# Patient Record
Sex: MALE | Race: WHITE | Employment: FULL TIME | URBAN - METROPOLITAN AREA
[De-identification: names, ages, dates, MRNs, and addresses within clinical notes are randomized per-mention and may not be internally consistent; named-entity substitution may affect disease eponyms.]

---

## 2017-01-16 ENCOUNTER — OFFICE VISIT (OUTPATIENT)
Dept: FAMILY MEDICINE | Facility: CLINIC | Age: 52
End: 2017-01-16
Payer: COMMERCIAL

## 2017-01-16 VITALS
TEMPERATURE: 98.9 F | BODY MASS INDEX: 25.9 KG/M2 | DIASTOLIC BLOOD PRESSURE: 65 MMHG | WEIGHT: 185 LBS | HEIGHT: 71 IN | HEART RATE: 81 BPM | SYSTOLIC BLOOD PRESSURE: 134 MMHG

## 2017-01-16 DIAGNOSIS — E89.0 POSTOPERATIVE HYPOTHYROIDISM: Primary | ICD-10-CM

## 2017-01-16 DIAGNOSIS — Z85.850 HISTORY OF PAPILLARY ADENOCARCINOMA OF THYROID: ICD-10-CM

## 2017-01-16 PROCEDURE — 99213 OFFICE O/P EST LOW 20 MIN: CPT | Performed by: NURSE PRACTITIONER

## 2017-01-16 NOTE — PROGRESS NOTES
"  SUBJECTIVE:                                                    Marcelo Hawkins is a 51 year old male who presents to clinic today for the following health issues:      Hypothyroidism Follow-up      Since last visit, patient describes the following symptoms: Weight stable, no hair loss, no skin changes, no constipation, no loose stools       Amount of exercise or physical activity: 6-7 days/week for an average of 45-60 minutes    Problems taking medications regularly: No    Medication side effects: none    Diet: regular (no restrictions)    History of papillary thyroid cancer- post thyroidectomy 1989.   Goal TSH is 0.7-1.4.    -------------------------------------    Problem list and histories reviewed & adjusted, as indicated.  Additional history: as documented    Problem list, Medication list, Allergies, and Medical/Social/Surgical histories reviewed in [facility] and updated as appropriate.    ROS:  Constitutional, HEENT, cardiovascular, pulmonary, GI, , musculoskeletal, neuro, skin, endocrine and psych systems are negative, except as otherwise noted.    OBJECTIVE:                                                    /65 mmHg  Pulse 81  Temp(Src) 98.9  F (37.2  C) (Tympanic)  Ht 5' 11\" (1.803 m)  Wt 185 lb (83.915 kg)  BMI 25.81 kg/m2  Body mass index is 25.81 kg/(m^2).  GENERAL: healthy, alert and no distress  NECK: no adenopathy, no asymmetry, masses, or scars and thyroid normal to palpation  RESP: lungs clear to auscultation - no rales, rhonchi or wheezes  CV: regular rate and rhythm, normal S1 S2, no S3 or S4, no murmur, click or rub, no peripheral edema and peripheral pulses strong  MS: no gross musculoskeletal defects noted, no edema    Diagnostic Test Results:  none      ASSESSMENT/PLAN:                                                      1. Postoperative hypothyroidism  GOAL TSH 0.7-1.4  Currently taking Synthroid 150 mcg- no symptoms   - TSH with free T4 reflex; Future- patient prefers to recheck TSH " In next 1-2 months since previous reading 4 months ago was high    2. History of papillary adenocarcinoma of thyroid    - TSH with free T4 reflex; Future    Follow up 8/2017 for yearly refill of medications, lipid panel    MATTHEW Landry [facility]

## 2017-01-16 NOTE — NURSING NOTE
"Initial /65 mmHg  Pulse 81  Temp(Src) 98.9  F (37.2  C) (Tympanic)  Ht 5' 11\" (1.803 m)  Wt 185 lb (83.915 kg)  BMI 25.81 kg/m2 Estimated body mass index is 25.81 kg/(m^2) as calculated from the following:    Height as of this encounter: 5' 11\" (1.803 m).    Weight as of this encounter: 185 lb (83.915 kg). .    Jeanie Nunez    "

## 2017-01-16 NOTE — MR AVS SNAPSHOT
After Visit Summary   1/16/2017    Marcelo Hawkins    MRN: [id number]           Patient Information     Date Of Birth          1965        Visit Information        Provider Department      1/16/2017 8:40 AM Maya Lema APRN [facility]        Today's Diagnoses     Postoperative hypothyroidism    -  1     History of papillary adenocarcinoma of thyroid           Care Instructions    1. Labs in February/ March time to check TSH level            Thank you for choosing [facility].  You may be receiving a survey in the mail from [employer] regarding your visit today.  Please take a few minutes to complete and return the survey to let us know how we are doing.      If you have questions or concerns, please contact us via [employer] or you can contact your care team at [phone].    Our Clinic hours are:  Monday 6:40 am  to 7:00 pm  Tuesday -Friday 6:40 am to 5:00 pm    The [place] outpatient lab hours are:  Monday - Friday 6:10 am to 4:45 pm  Saturdays 7:00 am to 11:00 am  Appointments are required, call [phone]    If you have clinical questions after hours or would like to schedule an appointment,  call the clinic at [phone].        Follow-ups after your visit        Future tests that were ordered for you today     Open Future Orders        Priority Expected Expires Ordered    TSH with free T4 reflex Routine 2/15/2017 1/16/2018 1/16/2017            Who to contact     If you have questions or need follow up information about today's clinic visit or your schedule please contact [facility] directly at [phone].  Normal or non-critical lab and imaging results will be communicated to you by MyChart, letter or phone within 4 business days after the clinic has received the results. If you do not hear from us within 7 days, please contact the clinic through [employer] or phone. If you have a critical or abnormal lab result, we will notify you by phone as soon  "as possible.  Submit refill requests through [employer] or call your pharmacy and they will forward the refill request to us. Please allow 3 business days for your refill to be completed.          Additional Information About Your Visit        [employer] Information     [employer] gives you secure access to your electronic health record. If you see a primary care provider, you can also send messages to your care team and make appointments. If you have questions, please call your primary care clinic.  If you do not have a primary care provider, please call [phone] and they will assist you.        Care EveryWhere ID     This is your Care EveryWhere ID. This could be used by other organizations to access your [place] medical records  [id number]        Your Vitals Were     Pulse Temperature Height BMI (Body Mass Index)          81 98.9  F (37.2  C) (Tympanic) 5' 11\" (1.803 m) 25.81 kg/m2         Blood Pressure from Last 3 Encounters:   01/16/17 134/65   12/27/16 130/76   09/15/16 118/77    Weight from Last 3 Encounters:   01/16/17 185 lb (83.915 kg)   08/30/16 181 lb 6 oz (82.271 kg)   03/17/16 172 lb (78.019 kg)               Primary Care Provider Office Phone # Fax #    Maya MATTHEW Glass [facility] [phone]       [facility] [address] [address]        Thank you!     Thank you for choosing [facility]  for your care. Our goal is always to provide you with excellent care. Hearing back from our patients is one way we can continue to improve our services. Please take a few minutes to complete the written survey that you may receive in the mail after your visit with us. Thank you!             Your Updated Medication List - Protect others around you: Learn how to safely use, store and throw away your medicines at www.disposemymeds.org.          This list is accurate as of: 1/16/17  8:55 AM.  Always use your most recent med list.                   Brand Name Dispense " Instructions for use    calcium 600 + D 600-400 MG-UNIT per tablet   Generic drug:  calcium-vitamin D     100 tablet    Take 1 tablet by mouth 2 times daily.       levothyroxine 150 MCG tablet    SYNTHROID/LEVOTHROID    45 tablet    Synthroid, brand name only, DO NOT SUBSTITUTE.  Take 1 tablet every other day.  Takes 137 mcg on alternate day.       loratadine 10 MG tablet    CLARITIN    90 tablet    Take 1 tablet (10 mg) by mouth daily       MULTIVITAMIN PO          order for DME     2 Device    Superfeet inserts       simvastatin 20 MG tablet    ZOCOR    90 tablet    Take 1 tablet (20 mg) by mouth At Bedtime

## 2017-01-16 NOTE — PATIENT INSTRUCTIONS
1. Labs in February/ March time to check TSH level            Thank you for choosing [facility].  You may be receiving a survey in the mail from [employer] regarding your visit today.  Please take a few minutes to complete and return the survey to let us know how we are doing.      If you have questions or concerns, please contact us via [employer] or you can contact your care team at [phone].    Our Clinic hours are:  Monday 6:40 am  to 7:00 pm  Tuesday -Friday 6:40 am to 5:00 pm    The [place] outpatient lab hours are:  Monday - Friday 6:10 am to 4:45 pm  Saturdays 7:00 am to 11:00 am  Appointments are required, call [phone]    If you have clinical questions after hours or would like to schedule an appointment,  call the clinic at [phone].

## 2017-03-03 DIAGNOSIS — E89.0 POSTOPERATIVE HYPOTHYROIDISM: ICD-10-CM

## 2017-03-03 DIAGNOSIS — Z85.850 HISTORY OF PAPILLARY ADENOCARCINOMA OF THYROID: ICD-10-CM

## 2017-03-03 LAB — TSH SERPL DL<=0.005 MIU/L-ACNC: 2.64 MU/L (ref 0.4–4)

## 2017-03-03 PROCEDURE — 36415 COLL VENOUS BLD VENIPUNCTURE: CPT | Performed by: NURSE PRACTITIONER

## 2017-03-03 PROCEDURE — 84443 ASSAY THYROID STIM HORMONE: CPT | Performed by: NURSE PRACTITIONER

## 2017-03-13 ENCOUNTER — MYC MEDICAL ADVICE (OUTPATIENT)
Dept: FAMILY MEDICINE | Facility: CLINIC | Age: 52
End: 2017-03-13

## 2017-03-13 DIAGNOSIS — E03.9 HYPOTHYROIDISM, UNSPECIFIED TYPE: ICD-10-CM

## 2017-03-13 RX ORDER — LEVOTHYROXINE SODIUM 150 UG/1
TABLET ORAL
Qty: 45 TABLET | Refills: 0 | Status: SHIPPED | OUTPATIENT
Start: 2017-03-13 | End: 2017-03-13

## 2017-03-13 RX ORDER — LEVOTHYROXINE SODIUM 150 UG/1
TABLET ORAL
Qty: 45 TABLET | Refills: 3 | Status: SHIPPED | OUTPATIENT
Start: 2017-03-13 | End: 2017-03-14

## 2017-03-13 NOTE — TELEPHONE ENCOUNTER
Maya Lema,   Please see my chart below, do you want to schedule another TSH? Levothyroxine refilled. Please advise.   Gregory

## 2017-03-14 RX ORDER — LEVOTHYROXINE SODIUM 150 UG/1
TABLET ORAL
Qty: 90 TABLET | Refills: 3 | Status: SHIPPED | OUTPATIENT
Start: 2017-03-14 | End: 2017-03-20

## 2017-03-14 NOTE — TELEPHONE ENCOUNTER
Advised him via [employer].   I did ask a question though, as I don't see the 137 on his chart any longer, and the 150 was still sent as every other day.     Will wait for his response. He may need 137 mcg tablets also.   Myesha Tinoco RNC

## 2017-03-14 NOTE — TELEPHONE ENCOUNTER
Yes-ok to do 150 mcg daily- please update chart to reflect this and get rid of old synthroid 137 mcg prescription.

## 2017-03-14 NOTE — TELEPHONE ENCOUNTER
Carmen- please see his mychart note, he is on synthroid, brand name only, 150 mcg DAILY now.   The last Rx (sent in per Dr Nichols) still said every other day.   Ok to send in the synthroid 150 daily for a year?   Lab Results   Component Value Date    TSH 2.64 03/03/2017    TSH 2.34 12/23/2016       Myesha Tinoco RNC

## 2017-03-20 DIAGNOSIS — E03.9 HYPOTHYROIDISM, UNSPECIFIED TYPE: ICD-10-CM

## 2017-03-20 RX ORDER — LEVOTHYROXINE SODIUM 150 UG/1
TABLET ORAL
Qty: 90 TABLET | Refills: 3 | Status: SHIPPED | OUTPATIENT
Start: 2017-03-20 | End: 2017-05-09 | Stop reason: DRUGHIGH

## 2017-03-20 NOTE — TELEPHONE ENCOUNTER
Pharmacy calling to clarify sig on prescription sent on 3/14/17.  Note copied below from 3/13/17 [employer] message.    Maya Lema, MATTHEW CNP        10:58 AM   Note      Yes-ok to do 150 mcg daily- please update chart to reflect this and get rid of old synthroid 137 mcg prescription.            New prescription sent to pharmacy.    Deana Mccormack, RN

## 2017-05-08 ENCOUNTER — OFFICE VISIT (OUTPATIENT)
Dept: FAMILY MEDICINE | Facility: CLINIC | Age: 52
End: 2017-05-08
Payer: COMMERCIAL

## 2017-05-08 VITALS
DIASTOLIC BLOOD PRESSURE: 75 MMHG | TEMPERATURE: 97 F | RESPIRATION RATE: 16 BRPM | HEART RATE: 45 BPM | BODY MASS INDEX: 24.69 KG/M2 | WEIGHT: 182.25 LBS | HEIGHT: 72 IN | SYSTOLIC BLOOD PRESSURE: 125 MMHG

## 2017-05-08 DIAGNOSIS — E03.9 ACQUIRED HYPOTHYROIDISM: ICD-10-CM

## 2017-05-08 DIAGNOSIS — C73 PAPILLARY CARCINOMA OF THYROID (H): ICD-10-CM

## 2017-05-08 DIAGNOSIS — K21.9 GASTROESOPHAGEAL REFLUX DISEASE WITHOUT ESOPHAGITIS: ICD-10-CM

## 2017-05-08 DIAGNOSIS — Z00.8 ENCOUNTER FOR BIOMETRIC SCREENING: ICD-10-CM

## 2017-05-08 DIAGNOSIS — Z00.00 ROUTINE GENERAL MEDICAL EXAMINATION AT A HEALTH CARE FACILITY: ICD-10-CM

## 2017-05-08 DIAGNOSIS — Z13.6 CARDIOVASCULAR SCREENING; LDL GOAL LESS THAN 130: Primary | ICD-10-CM

## 2017-05-08 DIAGNOSIS — Z13.1 ENCOUNTER FOR SCREENING EXAMINATION FOR IMPAIRED GLUCOSE REGULATION AND DIABETES MELLITUS: ICD-10-CM

## 2017-05-08 LAB — TSH SERPL DL<=0.005 MIU/L-ACNC: 3.23 MU/L (ref 0.4–4)

## 2017-05-08 PROCEDURE — 36415 COLL VENOUS BLD VENIPUNCTURE: CPT | Performed by: NURSE PRACTITIONER

## 2017-05-08 PROCEDURE — 99213 OFFICE O/P EST LOW 20 MIN: CPT | Mod: 25 | Performed by: NURSE PRACTITIONER

## 2017-05-08 PROCEDURE — 84443 ASSAY THYROID STIM HORMONE: CPT | Performed by: NURSE PRACTITIONER

## 2017-05-08 PROCEDURE — 99396 PREV VISIT EST AGE 40-64: CPT | Performed by: NURSE PRACTITIONER

## 2017-05-08 NOTE — MR AVS SNAPSHOT
After Visit Summary   5/8/2017    Marcelo Hawkins    MRN: [id number]           Patient Information     Date Of Birth          1965        Visit Information        Provider Department      5/8/2017 6:40 AM Maya Lema APRN [facility]        Today's Diagnoses     CARDIOVASCULAR SCREENING; LDL GOAL LESS THAN 130    -  1    Encounter for screening examination for impaired glucose regulation and diabetes mellitus        Papillary carcinoma of thyroid (H)        Acquired hypothyroidism        Gastroesophageal reflux disease without esophagitis          Care Instructions    1. Labs today  2. Schedule upper GI to look at your stomach      Preventive Health Recommendations  Male Ages 50 - 64    Yearly exam:             See your health care provider every year in order to  o   Review health changes.   o   Discuss preventive care.    o   Review your medicines if your doctor has prescribed any.     Have a cholesterol test every 5 years, or more frequently if you are at risk for high cholesterol/heart disease.     Have a diabetes test (fasting glucose) every three years. If you are at risk for diabetes, you should have this test more often.     Have a colonoscopy at age 50, or have a yearly FIT test (stool test). These exams will check for colon cancer.      Talk with your health care provider about whether or not a prostate cancer screening test (PSA) is right for you.    You should be tested each year for STDs (sexually transmitted diseases), if you re at risk.     Shots: Get a flu shot each year. Get a tetanus shot every 10 years.     Nutrition:    Eat at least 5 servings of fruits and vegetables daily.     Eat whole-grain bread, whole-wheat pasta and brown rice instead of white grains and rice.     Talk to your provider about Calcium and Vitamin D.     Lifestyle    Exercise for at least 150 minutes a week (30 minutes a day, 5 days a week). This will help you control your weight and  prevent disease.     Limit alcohol to one drink per day.     No smoking.     Wear sunscreen to prevent skin cancer.     See your dentist every six months for an exam and cleaning.     See your eye doctor every 1 to 2 years.    Lifestyle Changes for Controlling GERD  When you have GERD, stomach acid feels as if it s backing up toward your mouth. Whether or not you take medication to control your GERD, your symptoms can often be improved with lifestyle changes. Talk to your doctor about the following suggestions, which may help you get relief from your symptoms.  Raise Your Head    Reflux is more likely to strike when you re lying down flat, because stomach fluid can flow backward more easily. Raising the head of your bed 4 to 6 inches can help. To do this:    Slide blocks or books under the legs at the head of your bed. Or, place a wedge under the mattress. Many [employer] can make a suitable wedge for you. The wedge should run from your waist to the top of your head.    Don t just prop your head on several pillows. This increases pressure on your stomach. It can make GERD worse.  Watch Your Eating Habits  Certain foods may increase the acid in your stomach or relax the lower esophageal sphincter, making GERD more likely. It s best to avoid the following:    Coffee, tea, and carbonated drinks (with and without caffeine)    Fatty, fried, or spicy food    Mint, chocolate, onions, and tomatoes    Any other foods that seem to irritate your stomach or cause you pain  Relieve the Pressure    Eat smaller meals, even if you have to eat more often.    Don t lie down right after you eat. Wait a few hours for your stomach to empty.    Avoid tight belts and tight-fitting clothes.    Lose excess weight.  Tobacco and Alcohol  Avoid smoking tobacco and drinking alcohol. They can make GERD symptoms worse.    0871-0630 [employer]. [address] [place] [address], [place] [address]. All rights reserved. This information is not  intended as a substitute for professional medical care. Always follow your healthcare professional's instructions.        GERD (Adult)    The esophagus is a tube that carries food from the mouth to the stomach. A valve at the lower end of the esophagus prevents stomach acid from flowing upward. When this valve doesn't work properly, stomach contents may repeatedly flow back up (reflux) into the esophagus. This is called gastroesophageal reflux disease (GERD). GERD can irritate the esophagus. It can cause problems with swallowing or breathing. In severe cases, GERD can cause recurrent pneumonia or other serious problems.  Symptoms of reflux include burning, pressure or sharp pain in the upper abdomen or mid to lower chest. The pain can spread to the neck, back, or shoulder. There may be belching, an acid taste in the back of the throat, chronic cough, or sore throat or hoarseness. GERD symptoms often occur during the day after a big meal. They can also occur at night when lying down.   Home care  Lifestyle changes can help reduce symptoms. If needed, medicines may be prescribed. Symptoms often improve with treatment, but if treatment is stopped, the symptoms often return after a few months. So most persons with GERD will need to continue treatment.  Lifestyle changes    Limit or avoid fatty, fried, and spicy foods, as well as coffee, chocolate, mint, and foods with high acid content such as tomatoes and citrus fruit and juices (orange, grapefruit, lemon).    Don t eat large meals, especially at night. Frequent, smaller meals are best. Do not lie down right after eating. And don t eat anything 3 hours before going to bed.    Avoid drinking alcohol and smoking. As much as possible, stay away from second hand smoke.    If you are overweight, losing weight will reduce symptoms.     Avoid wearing tight clothing around your stomach area.    If your symptoms occur during sleep, use a foam wedge to elevate your upper body  "(not just your head.) Or, place 4\" blocks under the head of your bed.  Medicines  If needed, medicines can help relieve the symptoms of GERD and prevent damage to the esophagus. Discuss a medicine plan with your healthcare provider. This may include one or more of the following medicines:    Antacids to help neutralize the normal acids in your stomach.    Acid blockers (H2 blockers) to decrease acid production.    Acid inhibitors (PPIs) to decrease acid production in a different way than the blockers. They may work better, but can take a little longer to take effect.  Take an antacid 30-60 minutes after eating and at bedtime, but not at the same time as an acid blocker.  Try not to take medicines such as ibuprofen and aspirin. If you are taking aspirin for your heart or other medical reasons, talk to your healthcare provider about stopping it.  Follow-up care  Follow up with your healthcare provider or as advised by our staff.  When to seek medical advice  Call your healthcare provider if any of the following occur:    Stomach pain gets worse or moves to the lower right abdomen (appendix area)    Chest pain appears or gets worse, or spreads to the back, neck, shoulder, or arm    Frequent vomiting (can t keep down liquids)    Blood in the stool or vomit (red or black in color)    Feeling weak or dizzy    Fever of 100.4 F (38 C) or higher, or as directed by your healthcare provider    2764-5165 [employer]. [address] [place], [place] [address]. All rights reserved. This information is not intended as a substitute for professional medical care. Always follow your healthcare professional's instructions.              Follow-ups after your visit        Additional Services     GASTROENTEROLOGY ADULT REF PROCEDURE ONLY       Last Lab Result: Creatinine (mg/dL)       Date                     Value                 05/27/2015               0.89             ----------  Body mass index is 24.72 " kg/(m^2).     Needed:  No  Language:  English    Patient will be contacted to schedule procedure.     Please be aware that coverage of these services is subject to the terms and limitations of your health insurance plan.  Call member services at your health plan with any benefit or coverage questions.  Any procedures must be performed at a [place] facility OR coordinated by your clinic's referral office.    Please bring the following with you to your appointment:    (1) Any X-Rays, CTs or MRIs which have been performed.  Contact the facility where they were done to arrange for  prior to your scheduled appointment.    (2) List of current medications   (3) This referral request   (4) Any documents/labs given to you for this referral                  Who to contact     If you have questions or need follow up information about today's clinic visit or your schedule please contact [facility] directly at [phone].  Normal or non-critical lab and imaging results will be communicated to you by MyChart, letter or phone within 4 business days after the clinic has received the results. If you do not hear from us within 7 days, please contact the clinic through [employer] or phone. If you have a critical or abnormal lab result, we will notify you by phone as soon as possible.  Submit refill requests through [employer] or call your pharmacy and they will forward the refill request to us. Please allow 3 business days for your refill to be completed.          Additional Information About Your Visit        [employer] Information     [employer] gives you secure access to your electronic health record. If you see a primary care provider, you can also send messages to your care team and make appointments. If you have questions, please call your primary care clinic.  If you do not have a primary care provider, please call [phone] and they will assist you.        Care EveryWhere ID     This is your Care  EveryWhere ID. This could be used by other organizations to access your [place] medical records  [id number]        Your Vitals Were     Pulse Temperature Respirations Height BMI (Body Mass Index)       45 97  F (36.1  C) (Tympanic) 16 6' (1.829 m) 24.72 kg/m2        Blood Pressure from Last 3 Encounters:   05/08/17 125/75   01/16/17 134/65   12/27/16 130/76    Weight from Last 3 Encounters:   05/08/17 182 lb 4 oz (82.7 kg)   01/16/17 185 lb (83.9 kg)   08/30/16 181 lb 6 oz (82.3 kg)              We Performed the Following     GASTROENTEROLOGY ADULT REF PROCEDURE ONLY     Glucose     Lipid Profile with reflex to direct LDL     TSH with free T4 reflex        Primary Care Provider Office Phone # Fax #    Maya Aiken Pita MATTHEW ERINN [phone]       [address] [address]        Thank you!     Thank you for choosing [facility]  for your care. Our goal is always to provide you with excellent care. Hearing back from our patients is one way we can continue to improve our services. Please take a few minutes to complete the written survey that you may receive in the mail after your visit with us. Thank you!             Your Updated Medication List - Protect others around you: Learn how to safely use, store and throw away your medicines at www.disposemymeds.org.          This list is accurate as of: 5/8/17  7:19 AM.  Always use your most recent med list.                   Brand Name Dispense Instructions for use    calcium 600 + D 600-400 MG-UNIT per tablet   Generic drug:  calcium-vitamin D     100 tablet    Take 1 tablet by mouth 2 times daily.       levothyroxine 150 MCG tablet    SYNTHROID/LEVOTHROID    90 tablet    Synthroid, brand name only, DO NOT SUBSTITUTE.  Take 1 tablet daily       loratadine 10 MG tablet    CLARITIN    90 tablet    Take 1 tablet (10 mg) by mouth daily       MULTIVITAMIN PO          order for DME     2 Device    Superfeet inserts        simvastatin 20 MG tablet    ZOCOR    90 tablet    Take 1 tablet (20 mg) by mouth At Bedtime

## 2017-05-08 NOTE — PROGRESS NOTES
SUBJECTIVE:     CC: Marcelo Hawkins is an 52 year old male who presents for preventative health visit.     The 10-year ASCVD risk score ([place] ARIEL Jr, et al., 2013) is: 3.4%    Values used to calculate the score:      Age: 52 years      Sex: Male      Is Non- : No      Diabetic: No      Tobacco smoker: No      Systolic Blood Pressure: 125 mmHg      Is BP treated: No      HDL Cholesterol: 55 mg/dL      Total Cholesterol: 186 mg/dL  Patient is eligible for use of low-dose aspirin for primary prevention of heart attack and stroke.  Provider has discussed aspirin with patient and our decision was:     Not Indicated:  Daily low-dose aspirin recommended for primary prevention, patient not eligible.      Healthy Habits:    Do you get at least three servings of calcium containing foods daily (dairy, green leafy vegetables, etc.)? yes    Amount of exercise or daily activities, outside of work: 7 day(s) per week for 25 minutes at gym.    Problems taking medications regularly No    Medication side effects: No    Have you had an eye exam in the past two years? yes    Do you see a dentist twice per year? yes    Do you have sleep apnea, excessive snoring or daytime drowsiness?no        PROBLEMS TO ADD ON...    GERD: has reflux symptoms - tries to watch diet, not eating late at night, no caffeine or chocolate in diet. Avoid alcohol, not smoking.   Avoid eating spicy or acidic foods.   Occasionally takes Zantac.   Feels like he has a hoarse voice at times    History of thyroid papillary cancer- had thyroidectomy  On Levothyroxine. Would like his levels checked today.    Patient has biometric paperwork to be filled out.      Today's PHQ-2 Score:   PHQ-2 ( 1999 [employer]) 8/29/2016 5/21/2015   Q1: Little interest or pleasure in doing things - 2   Q2: Feeling down, depressed or hopeless - 2   PHQ-2 Score - 4   Little interest or pleasure in doing things Not at all -   Feeling down, depressed or hopeless Not at all -    PHQ-2 Score 0 -       Abuse: Current or Past(Physical, Sexual or Emotional)- No  Do you feel safe in your environment - Yes    Social History   Substance Use Topics     Smoking status: Never Smoker     Smokeless tobacco: Never Used     Alcohol use Yes      Comment: 2-3 drinks a week     not really    Last PSA:   PSA   Date Value Ref Range Status   12/17/2013 1.50 0 - 4 ug/L Final       Recent Labs   Lab Test  09/09/16   0608  05/27/15   0623  08/01/14   0702   CHOL  186  148  192   HDL  55  58  58   LDL  106*  70  115   TRIG  125  99  97   CHOLHDLRATIO   --   2.6  3.3   NHDL  131*   --    --        Reviewed orders with patient. Reviewed health maintenance and updated orders accordingly - Yes    Reviewed and updated as needed this visit by clinical staff         Reviewed and updated as needed this visit by Provider            ROS:  C: NEGATIVE for fever, chills, change in weight  I: NEGATIVE for worrisome rashes, moles or lesions  E: NEGATIVE for vision changes or irritation  ENT: NEGATIVE for ear, mouth and throat problems  R: NEGATIVE for significant cough or SOB  CV: NEGATIVE for chest pain, palpitations or peripheral edema  GI: POSITIVE for Hx GERD   male: negative for dysuria, hematuria, decreased urinary stream, erectile dysfunction, urethral discharge  M: NEGATIVE for significant arthralgias or myalgia  N: NEGATIVE for weakness, dizziness or paresthesias  P: NEGATIVE for changes in mood or affect    Problem list, Medication list, Allergies, and Medical/Social/Surgical histories reviewed in EPIC and updated as appropriate.  OBJECTIVE:     There were no vitals taken for this visit.  EXAM:  GENERAL: healthy, alert and no distress  EYES: Eyes grossly normal to inspection, PERRL and conjunctivae and sclerae normal  HENT: ear canals and TM's normal, nose and mouth without ulcers or lesions  NECK: no adenopathy, no asymmetry, masses, or scars and thyroid normal to palpation  RESP: lungs clear to auscultation - no  "rales, rhonchi or wheezes  CV: regular rate and rhythm, normal S1 S2, no S3 or S4, no murmur, click or rub, no peripheral edema and peripheral pulses strong  ABDOMEN: soft, nontender, no hepatosplenomegaly, no masses and bowel sounds normal   (male): normal male genitalia without lesions or urethral discharge, no hernia  RECTAL: normal sphincter tone, no rectal masses, prostate normal size, smooth, nontender without nodules or masses  MS: no gross musculoskeletal defects noted, no edema  SKIN: no suspicious lesions or rashes  NEURO: Normal strength and tone, mentation intact and speech normal  PSYCH: mentation appears normal, affect normal/bright    ASSESSMENT/PLAN:     1. Routine general medical examination at a health care facility      2. Papillary carcinoma of thyroid (H)  Post thyroidectomy     3. Acquired hypothyroidism  - continue current dose of Levothyroxine   - TSH with free T4 reflex    4. Gastroesophageal reflux disease without esophagitis  Discussed starting Omeprazole to see if controls symptoms - however patient declined at this time and would like Endoscopy   - GASTROENTEROLOGY ADULT REF PROCEDURE ONLY  - handout given on GERD precautions.     5. CARDIOVASCULAR SCREENING; LDL GOAL LESS THAN 130    - Lipid Profile (Chol, Trig, HDL, LDL calc); Future  - Glucose; Future    6. Encounter for screening examination for impaired glucose regulation and diabetes mellitus  - Glucose; Future    7. Biometric screen  - will fill out paper work once has lab work done.     COUNSELING:  Reviewed preventive health counseling, as reflected in patient instructions       Regular exercise       Healthy diet/nutrition       Prostate cancer screening         reports that he has never smoked. He has never used smokeless tobacco.    Estimated body mass index is 25.8 kg/(m^2) as calculated from the following:    Height as of 1/16/17: 5' 11\" (1.803 m).    Weight as of 1/16/17: 185 lb (83.9 kg).       Counseling " Resources:  ATP IV Guidelines  Pooled Cohorts Equation Calculator  FRAX Risk Assessment  ICSI Preventive Guidelines  Dietary Guidelines for Americans, 2010  USDA's MyPlate  ASA Prophylaxis  Lung CA Screening    MATTHEW Landry CNP  [facility]

## 2017-05-08 NOTE — PATIENT INSTRUCTIONS
1. Labs today  2. Schedule upper GI to look at your stomach      Preventive Health Recommendations  Male Ages 50 - 64    Yearly exam:             See your health care provider every year in order to  o   Review health changes.   o   Discuss preventive care.    o   Review your medicines if your doctor has prescribed any.     Have a cholesterol test every 5 years, or more frequently if you are at risk for high cholesterol/heart disease.     Have a diabetes test (fasting glucose) every three years. If you are at risk for diabetes, you should have this test more often.     Have a colonoscopy at age 50, or have a yearly FIT test (stool test). These exams will check for colon cancer.      Talk with your health care provider about whether or not a prostate cancer screening test (PSA) is right for you.    You should be tested each year for STDs (sexually transmitted diseases), if you re at risk.     Shots: Get a flu shot each year. Get a tetanus shot every 10 years.     Nutrition:    Eat at least 5 servings of fruits and vegetables daily.     Eat whole-grain bread, whole-wheat pasta and brown rice instead of white grains and rice.     Talk to your provider about Calcium and Vitamin D.     Lifestyle    Exercise for at least 150 minutes a week (30 minutes a day, 5 days a week). This will help you control your weight and prevent disease.     Limit alcohol to one drink per day.     No smoking.     Wear sunscreen to prevent skin cancer.     See your dentist every six months for an exam and cleaning.     See your eye doctor every 1 to 2 years.    Lifestyle Changes for Controlling GERD  When you have GERD, stomach acid feels as if it s backing up toward your mouth. Whether or not you take medication to control your GERD, your symptoms can often be improved with lifestyle changes. Talk to your doctor about the following suggestions, which may help you get relief from your symptoms.  Raise Your Head    Reflux is more likely to  strike when you re lying down flat, because stomach fluid can flow backward more easily. Raising the head of your bed 4 to 6 inches can help. To do this:    Slide blocks or books under the legs at the head of your bed. Or, place a wedge under the mattress. Many foam stores can make a suitable wedge for you. The wedge should run from your waist to the top of your head.    Don t just prop your head on several pillows. This increases pressure on your stomach. It can make GERD worse.  Watch Your Eating Habits  Certain foods may increase the acid in your stomach or relax the lower esophageal sphincter, making GERD more likely. It s best to avoid the following:    Coffee, tea, and carbonated drinks (with and without caffeine)    Fatty, fried, or spicy food    Mint, chocolate, onions, and tomatoes    Any other foods that seem to irritate your stomach or cause you pain  Relieve the Pressure    Eat smaller meals, even if you have to eat more often.    Don t lie down right after you eat. Wait a few hours for your stomach to empty.    Avoid tight belts and tight-fitting clothes.    Lose excess weight.  Tobacco and Alcohol  Avoid smoking tobacco and drinking alcohol. They can make GERD symptoms worse.    6540-6280 [employer]. [address] [place] [address], [place] [address]. All rights reserved. This information is not intended as a substitute for professional medical care. Always follow your healthcare professional's instructions.        GERD (Adult)    The esophagus is a tube that carries food from the mouth to the stomach. A valve at the lower end of the esophagus prevents stomach acid from flowing upward. When this valve doesn't work properly, stomach contents may repeatedly flow back up (reflux) into the esophagus. This is called gastroesophageal reflux disease (GERD). GERD can irritate the esophagus. It can cause problems with swallowing or breathing. In severe cases, GERD can cause recurrent pneumonia or other  "serious problems.  Symptoms of reflux include burning, pressure or sharp pain in the upper abdomen or mid to lower chest. The pain can spread to the neck, back, or shoulder. There may be belching, an acid taste in the back of the throat, chronic cough, or sore throat or hoarseness. GERD symptoms often occur during the day after a big meal. They can also occur at night when lying down.   Home care  Lifestyle changes can help reduce symptoms. If needed, medicines may be prescribed. Symptoms often improve with treatment, but if treatment is stopped, the symptoms often return after a few months. So most persons with GERD will need to continue treatment.  Lifestyle changes    Limit or avoid fatty, fried, and spicy foods, as well as coffee, chocolate, mint, and foods with high acid content such as tomatoes and citrus fruit and juices (orange, grapefruit, lemon).    Don t eat large meals, especially at night. Frequent, smaller meals are best. Do not lie down right after eating. And don t eat anything 3 hours before going to bed.    Avoid drinking alcohol and smoking. As much as possible, stay away from second hand smoke.    If you are overweight, losing weight will reduce symptoms.     Avoid wearing tight clothing around your stomach area.    If your symptoms occur during sleep, use a foam wedge to elevate your upper body (not just your head.) Or, place 4\" blocks under the head of your bed.  Medicines  If needed, medicines can help relieve the symptoms of GERD and prevent damage to the esophagus. Discuss a medicine plan with your healthcare provider. This may include one or more of the following medicines:    Antacids to help neutralize the normal acids in your stomach.    Acid blockers (H2 blockers) to decrease acid production.    Acid inhibitors (PPIs) to decrease acid production in a different way than the blockers. They may work better, but can take a little longer to take effect.  Take an antacid 30-60 minutes after " eating and at bedtime, but not at the same time as an acid blocker.  Try not to take medicines such as ibuprofen and aspirin. If you are taking aspirin for your heart or other medical reasons, talk to your healthcare provider about stopping it.  Follow-up care  Follow up with your healthcare provider or as advised by our staff.  When to seek medical advice  Call your healthcare provider if any of the following occur:    Stomach pain gets worse or moves to the lower right abdomen (appendix area)    Chest pain appears or gets worse, or spreads to the back, neck, shoulder, or arm    Frequent vomiting (can t keep down liquids)    Blood in the stool or vomit (red or black in color)    Feeling weak or dizzy    Fever of 100.4 F (38 C) or higher, or as directed by your healthcare provider    9547-3300 [employer]. [address] [place] [address], [place] [address]. All rights reserved. This information is not intended as a substitute for professional medical care. Always follow your healthcare professional's instructions.

## 2017-05-09 DIAGNOSIS — E89.0 POSTOPERATIVE HYPOTHYROIDISM: Primary | ICD-10-CM

## 2017-05-09 RX ORDER — LEVOTHYROXINE SODIUM 175 UG/1
175 TABLET ORAL DAILY
Qty: 90 TABLET | Refills: 1 | Status: SHIPPED | OUTPATIENT
Start: 2017-05-09 | End: 2017-09-10

## 2017-05-12 DIAGNOSIS — Z13.6 CARDIOVASCULAR SCREENING; LDL GOAL LESS THAN 130: ICD-10-CM

## 2017-05-12 LAB
CHOLEST SERPL-MCNC: 199 MG/DL
GLUCOSE SERPL-MCNC: 87 MG/DL (ref 70–99)
HDLC SERPL-MCNC: 58 MG/DL
LDLC SERPL CALC-MCNC: 113 MG/DL
NONHDLC SERPL-MCNC: 141 MG/DL
TRIGL SERPL-MCNC: 140 MG/DL

## 2017-05-12 PROCEDURE — 82947 ASSAY GLUCOSE BLOOD QUANT: CPT | Performed by: NURSE PRACTITIONER

## 2017-05-12 PROCEDURE — 36415 COLL VENOUS BLD VENIPUNCTURE: CPT | Performed by: NURSE PRACTITIONER

## 2017-05-12 PROCEDURE — 80061 LIPID PANEL: CPT | Performed by: NURSE PRACTITIONER

## 2017-05-15 ENCOUNTER — ANESTHESIA EVENT (OUTPATIENT)
Dept: GASTROENTEROLOGY | Facility: CLINIC | Age: 52
End: 2017-05-15
Payer: COMMERCIAL

## 2017-05-16 NOTE — ANESTHESIA PREPROCEDURE EVALUATION
Anesthesia Evaluation     . Pt has had prior anesthetic.     No history of anesthetic complications          ROS/MED HX    ENT/Pulmonary:  - neg pulmonary ROS     Neurologic:       Cardiovascular:  - neg cardiovascular ROS   (+) Dyslipidemia, ----. : . . . :. .       METS/Exercise Tolerance:  >4 METS   Hematologic:  - neg hematologic  ROS       Musculoskeletal:         GI/Hepatic:  - neg GI/hepatic ROS       Renal/Genitourinary:  - ROS Renal section negative       Endo:     (+) thyroid problem hypothyroidism, .      Psychiatric:     (+) psychiatric history anxiety      Infectious Disease:         Malignancy:   (+) Malignancy History of Other          Other:                     Physical Exam  Normal systems: pulmonary and dental    Airway   Mallampati: I  TM distance: >3 FB  Neck ROM: full    Dental     Cardiovascular   Rhythm and rate: regular and normal      Pulmonary    breath sounds clear to auscultation                    Anesthesia Plan      History & Physical Review  History and physical reviewed and following examination; no interval change.    ASA Status:  2 .    NPO Status:  > 6 hours    Plan for MAC          Postoperative Care      Consents  Anesthetic plan, risks, benefits and alternatives discussed with:  Patient..                          .

## 2017-05-18 ENCOUNTER — HOSPITAL ENCOUNTER (OUTPATIENT)
Facility: CLINIC | Age: 52
Discharge: HOME | End: 2017-05-18
Attending: SURGERY | Admitting: SURGERY
Payer: COMMERCIAL

## 2017-05-18 ENCOUNTER — SURGERY (OUTPATIENT)
Age: 52
End: 2017-05-18

## 2017-05-18 ENCOUNTER — ANESTHESIA (OUTPATIENT)
Dept: GASTROENTEROLOGY | Facility: CLINIC | Age: 52
End: 2017-05-18
Payer: COMMERCIAL

## 2017-05-18 VITALS
OXYGEN SATURATION: 95 % | DIASTOLIC BLOOD PRESSURE: 95 MMHG | BODY MASS INDEX: 24.65 KG/M2 | RESPIRATION RATE: 16 BRPM | SYSTOLIC BLOOD PRESSURE: 120 MMHG | TEMPERATURE: 98 F | WEIGHT: 182 LBS | HEIGHT: 72 IN

## 2017-05-18 LAB — UPPER GI ENDOSCOPY: NORMAL

## 2017-05-18 PROCEDURE — 88312 SPECIAL STAINS GROUP 1: CPT | Mod: 26 | Performed by: SURGERY

## 2017-05-18 PROCEDURE — 43239 EGD BIOPSY SINGLE/MULTIPLE: CPT | Performed by: SURGERY

## 2017-05-18 PROCEDURE — 88312 SPECIAL STAINS GROUP 1: CPT | Performed by: SURGERY

## 2017-05-18 PROCEDURE — 25000132 ZZH RX MED GY IP 250 OP 250 PS 637: Performed by: SURGERY

## 2017-05-18 PROCEDURE — 25000125 ZZHC RX 250: Performed by: NURSE ANESTHETIST, CERTIFIED REGISTERED

## 2017-05-18 PROCEDURE — 25000128 H RX IP 250 OP 636: Performed by: SURGERY

## 2017-05-18 PROCEDURE — 25000125 ZZHC RX 250: Performed by: SURGERY

## 2017-05-18 PROCEDURE — 88305 TISSUE EXAM BY PATHOLOGIST: CPT | Mod: 26 | Performed by: SURGERY

## 2017-05-18 PROCEDURE — 88305 TISSUE EXAM BY PATHOLOGIST: CPT | Performed by: SURGERY

## 2017-05-18 PROCEDURE — 37000008 ZZH ANESTHESIA TECHNICAL FEE, 1ST 30 MIN: Performed by: SURGERY

## 2017-05-18 RX ORDER — ONDANSETRON 2 MG/ML
4 INJECTION INTRAMUSCULAR; INTRAVENOUS
Status: DISCONTINUED | OUTPATIENT
Start: 2017-05-18 | End: 2017-05-18 | Stop reason: HOSPADM

## 2017-05-18 RX ORDER — SODIUM CHLORIDE, SODIUM LACTATE, POTASSIUM CHLORIDE, CALCIUM CHLORIDE 600; 310; 30; 20 MG/100ML; MG/100ML; MG/100ML; MG/100ML
INJECTION, SOLUTION INTRAVENOUS CONTINUOUS
Status: DISCONTINUED | OUTPATIENT
Start: 2017-05-18 | End: 2017-05-18 | Stop reason: HOSPADM

## 2017-05-18 RX ORDER — LIDOCAINE 40 MG/G
CREAM TOPICAL
Status: DISCONTINUED | OUTPATIENT
Start: 2017-05-18 | End: 2017-05-18 | Stop reason: HOSPADM

## 2017-05-18 RX ORDER — PROPOFOL 10 MG/ML
INJECTION, EMULSION INTRAVENOUS PRN
Status: DISCONTINUED | OUTPATIENT
Start: 2017-05-18 | End: 2017-05-18

## 2017-05-18 RX ORDER — SIMETHICONE 40MG/0.6ML
SUSPENSION, DROPS(FINAL DOSAGE FORM)(ML) ORAL PRN
Status: DISCONTINUED | OUTPATIENT
Start: 2017-05-18 | End: 2017-05-18 | Stop reason: HOSPADM

## 2017-05-18 RX ADMIN — SIMETHICONE 40 MG: 20 SUSPENSION/ DROPS ORAL at 11:07

## 2017-05-18 RX ADMIN — LIDOCAINE HYDROCHLORIDE 1 ML: 10 INJECTION, SOLUTION INFILTRATION; PERINEURAL at 10:28

## 2017-05-18 RX ADMIN — BENZOCAINE 2 SPRAY: 220 SPRAY, METERED PERIODONTAL at 11:07

## 2017-05-18 RX ADMIN — SODIUM CHLORIDE, POTASSIUM CHLORIDE, SODIUM LACTATE AND CALCIUM CHLORIDE: 600; 310; 30; 20 INJECTION, SOLUTION INTRAVENOUS at 10:28

## 2017-05-18 RX ADMIN — PROPOFOL 200 MG: 10 INJECTION, EMULSION INTRAVENOUS at 11:09

## 2017-05-18 RX ADMIN — PROPOFOL 50 MG: 10 INJECTION, EMULSION INTRAVENOUS at 11:11

## 2017-05-18 RX ADMIN — PROPOFOL 50 MG: 10 INJECTION, EMULSION INTRAVENOUS at 11:13

## 2017-05-18 NOTE — OP NOTE
EGD - irregular Z line with mild esophagitis (biopsies taken). Small hiatal hernia.  Stomach and duodenum otherwise normal.

## 2017-05-18 NOTE — ANESTHESIA POSTPROCEDURE EVALUATION
Patient: Marcelo Hawkins    Procedure(s):  Gastroscopy   - Wound Class: II-Clean Contaminated    Diagnosis:GERD  Diagnosis Additional Information: No value filed.    Anesthesia Type:  No value filed.    Note:  Anesthesia Post Evaluation    Patient location during evaluation: Bedside  Patient participation: Able to fully participate in evaluation  Level of consciousness: awake and alert  Pain management: adequate  Airway patency: patent  Cardiovascular status: acceptable  Respiratory status: acceptable  Hydration status: acceptable  PONV: none     Anesthetic complications: None          Last vitals:  Vitals:    05/18/17 0959   BP: (!) 142/101   Resp: 18   Temp: 36.7  C (98  F)   SpO2: 98%         Electronically Signed By: MATTHEW Grayson CRNA  May 18, 2017  11:22 AM

## 2017-05-18 NOTE — H&P
52 year old year old male here for upper endoscopy for GERD        Patient Active Problem List   Diagnosis     Papillary carcinoma of thyroid (H)     Hyperlipidemia LDL goal <130     Osteopenia     Internal hemorrhoids     H/O seasonal allergies     Left foot pain     Hypothyroidism     Anxiety       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, residual hypothyroidism     Other and unspecified hyperlipidemia      Postsurgical hypothyroidism     s/p thryoidectomy        Past Surgical History:   Procedure Laterality Date     COLONOSCOPY N/A 3/17/2016    Procedure: COLONOSCOPY;  Surgeon: Jovanny Fuller MD;  Location: [facility]     SURGICAL HISTORY OF -       hernia times three-right inguinal     SURGICAL HISTORY OF -   1989    thyroidectomy       Family History   Problem Relation Age of Onset     Lipids Mother      Hypertension Father      CANCER Father      sweat gland cancer     Other - See Comments Father 77     IPF - ideomatic pulmonary fibrosis     Lipids Brother      Thyroid Disease Brother      Arthritis Maternal Grandfather      HEART DISEASE Paternal Grandmother      Melanoma No family hx of        No current outpatient prescriptions on file.       No Known Allergies    Pt reports that he has never smoked. He has never used smokeless tobacco. He reports that he drinks alcohol. He reports that he does not use illicit drugs.    Exam:    Awake, Alert OX3  Lungs - CTA bilaterally  CV - RRR, no murmurs, distal pulses intact  Abd - soft, non-distended, non-tender, +BS  Extr - No cyanosis or edema    A/P 52 year old year old male in need of upper endoscopy for GERD. Risks, benefits, alternatives, and complications were discussed including the possibility of perforation and the patient agreed to proceed.    Jovanny Fuller MD

## 2017-05-21 ENCOUNTER — MYC MEDICAL ADVICE (OUTPATIENT)
Dept: FAMILY MEDICINE | Facility: CLINIC | Age: 52
End: 2017-05-21

## 2017-05-21 DIAGNOSIS — K21.9 GASTROESOPHAGEAL REFLUX DISEASE WITHOUT ESOPHAGITIS: Primary | ICD-10-CM

## 2017-05-23 NOTE — TELEPHONE ENCOUNTER
Patient was informed of the information below.  Patient wanted to know if you received the information from Dr. Fuller?  Will he prescribe the new med or will Dr. Fuller prescribe the medication?    Thank you  Rashmi PATEL RN

## 2017-05-24 LAB — COPATH REPORT: NORMAL

## 2017-05-31 ENCOUNTER — OFFICE VISIT (OUTPATIENT)
Dept: FAMILY MEDICINE | Facility: CLINIC | Age: 52
End: 2017-05-31
Payer: COMMERCIAL

## 2017-05-31 VITALS
HEART RATE: 72 BPM | WEIGHT: 182.4 LBS | BODY MASS INDEX: 24.74 KG/M2 | SYSTOLIC BLOOD PRESSURE: 115 MMHG | TEMPERATURE: 96.5 F | DIASTOLIC BLOOD PRESSURE: 83 MMHG

## 2017-05-31 DIAGNOSIS — M85.80 OSTEOPENIA: ICD-10-CM

## 2017-05-31 DIAGNOSIS — E03.9 ACQUIRED HYPOTHYROIDISM: ICD-10-CM

## 2017-05-31 DIAGNOSIS — K21.9 GASTROESOPHAGEAL REFLUX DISEASE WITHOUT ESOPHAGITIS: Primary | ICD-10-CM

## 2017-05-31 PROCEDURE — 99214 OFFICE O/P EST MOD 30 MIN: CPT | Performed by: NURSE PRACTITIONER

## 2017-05-31 NOTE — PATIENT INSTRUCTIONS
Thank you for choosing [facility].  You may be receiving a survey in the mail from Pradip Wise regarding your visit today.  Please take a few minutes to complete and return the survey to let us know how we are doing.      If you have questions or concerns, please contact us via [email or web address] or you can contact your care team at [phone].    Our Clinic hours are:  Monday 6:40 am  to 7:00 pm  Tuesday -Friday 6:40 am to 5:00 pm    The [place] outpatient lab hours are:  Monday - Friday 6:10 am to 4:45 pm  Saturdays 7:00 am to 11:00 am  Appointments are required, call [phone]    If you have clinical questions after hours or would like to schedule an appointment,  call the clinic at [phone].

## 2017-05-31 NOTE — MR AVS SNAPSHOT
After Visit Summary   5/31/2017    Marcelo Hawkins    MRN: [id number]           Patient Information     Date Of Birth          1965        Visit Information        Provider Department      5/31/2017 6:40 AM Maya Leam APRN CNP [facility]        Care Instructions          Thank you for choosing [facility].  You may be receiving a survey in the mail from Pradip Wise regarding your visit today.  Please take a few minutes to complete and return the survey to let us know how we are doing.      If you have questions or concerns, please contact us via [employer] or you can contact your care team at [phone].    Our Clinic hours are:  Monday 6:40 am  to 7:00 pm  Tuesday -Friday 6:40 am to 5:00 pm    The [place] outpatient lab hours are:  Monday - Friday 6:10 am to 4:45 pm  Saturdays 7:00 am to 11:00 am  Appointments are required, call [phone]    If you have clinical questions after hours or would like to schedule an appointment,  call the clinic at [phone].          Follow-ups after your visit        Who to contact     If you have questions or need follow up information about today's clinic visit or your schedule please contact [facility] directly at [phone].  Normal or non-critical lab and imaging results will be communicated to you by [facility], letter or phone within 4 business days after the clinic has received the results. If you do not hear from us within 7 days, please contact the clinic through [employer] or phone. If you have a critical or abnormal lab result, we will notify you by phone as soon as possible.  Submit refill requests through [employer] or call your pharmacy and they will forward the refill request to us. Please allow 3 business days for your refill to be completed.          Additional Information About Your Visit        [facility] Information     [facility] gives you secure access to your electronic health record. If you see a primary care  provider, you can also send messages to your care team and make appointments. If you have questions, please call your primary care clinic.  If you do not have a primary care provider, please call [phone] and they will assist you.        Care EveryWhere ID     This is your Care EveryWhere ID. This could be used by other organizations to access your [facility] medical records  [id number]        Your Vitals Were     Pulse Temperature BMI (Body Mass Index)             72 96.5  F (35.8  C) (Tympanic) 24.74 kg/m2          Blood Pressure from Last 3 Encounters:   05/31/17 115/83   05/18/17 (!) 120/95   05/08/17 125/75    Weight from Last 3 Encounters:   05/31/17 182 lb 6.4 oz (82.7 kg)   05/18/17 182 lb (82.6 kg)   05/08/17 182 lb 4 oz (82.7 kg)              Today, you had the following     No orders found for display       Primary Care Provider Office Phone # Fax #    Maya MATTHEW [facility] [phone]       [facility] [address] [facility] [address]        Thank you!     Thank you for choosing [facility]  for your care. Our goal is always to provide you with excellent care. Hearing back from our patients is one way we can continue to improve our services. Please take a few minutes to complete the written survey that you may receive in the mail after your visit with us. Thank you!             Your Updated Medication List - Protect others around you: Learn how to safely use, store and throw away your medicines at www.disposemymeds.org.          This list is accurate as of: 5/31/17  7:17 AM.  Always use your most recent med list.                   Brand Name Dispense Instructions for use    calcium 600 + D 600-400 MG-UNIT per tablet   Generic drug:  calcium-vitamin D     100 tablet    Take 1 tablet by mouth 2 times daily.       levothyroxine 175 MCG tablet    SYNTHROID/LEVOTHROID    90 tablet    Take 1 tablet (175 mcg) by mouth daily       loratadine 10 MG tablet    CLARITIN     90 tablet    Take 1 tablet (10 mg) by mouth daily       MULTIVITAMIN PO          omeprazole 20 MG CR capsule    priLOSEC    30 capsule    Take 1 capsule (20 mg) by mouth daily       order for DME     2 Device    [employer] inserts       simvastatin 20 MG tablet    ZOCOR    90 tablet    Take 1 tablet (20 mg) by mouth At Bedtime

## 2017-05-31 NOTE — PROGRESS NOTES
"  SUBJECTIVE:                                                    Marcelo Hawkins is a 52 year old male who presents to clinic today for the following health issues:    Discuss results of EGD: patient had several questions regarding test and treatment. Started Omeprazole, is trying to avoid foods that increase acid and is sleeping with wedge pillow to \"prop\" himself up at night. Since making these changes he has felt better and slept better at night. Wants to know/discuss timing and interactions with taking Synthroid and Omeprazole.     Medication Followup of omeprazole    Taking Medication as prescribed: yes    Side Effects:  None    Medication Helping Symptoms:  Just took first dose yesterday     GERD: Patient had EGD done 5/18/17:     The nasopharynx and oropharynx were normal.        LA Grade A (one or more mucosal breaks less than 5 mm, not extending        between tops of 2 mucosal folds) esophagitis with no bleeding was found        in the lower third of the esophagus. Biopsies were taken with a cold        forceps for histology.        The Z-line was found at the gastroesophageal junction.        A small hiatal hernia was present.        The exam of the stomach was otherwise normal.        There is no endoscopic evidence of erythema, erythema or inflammatory        changes suggestive of gastritis or ulceration in the stomach.        The examined duodenum was normal.                                                                                     Impression:          - Normal nasopharynx and oropharynx.                        - LA Grade A reflux esophagitis. Biopsied.                        - Z-line, at the gastroesophageal junction.                        - Small hiatal hernia.                        - Normal examined duodenum.   Recommendation:      - Discharge patient to home.                        - Await pathology results.     -------------------------------------    Problem list and histories reviewed & " adjusted, as indicated.  Additional history: as documented    Patient Active Problem List   Diagnosis     Papillary carcinoma of thyroid (H)     Hyperlipidemia LDL goal <130     Osteopenia     Internal hemorrhoids     H/O seasonal allergies     Left foot pain     Hypothyroidism     Anxiety     Past Surgical History:   Procedure Laterality Date     COLONOSCOPY N/A 3/17/2016    Procedure: COLONOSCOPY;  Surgeon: Jovanny Fuller MD;  Location: [facility]     ESOPHAGOSCOPY, GASTROSCOPY, DUODENOSCOPY (EGD), COMBINED N/A 5/18/2017    Procedure: COMBINED ESOPHAGOSCOPY, GASTROSCOPY, DUODENOSCOPY (EGD), BIOPSY SINGLE OR MULTIPLE;  Gastroscopy  ;  Surgeon: Jovanny Fuller MD;  Location: [facility]     SURGICAL HISTORY OF -       hernia times three-right inguinal     SURGICAL HISTORY OF -   1989    thyroidectomy       Social History   Substance Use Topics     Smoking status: Never Smoker     Smokeless tobacco: Never Used     Alcohol use Yes      Comment: 2-3 drinks a week     Family History   Problem Relation Age of Onset     Lipids Mother      Hypertension Father      CANCER Father      sweat gland cancer     Other - See Comments Father 77     IPF - ideomatic pulmonary fibrosis     Lipids Brother      Thyroid Disease Brother      Arthritis Maternal Grandfather      HEART DISEASE Paternal Grandmother      Melanoma No family hx of            Reviewed and updated as needed this visit by clinical staff  Tobacco  Allergies  Med Hx  Surg Hx  Fam Hx  Soc Hx      Reviewed and updated as needed this visit by Provider         ROS:  Constitutional, HEENT, cardiovascular, pulmonary, GI, , musculoskeletal, neuro, skin, endocrine and psych systems are negative, except as otherwise noted.    OBJECTIVE:                                                    /83 (BP Location: Left arm, Patient Position: Chair, Cuff Size: Adult Regular)  Pulse 72  Temp 96.5  F (35.8  C) (Tympanic)  Wt 182 lb 6.4 oz (82.7 kg)  BMI 24.74 kg/m2  Body mass index  is 24.74 kg/(m^2).  GENERAL: healthy, alert and no distress  RESP: unlabored  MS: no gross musculoskeletal defects noted, no edema    Diagnostic Test Results:  none      ASSESSMENT/PLAN:                                                        1. Gastroesophageal reflux disease without esophagitis  Recently had EGD- discussed results with patient   Discussed dietary changes-   Continue to take Omeprazole- discussed timing of medication /interactions with other medications.     2. Osteopenia  Last dexa done in 2013- will repeat later this year- since taking Omeprazole has possibility of causing osteoporosis - discussed with patient     3. Acquired hypothyroidism  Stable- take Levothyroxine 1-2 hrs before taking Omeprazole.           MATTHEW Land[facility]

## 2017-06-06 ENCOUNTER — TELEPHONE (OUTPATIENT)
Dept: FAMILY MEDICINE | Facility: CLINIC | Age: 52
End: 2017-06-06

## 2017-06-06 DIAGNOSIS — E78.5 HYPERLIPIDEMIA LDL GOAL <130: ICD-10-CM

## 2017-06-06 DIAGNOSIS — Z00.00 ROUTINE GENERAL MEDICAL EXAMINATION AT A HEALTH CARE FACILITY: ICD-10-CM

## 2017-06-06 NOTE — TELEPHONE ENCOUNTER
Simvastatin     Last Written Prescription Date: 08/30/2016  Last Fill Quantity: 90, # refills: 3  Last Office Visit with [facility], P or [facility] prescribing provider: 05/31/2017       Lab Results   Component Value Date    CHOL 199 05/12/2017     Lab Results   Component Value Date    HDL 58 05/12/2017     Lab Results   Component Value Date     05/12/2017     Lab Results   Component Value Date    TRIG 140 05/12/2017     Lab Results   Component Value Date    CHOLHDLRATIO 2.6 05/27/2015       Kade HargroveR)

## 2017-06-07 NOTE — TELEPHONE ENCOUNTER
[employer] - lab results for insurance form completed and routed to Maya Lema for review and signature.

## 2017-06-08 RX ORDER — SIMVASTATIN 20 MG
TABLET ORAL
Qty: 90 TABLET | Refills: 3 | Status: SHIPPED | OUTPATIENT
Start: 2017-06-08 | End: 2018-06-28

## 2017-06-08 NOTE — TELEPHONE ENCOUNTER
Prescription approved per [facility] Refill Protocol..  Myesha Tinoco RNC

## 2017-07-17 DIAGNOSIS — K21.9 GASTROESOPHAGEAL REFLUX DISEASE WITHOUT ESOPHAGITIS: ICD-10-CM

## 2017-07-17 NOTE — TELEPHONE ENCOUNTER
Omeprazole    Last Written Prescription Date: 05/24/17  Last Fill Quantity: 30,  # refills: 3   Last Office Visit with G, P or [facility] prescribing provider: 05/31/17

## 2017-07-27 ENCOUNTER — TELEPHONE (OUTPATIENT)
Dept: PODIATRY | Facility: CLINIC | Age: 52
End: 2017-07-27

## 2017-07-27 NOTE — TELEPHONE ENCOUNTER
It has been 2 years since I seen him. I should reevaluate his foot or any changes in the prescription of the orthotic.

## 2017-08-03 ENCOUNTER — OFFICE VISIT (OUTPATIENT)
Dept: PODIATRY | Facility: CLINIC | Age: 52
End: 2017-08-03
Payer: COMMERCIAL

## 2017-08-03 VITALS — HEIGHT: 72 IN | WEIGHT: 192 LBS | BODY MASS INDEX: 26.01 KG/M2

## 2017-08-03 DIAGNOSIS — M77.8 CAPSULITIS OF LEFT FOOT: Primary | ICD-10-CM

## 2017-08-03 PROCEDURE — 99213 OFFICE O/P EST LOW 20 MIN: CPT | Performed by: PODIATRIST

## 2017-08-03 NOTE — MR AVS SNAPSHOT
After Visit Summary   8/3/2017    Marcelo Hawkins    MRN: [id number]           Patient Information     Date Of Birth          1965        Visit Information        Provider Department      8/3/2017 3:40 PM Marcelo Sanford DPM [facility]        Today's Diagnoses     Capsulitis of left foot    -  1      Care Instructions    Initial musculoskeletal treatment recommendation:    1.  Stretch the calf muscles as instructed once an hour.  2.  Ice the injured area in the evening; 20 min on/off.  3.  Take antiinflammatory medication as directed.  4.  Massage the soft tissues around the injured area in the morning to loosen the tissue  5.  Wear supportive foot wear    If no improvement in symptoms within four to six weeks, return to clinic for reevaluation.            Follow-ups after your visit        Additional Services     ORTHOTICS REFERRAL       Please be aware that coverage of these services is subject to the terms and limitations of your health insurance plan.  Call member services at your health plan with any benefit or coverage questions.      Please bring the following to your appointment:    >>   Any x-rays, CTs or MRIs which have been performed.  Contact the facility where they were done to arrange for  prior to your scheduled appointment.  Any new CT, MRI or other procedures ordered by your specialist must be performed at a [facility] facility or coordinated by your clinic's referral office.    >>   List of current medications   >>   This referral request   >>   Any documents/labs given to you for this referral    ==This Referral PRINTS in the [facility] (ORTHOTICS & PROSTHETICS) Central scheduling office ==     The [facility] Central Scheduling staff will contact patient to arrange appointments. Central Scheduling Phone #:  [place], [place]  [phone]     Orthotics: Foot Orthotics with metatarsal pads                  Follow-up notes  from your care team     Return if symptoms worsen or fail to improve.      Who to contact     If you have questions or need follow up information about today's clinic visit or your schedule please contact [facility] directly at [phone].  Normal or non-critical lab and imaging results will be communicated to you by MyChart, letter or phone within 4 business days after the clinic has received the results. If you do not hear from us within 7 days, please contact the clinic through web care [employer] or phone. If you have a critical or abnormal lab result, we will notify you by phone as soon as possible.  Submit refill requests through [facility] or call your pharmacy and they will forward the refill request to us. Please allow 3 business days for your refill to be completed.          Additional Information About Your Visit        web care [employer] Information     [facility] gives you secure access to your electronic health record. If you see a primary care provider, you can also send messages to your care team and make appointments. If you have questions, please call your primary care clinic.  If you do not have a primary care provider, please call [phone] and they will assist you.        Care EveryWhere ID     This is your Care EveryWhere ID. This could be used by other organizations to access your [facility] medical records  [id number]        Your Vitals Were     Height BMI (Body Mass Index)                1.829 m (6') 26.04 kg/m2           Blood Pressure from Last 3 Encounters:   05/31/17 115/83   05/18/17 (!) 120/95   05/08/17 125/75    Weight from Last 3 Encounters:   08/03/17 87.1 kg (192 lb)   05/31/17 82.7 kg (182 lb 6.4 oz)   05/18/17 82.6 kg (182 lb)              We Performed the Following     ORTHOTICS REFERRAL        Primary Care Provider Office Phone # Fax #    MATTHEW Landry [facility] [phone]       [facility] [address] [address]        Equal  Access to Services     [facility]: Hadii aad ku hadvickstephon Nhungmarcell, wabeda luqadaha, qaybta kaalmazackary bowles. So [facility] [phone].    ATENCIÓN: Si mohamudla davide, tiene a bhatti disposición servicios gratuitos de asistencia lingüística. Llame al [phone].    We comply with applicable federal civil rights laws and [place] laws. We do not discriminate on the basis of race, color, national origin, age, disability sex, sexual orientation or gender identity.            Thank you!     Thank you for choosing [facility]  for your care. Our goal is always to provide you with excellent care. Hearing back from our patients is one way we can continue to improve our services. Please take a few minutes to complete the written survey that you may receive in the mail after your visit with us. Thank you!             Your Updated Medication List - Protect others around you: Learn how to safely use, store and throw away your medicines at www.disposemymeds.org.          This list is accurate as of: 8/3/17  3:56 PM.  Always use your most recent med list.                   Brand Name Dispense Instructions for use Diagnosis    calcium 600 + D 600-400 MG-UNIT per tablet   Generic drug:  calcium-vitamin D     100 tablet    Take 1 tablet by mouth 2 times daily.        levothyroxine 175 MCG tablet    SYNTHROID/LEVOTHROID    90 tablet    Take 1 tablet (175 mcg) by mouth daily    Postoperative hypothyroidism       loratadine 10 MG tablet    CLARITIN    90 tablet    Take 1 tablet (10 mg) by mouth daily    H/O seasonal allergies       MULTIVITAMIN PO           omeprazole 20 MG CR capsule    priLOSEC    90 capsule    Take 1 capsule by mouth  daily    Gastroesophageal reflux disease without esophagitis       order for DME     2 Device    Superfeet inserts    Left foot pain, Neuroma, Faust's, left       simvastatin 20 MG tablet    ZOCOR    90 tablet    Take 1 tablet by  mouth at  bedtime    Routine general medical examination at a health care facility, Hyperlipidemia LDL goal <130

## 2017-08-03 NOTE — LETTER
8/3/2017         RE: Mareclo Hawkins  [address] [address] [place] [id number]        Dear Colleague,    Thank you for referring your patient, Marcelo Hawkins, to the [facility]. Please see a copy of my visit note below.    PATIENT HISTORY:  Marcelo Hawkins is a 52 year old male who presents to clinic for a painful left foot .  The patient describes the pain as sharp stabbing.  The patient relates pain is located on the ball of the left foot.  The patient relates the pain has been present for the past several weeks.  The patient relates pain with ambulation.  The patient has tried different shoes with little relief.       REVIEW OF SYSTEMS:  Constitutional, HEENT, cardiovascular, pulmonary, GI, , musculoskeletal, neuro, skin, endocrine and psych systems are negative, except as otherwise noted.     PAST MEDICAL HISTORY:   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, residual hypothyroidism     Other and unspecified hyperlipidemia      Postsurgical hypothyroidism     s/p thryoidectomy         PAST SURGICAL HISTORY:   Past Surgical History:   Procedure Laterality Date     COLONOSCOPY N/A 3/17/2016    Procedure: COLONOSCOPY;  Surgeon: Jovanny Fuller MD;  Location: [facility]     ESOPHAGOSCOPY, GASTROSCOPY, DUODENOSCOPY (EGD), COMBINED N/A 5/18/2017    Procedure: COMBINED ESOPHAGOSCOPY, GASTROSCOPY, DUODENOSCOPY (EGD), BIOPSY SINGLE OR MULTIPLE;  Gastroscopy  ;  Surgeon: Jovanny Fuller MD;  Location: [facility]     SURGICAL HISTORY OF -       hernia times three-right inguinal     SURGICAL HISTORY OF -   1989    thyroidectomy        MEDICATIONS:   Current Outpatient Prescriptions:      omeprazole (PRILOSEC) 20 MG CR capsule, Take 1 capsule by mouth  daily, Disp: 90 capsule, Rfl: 0     simvastatin (ZOCOR) 20 MG tablet, Take 1 tablet by mouth at  bedtime, Disp: 90 tablet, Rfl: 3     levothyroxine (SYNTHROID/LEVOTHROID) 175  MCG tablet, Take 1 tablet (175 mcg) by mouth daily, Disp: 90 tablet, Rfl: 1     loratadine (CLARITIN) 10 MG tablet, Take 1 tablet (10 mg) by mouth daily, Disp: 90 tablet, Rfl: 3     ORDER FOR DME, Superfeet inserts, Disp: 2 Device, Rfl: 0     Multiple Vitamins-Minerals (MULTIVITAMIN OR), , Disp: , Rfl:      calcium-vitamin D (CALCIUM 600 + D) 600-400 MG-UNIT per tablet, Take 1 tablet by mouth 2 times daily., Disp: 100 tablet, Rfl: 3     ALLERGIES:    Allergies   Allergen Reactions     Nka [No Known Allergies]         SOCIAL HISTORY:   Social History     Social History     Marital status:      Spouse name: Merline     Number of children: 2     Years of education: N/A     Occupational History      [employer]     Social History Main Topics     Smoking status: Never Smoker     Smokeless tobacco: Never Used     Alcohol use Yes      Comment: 2-3 drinks a week     Drug use: No     Sexual activity: Yes     Partners: Female     Birth control/ protection: Surgical     Other Topics Concern     Caffeine Concern Yes     1 coffee daily     Exercise Yes     walks & Running-3 x days a week     Seat Belt Yes     Self-Exams Yes     Parent/Sibling W/ Cabg, Mi Or Angioplasty Before 65f 55m? No     Social History Narrative        FAMILY HISTORY:   Family History   Problem Relation Age of Onset     Lipids Mother      Hypertension Father      CANCER Father      sweat gland cancer     Other - See Comments Father 77     IPF - ideomatic pulmonary fibrosis     Lipids Brother      Thyroid Disease Brother      Arthritis Maternal Grandfather      HEART DISEASE Paternal Grandmother      Melanoma No family hx of         EXAM:Vitals: Ht 1.829 m (6')  Wt 87.1 kg (192 lb)  BMI 26.04 kg/m2  BMI= Body mass index is 26.04 kg/(m^2).    Weight management plan: Patient was referred to their PCP to discuss a diet and exercise plan.    General appearance: Patient is alert and fully cooperative with history & exam.  No sign of distress is noted  during the visit.     Psychiatric: Affect is pleasant & appropriate.  Patient appears motivated to improve health.     Respiratory: Breathing is regular & unlabored while sitting.     HEENT: Hearing is intact to spoken word.  Speech is clear.  No gross evidence of visual impairment that would impact ambulation.     Dermatologic: Skin is intact to both lower extremities without significant lesions, rash or abrasion.  No paronychia or evidence of soft tissue infection is noted.     Vascular: DP & PT pulses are intact & regular bilaterally.  No significant edema or varicosities noted.  CFT and skin temperature is normal to both lower extremities.     Neurologic: Lower extremity sensation is intact to light touch.  No evidence of weakness or contracture in the lower extremities.  No evidence of neuropathy.     Musculoskeletal: Patient is ambulatory without assistive device or brace.  No gross ankle deformity noted.  No foot or ankle joint effusion is noted.  Noted pain on the plantar aspect of the second metatarsophalangeal joint on the left foot. Noted positive Lachman's test and second metatarsophalangeal joint on the left foot. No surrounding erythema noted.       ASSESSMENT / PLAN:     ICD-10-CM    1. Capsulitis of left foot M77.52 ORTHOTICS REFERRAL       I have explained to Marcelo  about the conditions.  We discussed the nature of the condition as well as the treatment plan and expected length of recovery.  At this time, the patient was instructed on icing, stretching, tissue massage and support.  The patient was referred to [facility] for custom orthotics that will aid in offloading the tension forces to the soft tissues and prevent further inflammation.   The patient will return in four weeks for reevaluation if the symptoms do not resolve.        Disclaimer: This note consists of symbols derived from keyboarding, dictation and/or voice recognition software. As a result, there may be errors  in the script that have gone undetected. Please consider this when interpreting information found in this chart.       SYEDA Sanford D.P.M., F.A.C.F.A.S.        Again, thank you for allowing me to participate in the care of your patient.        Sincerely,        Marcelo Sanford DPM

## 2017-08-03 NOTE — PROGRESS NOTES
PATIENT HISTORY:  Marcelo Hawkins is a 52 year old male who presents to clinic for a painful left foot .  The patient describes the pain as sharp stabbing.  The patient relates pain is located on the ball of the left foot.  The patient relates the pain has been present for the past several weeks.  The patient relates pain with ambulation.  The patient has tried different shoes with little relief.       REVIEW OF SYSTEMS:  Constitutional, HEENT, cardiovascular, pulmonary, GI, , musculoskeletal, neuro, skin, endocrine and psych systems are negative, except as otherwise noted.     PAST MEDICAL HISTORY:   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, residual hypothyroidism     Other and unspecified hyperlipidemia      Postsurgical hypothyroidism     s/p thryoidectomy         PAST SURGICAL HISTORY:   Past Surgical History:   Procedure Laterality Date     COLONOSCOPY N/A 3/17/2016    Procedure: COLONOSCOPY;  Surgeon: Jovanny Fuller MD;  Location: [facility]     ESOPHAGOSCOPY, GASTROSCOPY, DUODENOSCOPY (EGD), COMBINED N/A 5/18/2017    Procedure: COMBINED ESOPHAGOSCOPY, GASTROSCOPY, DUODENOSCOPY (EGD), BIOPSY SINGLE OR MULTIPLE;  Gastroscopy  ;  Surgeon: Jovanny Fuller MD;  Location: [facility]     SURGICAL HISTORY OF -       hernia times three-right inguinal     SURGICAL HISTORY OF -   1989    thyroidectomy        MEDICATIONS:   Current Outpatient Prescriptions:      omeprazole (PRILOSEC) 20 MG CR capsule, Take 1 capsule by mouth  daily, Disp: 90 capsule, Rfl: 0     simvastatin (ZOCOR) 20 MG tablet, Take 1 tablet by mouth at  bedtime, Disp: 90 tablet, Rfl: 3     levothyroxine (SYNTHROID/LEVOTHROID) 175 MCG tablet, Take 1 tablet (175 mcg) by mouth daily, Disp: 90 tablet, Rfl: 1     loratadine (CLARITIN) 10 MG tablet, Take 1 tablet (10 mg) by mouth daily, Disp: 90 tablet, Rfl: 3     ORDER FOR DME, Superfeet inserts, Disp: 2 Device, Rfl: 0     Multiple  Vitamins-Minerals (MULTIVITAMIN OR), , Disp: , Rfl:      calcium-vitamin D (CALCIUM 600 + D) 600-400 MG-UNIT per tablet, Take 1 tablet by mouth 2 times daily., Disp: 100 tablet, Rfl: 3     ALLERGIES:    Allergies   Allergen Reactions     Nka [No Known Allergies]         SOCIAL HISTORY:   Social History     Social History     Marital status:      Spouse name: Merline     Number of children: 2     Years of education: N/A     Occupational History      [employer]     Social History Main Topics     Smoking status: Never Smoker     Smokeless tobacco: Never Used     Alcohol use Yes      Comment: 2-3 drinks a week     Drug use: No     Sexual activity: Yes     Partners: Female     Birth control/ protection: Surgical     Other Topics Concern     Caffeine Concern Yes     1 coffee daily     Exercise Yes     walks & Running-3 x days a week     Seat Belt Yes     Self-Exams Yes     Parent/Sibling W/ Cabg, Mi Or Angioplasty Before 65f 55m? No     Social History Narrative        FAMILY HISTORY:   Family History   Problem Relation Age of Onset     Lipids Mother      Hypertension Father      CANCER Father      sweat gland cancer     Other - See Comments Father 77     IPF - ideomatic pulmonary fibrosis     Lipids Brother      Thyroid Disease Brother      Arthritis Maternal Grandfather      HEART DISEASE Paternal Grandmother      Melanoma No family hx of         EXAM:Vitals: Ht 1.829 m (6')  Wt 87.1 kg (192 lb)  BMI 26.04 kg/m2  BMI= Body mass index is 26.04 kg/(m^2).    Weight management plan: Patient was referred to their PCP to discuss a diet and exercise plan.    General appearance: Patient is alert and fully cooperative with history & exam.  No sign of distress is noted during the visit.     Psychiatric: Affect is pleasant & appropriate.  Patient appears motivated to improve health.     Respiratory: Breathing is regular & unlabored while sitting.     HEENT: Hearing is intact to spoken word.  Speech is clear.  No gross  evidence of visual impairment that would impact ambulation.     Dermatologic: Skin is intact to both lower extremities without significant lesions, rash or abrasion.  No paronychia or evidence of soft tissue infection is noted.     Vascular: DP & PT pulses are intact & regular bilaterally.  No significant edema or varicosities noted.  CFT and skin temperature is normal to both lower extremities.     Neurologic: Lower extremity sensation is intact to light touch.  No evidence of weakness or contracture in the lower extremities.  No evidence of neuropathy.     Musculoskeletal: Patient is ambulatory without assistive device or brace.  No gross ankle deformity noted.  No foot or ankle joint effusion is noted.  Noted pain on the plantar aspect of the second metatarsophalangeal joint on the left foot. Noted positive Lachman's test and second metatarsophalangeal joint on the left foot. No surrounding erythema noted.       ASSESSMENT / PLAN:     ICD-10-CM    1. Capsulitis of left foot M77.52 ORTHOTICS REFERRAL       I have explained to Marcelo  about the conditions.  We discussed the nature of the condition as well as the treatment plan and expected length of recovery.  At this time, the patient was instructed on icing, stretching, tissue massage and support.  The patient was referred to [facility] for custom orthotics that will aid in offloading the tension forces to the soft tissues and prevent further inflammation.   The patient will return in four weeks for reevaluation if the symptoms do not resolve.        Disclaimer: This note consists of symbols derived from keyboarding, dictation and/or voice recognition software. As a result, there may be errors in the script that have gone undetected. Please consider this when interpreting information found in this chart.       SYEDA Sanford D.P.M., WESTLEY.F.A.S.

## 2017-09-08 DIAGNOSIS — E89.0 POSTOPERATIVE HYPOTHYROIDISM: ICD-10-CM

## 2017-09-08 LAB — TSH SERPL DL<=0.005 MIU/L-ACNC: 1.88 MU/L (ref 0.4–4)

## 2017-09-08 PROCEDURE — 84443 ASSAY THYROID STIM HORMONE: CPT | Performed by: NURSE PRACTITIONER

## 2017-09-08 PROCEDURE — 36415 COLL VENOUS BLD VENIPUNCTURE: CPT | Performed by: NURSE PRACTITIONER

## 2017-09-10 DIAGNOSIS — E89.0 POSTOPERATIVE HYPOTHYROIDISM: ICD-10-CM

## 2017-09-11 ENCOUNTER — MYC MEDICAL ADVICE (OUTPATIENT)
Dept: FAMILY MEDICINE | Facility: CLINIC | Age: 52
End: 2017-09-11

## 2017-09-11 DIAGNOSIS — E89.0 POSTOPERATIVE HYPOTHYROIDISM: Primary | ICD-10-CM

## 2017-09-11 NOTE — TELEPHONE ENCOUNTER
Synthroid    Last Written Prescription Date: 05/09/17  Last Quantity: 90, # refills: 1  Last Office Visit with [facility], P or [facility] prescribing provider: 05/31/17        TSH   Date Value Ref Range Status   09/08/2017 1.88 0.40 - 4.00 mU/L Final

## 2017-09-15 RX ORDER — LEVOTHYROXINE SODIUM 175 MCG
TABLET ORAL
Qty: 90 TABLET | Refills: 1 | Status: SHIPPED | OUTPATIENT
Start: 2017-09-15 | End: 2017-12-26

## 2017-09-15 NOTE — TELEPHONE ENCOUNTER
Prescription approved per [facility] Refill Protocol.  Per last Mychart message patient is rechecking his tsh in 6 months.    Deana Mccormack RN

## 2017-09-24 DIAGNOSIS — K21.9 GASTROESOPHAGEAL REFLUX DISEASE WITHOUT ESOPHAGITIS: ICD-10-CM

## 2017-09-24 NOTE — TELEPHONE ENCOUNTER
Omeprazole      Last Written Prescription Date: 08/01/17  Last Fill Quantity: 90,  # refills: 0   Last Office Visit with [facility], P or [facility] prescribing provider: 05/31/17

## 2017-09-26 NOTE — TELEPHONE ENCOUNTER
Prescription approved per [facility] Refill Protocol.    Jocelyn ONEILL Rn

## 2017-10-03 DIAGNOSIS — M85.80 OSTEOPENIA, UNSPECIFIED LOCATION: Primary | ICD-10-CM

## 2017-10-26 ENCOUNTER — HOSPITAL ENCOUNTER (OUTPATIENT)
Dept: BONE DENSITY | Facility: CLINIC | Age: 52
Discharge: HOME | End: 2017-10-26
Attending: NURSE PRACTITIONER | Admitting: NURSE PRACTITIONER
Payer: COMMERCIAL

## 2017-10-26 DIAGNOSIS — M85.80 OSTEOPENIA, UNSPECIFIED LOCATION: ICD-10-CM

## 2017-10-26 PROCEDURE — 77080 DXA BONE DENSITY AXIAL: CPT

## 2017-10-30 ENCOUNTER — MYC MEDICAL ADVICE (OUTPATIENT)
Dept: FAMILY MEDICINE | Facility: CLINIC | Age: 52
End: 2017-10-30

## 2017-11-02 ENCOUNTER — OFFICE VISIT (OUTPATIENT)
Dept: FAMILY MEDICINE | Facility: CLINIC | Age: 52
End: 2017-11-02
Payer: COMMERCIAL

## 2017-11-02 VITALS
BODY MASS INDEX: 23.84 KG/M2 | TEMPERATURE: 97.2 F | OXYGEN SATURATION: 98 % | WEIGHT: 176 LBS | SYSTOLIC BLOOD PRESSURE: 135 MMHG | DIASTOLIC BLOOD PRESSURE: 83 MMHG | HEIGHT: 72 IN | HEART RATE: 51 BPM

## 2017-11-02 DIAGNOSIS — K21.9 GASTROESOPHAGEAL REFLUX DISEASE WITHOUT ESOPHAGITIS: ICD-10-CM

## 2017-11-02 DIAGNOSIS — M85.80 OSTEOPENIA, UNSPECIFIED LOCATION: Primary | ICD-10-CM

## 2017-11-02 DIAGNOSIS — E89.0 POSTSURGICAL HYPOTHYROIDISM: ICD-10-CM

## 2017-11-02 DIAGNOSIS — I83.93 VARICOSE VEINS OF BOTH LOWER EXTREMITIES: ICD-10-CM

## 2017-11-02 DIAGNOSIS — N52.8 OTHER MALE ERECTILE DYSFUNCTION: ICD-10-CM

## 2017-11-02 DIAGNOSIS — C73 PAPILLARY CARCINOMA OF THYROID (H): ICD-10-CM

## 2017-11-02 PROCEDURE — 99215 OFFICE O/P EST HI 40 MIN: CPT | Performed by: NURSE PRACTITIONER

## 2017-11-02 RX ORDER — SILDENAFIL 50 MG/1
50 TABLET, FILM COATED ORAL DAILY PRN
Qty: 5 TABLET | Refills: 3 | Status: SHIPPED | OUTPATIENT
Start: 2017-11-02 | End: 2017-11-02

## 2017-11-02 RX ORDER — SILDENAFIL 50 MG/1
50 TABLET, FILM COATED ORAL DAILY PRN
Qty: 5 TABLET | Refills: 3 | Status: SHIPPED | OUTPATIENT
Start: 2017-11-02 | End: 2017-12-11

## 2017-11-02 NOTE — MR AVS SNAPSHOT
After Visit Summary   11/2/2017    Marcelo Hawkins    MRN: [id number]           Patient Information     Date Of Birth          1965        Visit Information        Provider Department      11/2/2017 1:40 PM Maya Lema APRN [facility]        Today's Diagnoses     Osteopenia, unspecified location    -  1    Other male erectile dysfunction          Care Instructions    1. Taper off of use of Omeprazole- 1 tablet every other day for 2 weeks and then stop taking  2. Start to take Zantac 150 mg twice a day as needed       Vitamin D:  Testosterone:  Bone TurnOver Markers:  DEXA:  IMPRESSION:  1. The T-score of the lumbar spine on today's study in region of L1-L4 is -1.9 which correlates with moderate osteopenia. This T-score has slightly worsened compared to the prior study where it was -1.8. If one looks at the L4 vertebral body alone the T score is -2.3 which correlates with severe osteopenia. This T score has slightly worsened compared to the prior study where it was -2.2.    2. The T-score of the right femoral neck on today's study is -2.4 which correlates with severe osteopenia. This T-score has worsened compared to prior study where it was -2.2.    3. The T-score of the left femoral neck on today's study is -2.3 which correlates with severe osteopenia. This T-score has slightly worsened compared to the prior study where it was -2.2.    NOTE: With regards to the hips on the current study, the T-score of either the femoral neck or the total hip is reported, whichever is worse.    All pertinent notes, labs, and images personally reviewed by me.      A/P  Mr.John Hawkins is a 48 year old here for the evaluation of:     #1 Osteopenia.      Risk factors for low bone density include personal history of fracture or family history, , advanced age, female, dementia, and poor health.  Also smoking, low BMI, Estrogen deficiency, low Ca intake, and alcoholism.  A prior history of  vertebral fracture greatly increases the risk of subsequent fractures. A history of other medical diseases impacting on bone may also affect bone health.       Initial evaluation for secondary causes of osteoporosis, such as renal or liver disease, hyperparathyroidism, Hypogonadism, Cushing's syndrome, celiac disease and other forms of malabsorption.  To examine secondary causes I will obtain Testosterone and Vitamin D.  Because Marcelo is a male, we need to look at his Z scores not T scores.  Based on this he has mild osteopenia.  Osteopenia is a classification not a disease.  I have asked him to stop his Fosmax until further evaluation.      #2 Papillary Thyroid Cancer.  He is to bring records with him to his next visit.  Will obtain TSH, freeT4, TG, and TG Ab.  TSH goal based on those results.          Labs ordered today:       Orders Placed This Encounter   Procedures     Testosterone free and total     Follicle stimulating hormone     Lutropin     TSH     T4 FREE     Thyroglobulin and antibody     Vitamin D Deficiency      Radiology/Consults ordered today: None     Follow-up:  To Be Determined based on blood work.                   Follow-ups after your visit        Your next 10 appointments already scheduled     Nov 10, 2017  6:10 AM CST   LAB with [facility] ([facility])    [address] [address]   [phone]           Please do not eat 10-12 hours before your appointment if you are coming in fasting for labs on lipids, cholesterol, or glucose (sugar). This does not apply to pregnant women. Water, hot tea and black coffee (with nothing added) are okay. Do not drink other fluids, diet soda or chew gum.            Nov 17, 2017  7:20 AM CST   Return Visit with Stefani Kaye PA-C   [facility]    [address] [address]   [phone]              Who to contact     If you have  questions or need follow up information about today's clinic visit or your schedule please contact [facility] directly at [phone].  Normal or non-critical lab and imaging results will be communicated to you by TreatFeedhart, letter or phone within 4 business days after the clinic has received the results. If you do not hear from us within 7 days, please contact the clinic through TreatFeedhart or phone. If you have a critical or abnormal lab result, we will notify you by phone as soon as possible.  Submit refill requests through [employer] or call your pharmacy and they will forward the refill request to us. Please allow 3 business days for your refill to be completed.          Additional Information About Your Visit        TreatFeedharForseva Information     [employer] gives you secure access to your electronic health record. If you see a primary care provider, you can also send messages to your care team and make appointments. If you have questions, please call your primary care clinic.  If you do not have a primary care provider, please call [phone] and they will assist you.        Care EveryWhere ID     This is your Care EveryWhere ID. This could be used by other organizations to access your [place] medical records  [id number]        Your Vitals Were     Pulse Temperature Height Pulse Oximetry BMI (Body Mass Index)       51 97.2  F (36.2  C) (Tympanic) 6' (1.829 m) 98% 23.87 kg/m2        Blood Pressure from Last 3 Encounters:   11/02/17 135/83   05/31/17 115/83   05/18/17 (!) 120/95    Weight from Last 3 Encounters:   11/02/17 176 lb (79.8 kg)   08/03/17 192 lb (87.1 kg)   05/31/17 182 lb 6.4 oz (82.7 kg)              Today, you had the following     No orders found for display         Today's Medication Changes          These changes are accurate as of: 11/2/17  2:12 PM.  If you have any questions, ask your nurse or doctor.               Start taking these medicines.        Dose/Directions    sildenafil 50 MG  tablet   Commonly known as:  VIAGRA   Used for:  Other male erectile dysfunction   Started by:  Maya Lema APRN CNP        Dose:  50 mg   Take 1 tablet (50 mg) by mouth daily as needed 30 min to 4 hrs before sex. Do not use with nitroglycerin, terazosin or doxazosin.   Quantity:  5 tablet   Refills:  3            Where to get your medicines      These medications were sent to [place] [facility] [place] - [address]  [address], [place] [address]    Hours:  [facility] Phone:  [phone]     sildenafil 50 MG tablet                Primary Care Provider Office Phone # Fax #    MATTHEW Landry [phone] [address] [address]        Equal Access to Services     MARCIAL MONTES DE OCA : Hadii reynold mendoza hadasho Soomaali, waaxda luqadaha, qaybta kaalmada adeegyada, zackary luevano . So [facility] [phone].    ATENCIÓN: Si habla español, tiene a bhatti disposición servicios gratuitos de asistencia lingüística. Samariaame al [phone].    We comply with applicable federal civil rights laws and [place] laws. We do not discriminate on the basis of race, color, national origin, age, disability, sex, sexual orientation, or gender identity.            Thank you!     Thank you for choosing [facility]  for your care. Our goal is always to provide you with excellent care. Hearing back from our patients is one way we can continue to improve our services. Please take a few minutes to complete the written survey that you may receive in the mail after your visit with us. Thank you!             Your Updated Medication List - Protect others around you: Learn how to safely use, store and throw away your medicines at www.disposemymeds.org.          This list is accurate as of: 11/2/17  2:12 PM.  Always use your most recent med list.                   Brand Name Dispense Instructions for use Diagnosis    calcium  600 + D 600-400 MG-UNIT per tablet   Generic drug:  calcium-vitamin D     100 tablet    Take 1 tablet by mouth 2 times daily.        loratadine 10 MG tablet    CLARITIN    90 tablet    Take 1 tablet (10 mg) by mouth daily    H/O seasonal allergies       MULTIVITAMIN PO           omeprazole 20 MG CR capsule    priLOSEC    90 capsule    TAKE 1 CAPSULE BY MOUTH  DAILY    Gastroesophageal reflux disease without esophagitis       order for DME     2 Device    Superfeet inserts    Left foot pain, Neuroma, Faust's, left       sildenafil 50 MG tablet    VIAGRA    5 tablet    Take 1 tablet (50 mg) by mouth daily as needed 30 min to 4 hrs before sex. Do not use with nitroglycerin, terazosin or doxazosin.    Other male erectile dysfunction       simvastatin 20 MG tablet    ZOCOR    90 tablet    Take 1 tablet by mouth at  bedtime    Routine general medical examination at a health care facility, Hyperlipidemia LDL goal <130       SYNTHROID 175 MCG tablet   Generic drug:  levothyroxine     90 tablet    Take 1 tablet by mouth  daily    Postoperative hypothyroidism

## 2017-11-02 NOTE — PATIENT INSTRUCTIONS
1. Taper off of use of Omeprazole- 1 tablet every other day for 2 weeks and then stop taking  2. Start to take Zantac 150 mg twice a day as needed       Vitamin D:  Testosterone:  Bone TurnOver Markers:  DEXA:  IMPRESSION:  1. The T-score of the lumbar spine on today's study in region of L1-L4 is -1.9 which correlates with moderate osteopenia. This T-score has slightly worsened compared to the prior study where it was -1.8. If one looks at the L4 vertebral body alone the T score is -2.3 which correlates with severe osteopenia. This T score has slightly worsened compared to the prior study where it was -2.2.    2. The T-score of the right femoral neck on today's study is -2.4 which correlates with severe osteopenia. This T-score has worsened compared to prior study where it was -2.2.    3. The T-score of the left femoral neck on today's study is -2.3 which correlates with severe osteopenia. This T-score has slightly worsened compared to the prior study where it was -2.2.    NOTE: With regards to the hips on the current study, the T-score of either the femoral neck or the total hip is reported, whichever is worse.    All pertinent notes, labs, and images personally reviewed by me.      A/P  Mr.John Hawkins is a 48 year old here for the evaluation of:     #1 Osteopenia.      Risk factors for low bone density include personal history of fracture or family history, , advanced age, female, dementia, and poor health.  Also smoking, low BMI, Estrogen deficiency, low Ca intake, and alcoholism.  A prior history of vertebral fracture greatly increases the risk of subsequent fractures. A history of other medical diseases impacting on bone may also affect bone health.       Initial evaluation for secondary causes of osteoporosis, such as renal or liver disease, hyperparathyroidism, Hypogonadism, Cushing's syndrome, celiac disease and other forms of malabsorption.  To examine secondary causes I will obtain Testosterone  and Vitamin D.  Because Marcelo is a male, we need to look at his Z scores not T scores.  Based on this he has mild osteopenia.  Osteopenia is a classification not a disease.  I have asked him to stop his Fosmax until further evaluation.      #2 Papillary Thyroid Cancer.  He is to bring records with him to his next visit.  Will obtain TSH, freeT4, TG, and TG Ab.  TSH goal based on those results.          Labs ordered today:       Orders Placed This Encounter   Procedures     Testosterone free and total     Follicle stimulating hormone     Lutropin     TSH     T4 FREE     Thyroglobulin and antibody     Vitamin D Deficiency      Radiology/Consults ordered today: None     Follow-up:  To Be Determined based on blood work.

## 2017-11-02 NOTE — NURSING NOTE
Initial /83 (BP Location: Left arm, Patient Position: Chair, Cuff Size: Adult Large)  Pulse 51  Temp 97.2  F (36.2  C) (Tympanic)  Ht 6' (1.829 m)  Wt 176 lb (79.8 kg)  SpO2 98%  BMI 23.87 kg/m2 Estimated body mass index is 23.87 kg/(m^2) as calculated from the following:    Height as of this encounter: 6' (1.829 m).    Weight as of this encounter: 176 lb (79.8 kg). .    Jeanie Nunez

## 2017-11-02 NOTE — PROGRESS NOTES
"  SUBJECTIVE:   Marcelo Hawkins is a 52 year old male who presents to clinic today for the following health issues:    Patient with history of papillar carcinoma of thyroid post thyroid removal  In 1989 [facility]-     History of surgical hypothyroidism. Target TSH 0.4-1.4. No new symptoms.     1989 - [facility] (total thyroidectomy).  Was started on Syn 225mcg.  Noted nodule while shaving.    + papillary thyroid cancer.  + radioactive iodine ablation (100mCi)  1998/2000 ([place]) --> IM was seen by Endo --> was switched to Levothyroxine.   [id number] (Levoxyl 150mcg)    Bone Density scan: 2017- compared to 2013- overall stable scans.     FINDINGS:   Lumbar spine: The T-score is -2.1 between L1 and L4. On the prior  study, the T-score was -1.9.     Hips:  The right hip T-score is -1.4. The left hip T-score is -1.6.    Bone mineral density in the worst hip is 0.865 gm/cm2. On the prior  study, the right and left hip T-scores were -1.3 and -1.5,  respectively.    IMPRESSION:  1. Moderate-to-severe osteopenia in the lumbar spine.  2. Moderate osteopenia in the hips.    Patient had last seen Endocrinology 1/2014- \" Because Marcelo is a male, we need to look at his Z scores not T scores.  Based on this he has mild osteopenia.  Osteopenia is a classification not a disease- .\"  Endocrinology did not recommend bisphosphonate.       GERD: patient has been taking Omeprazole which is working well- however known to cause osteoporosis.    EGD- 5/2017:   LA Grade A reflux esophagitis. Biopsied.                        - Z-line, at the gastroesophageal junction.                        - Small hiatal hernia.                        - Normal examined duodenum.   Recommendation:      - Discharge patient to home.                        - Await pathology results.                        - Return to referring physician PRN    Vericose veins: has \" a few\" on lower legs- do not cause pain with walking- no discomfort just notices that they are there.     ED: " "ongoing for 1 year- erections are \"weak\" - no history of heart disease- wants treatment.   -------------------------------------    Problem list and histories reviewed & adjusted, as indicated.  Additional history: as documented    Patient Active Problem List   Diagnosis     Papillary carcinoma of thyroid (H)     Hyperlipidemia LDL goal <130     Osteopenia     Internal hemorrhoids     H/O seasonal allergies     Left foot pain     Anxiety     Gastroesophageal reflux disease without esophagitis     Postsurgical hypothyroidism     Past Surgical History:   Procedure Laterality Date     COLONOSCOPY N/A 3/17/2016    Procedure: COLONOSCOPY;  Surgeon: Jovanny Fuller MD;  Location: [facility]     ESOPHAGOSCOPY, GASTROSCOPY, DUODENOSCOPY (EGD), COMBINED N/A 5/18/2017    Procedure: COMBINED ESOPHAGOSCOPY, GASTROSCOPY, DUODENOSCOPY (EGD), BIOPSY SINGLE OR MULTIPLE;  Gastroscopy  ;  Surgeon: Jovanny Fuller MD;  Location: [facility]     SURGICAL HISTORY OF -       hernia times three-right inguinal     SURGICAL HISTORY OF -   1989    thyroidectomy       Social History   Substance Use Topics     Smoking status: Never Smoker     Smokeless tobacco: Never Used     Alcohol use Yes      Comment: 2-3 drinks a week     Family History   Problem Relation Age of Onset     Lipids Mother      Hypertension Father      CANCER Father      sweat gland cancer     Other - See Comments Father 77     IPF - ideomatic pulmonary fibrosis     Lipids Brother      Thyroid Disease Brother      Arthritis Maternal Grandfather      HEART DISEASE Paternal Grandmother      Melanoma No family hx of              Reviewed and updated as needed this visit by clinical staff       Reviewed and updated as needed this visit by Provider         ROS:  Constitutional, HEENT, cardiovascular, pulmonary, GI, , musculoskeletal, neuro, skin, endocrine and psych systems are negative, except as otherwise noted.      OBJECTIVE:   /83 (BP Location: Left arm, Patient Position: Chair, " Cuff Size: Adult Large)  Pulse 51  Temp 97.2  F (36.2  C) (Tympanic)  Ht 6' (1.829 m)  Wt 176 lb (79.8 kg)  SpO2 98%  BMI 23.87 kg/m2  Body mass index is 23.87 kg/(m^2).  GENERAL: healthy, alert and no distress  RESP: lungs clear to auscultation - no rales, rhonchi or wheezes  CV: regular rate and rhythm, normal S1 S2, no S3 or S4, no murmur, click or rub, no peripheral edema and peripheral pulses strong- few scattered varicose veins of lower legs.   ABDOMEN: soft, nontender, no hepatosplenomegaly, no masses and bowel sounds normal  MS: no gross musculoskeletal defects noted, no edema    Diagnostic Test Results:  none     ASSESSMENT/PLAN:     1. Papillary carcinoma of thyroid (H)       2. Postsurgical hypothyroidism  TSH has been stable on Synthroid - unable to tolerate generic    3. Osteopenia, unspecified location  - reviewed bone density results with patient   Stable classification - recommend continue PTC Ca/Vit. D supplement     4. Gastroesophageal reflux disease without esophagitis  Resolved- recommend taper use of Omeprazole till off since Omeprazole can cause osteoporosis   - use Zantac prn     5. Other male erectile dysfunction  No history of heart disease- reviewed causes of ED   - sildenafil (VIAGRA) 50 MG tablet; Take 1 tablet (50 mg) by mouth daily as needed 30 min to 4 hrs before sex. Do not use with nitroglycerin, terazosin or doxazosin.  Dispense: 5 tablet; Refill: 3- discussed side effects.     6. Varicose veins  - patient is not symptomatic -therefore do not need vascular referral at this time  - dicussed wearing compression socks and avoid standing on feet all day    > 40  min spent in direct face to face time with this patient, greater than 50% in counseling and coordination of care discussing bone density, varicose veins, ED, GERD.     MATTHEW Landry [facility]

## 2017-11-10 DIAGNOSIS — E89.0 POSTOPERATIVE HYPOTHYROIDISM: ICD-10-CM

## 2017-11-10 LAB
T4 FREE SERPL-MCNC: 1.13 NG/DL (ref 0.76–1.46)
TSH SERPL DL<=0.005 MIU/L-ACNC: 0.36 MU/L (ref 0.4–4)

## 2017-11-10 PROCEDURE — 84443 ASSAY THYROID STIM HORMONE: CPT | Performed by: NURSE PRACTITIONER

## 2017-11-10 PROCEDURE — 36415 COLL VENOUS BLD VENIPUNCTURE: CPT | Performed by: NURSE PRACTITIONER

## 2017-11-10 PROCEDURE — 84439 ASSAY OF FREE THYROXINE: CPT | Performed by: NURSE PRACTITIONER

## 2017-11-12 PROBLEM — E89.0 POSTSURGICAL HYPOTHYROIDISM: Status: ACTIVE | Noted: 2017-11-12

## 2017-11-17 ENCOUNTER — OFFICE VISIT (OUTPATIENT)
Dept: DERMATOLOGY | Facility: CLINIC | Age: 52
End: 2017-11-17
Payer: COMMERCIAL

## 2017-11-17 VITALS — OXYGEN SATURATION: 98 % | SYSTOLIC BLOOD PRESSURE: 130 MMHG | DIASTOLIC BLOOD PRESSURE: 90 MMHG | HEART RATE: 79 BPM

## 2017-11-17 DIAGNOSIS — L82.1 SEBORRHEIC KERATOSIS: ICD-10-CM

## 2017-11-17 DIAGNOSIS — D18.01 CHERRY ANGIOMA: ICD-10-CM

## 2017-11-17 DIAGNOSIS — D22.9 MULTIPLE BENIGN NEVI: ICD-10-CM

## 2017-11-17 DIAGNOSIS — L57.0 AK (ACTINIC KERATOSIS): Primary | ICD-10-CM

## 2017-11-17 DIAGNOSIS — L81.4 LENTIGO: ICD-10-CM

## 2017-11-17 DIAGNOSIS — L81.2 EPHELIDES: ICD-10-CM

## 2017-11-17 PROCEDURE — 99213 OFFICE O/P EST LOW 20 MIN: CPT | Mod: 25 | Performed by: PHYSICIAN ASSISTANT

## 2017-11-17 PROCEDURE — 17003 DESTRUCT PREMALG LES 2-14: CPT | Performed by: PHYSICIAN ASSISTANT

## 2017-11-17 PROCEDURE — 17000 DESTRUCT PREMALG LESION: CPT | Performed by: PHYSICIAN ASSISTANT

## 2017-11-17 NOTE — LETTER
11/17/2017         RE: Marcelo Hawkins  [address] [address] [place] [id number]        Dear Colleague,    Thank you for referring your patient, Marcelo Hawkins, to the [facility]. Please see a copy of my visit note below.    Marcelo Hawkins is a 52 year old year old male patient here today for skin check.  Patient reports that he has noticed a few scaly areas on face. He denies pain or bleeding.  Patient has no other skin complaints today.  Remainder of the HPI, Meds, PMH, Allergies, FH, and SH was reviewed in chart.    Pertinent Hx:  No personal history of skin cancer.   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, residual hypothyroidism     Other and unspecified hyperlipidemia      Postsurgical hypothyroidism     s/p thryoidectomy        Past Surgical History:   Procedure Laterality Date     COLONOSCOPY N/A 3/17/2016    Procedure: COLONOSCOPY;  Surgeon: Jovanny Fuller MD;  Location: [facility]     ESOPHAGOSCOPY, GASTROSCOPY, DUODENOSCOPY (EGD), COMBINED N/A 5/18/2017    Procedure: COMBINED ESOPHAGOSCOPY, GASTROSCOPY, DUODENOSCOPY (EGD), BIOPSY SINGLE OR MULTIPLE;  Gastroscopy  ;  Surgeon: Jovanny Fuller MD;  Location: [facility]     SURGICAL HISTORY OF -       hernia times three-right inguinal     SURGICAL HISTORY OF -   1989    thyroidectomy        Family History   Problem Relation Age of Onset     Lipids Mother      Hypertension Father      CANCER Father      sweat gland cancer     Other - See Comments Father 77     IPF - ideomatic pulmonary fibrosis     Lipids Brother      Thyroid Disease Brother      Arthritis Maternal Grandfather      HEART DISEASE Paternal Grandmother      Melanoma No family hx of        Social History     Social History     Marital status:      Spouse name: Merline     Number of children: 2     Years of education: N/A     Occupational History      [employer]     Social History Main Topics     Smoking  status: Never Smoker     Smokeless tobacco: Never Used     Alcohol use Yes      Comment: 2-3 drinks a week     Drug use: No     Sexual activity: Yes     Partners: Female     Birth control/ protection: Surgical     Other Topics Concern     Caffeine Concern Yes     1 coffee daily     Exercise Yes     walks & Running-3 x days a week     Seat Belt Yes     Self-Exams Yes     Parent/Sibling W/ Cabg, Mi Or Angioplasty Before 65f 55m? No     Social History Narrative       Outpatient Encounter Prescriptions as of 11/17/2017   Medication Sig Dispense Refill     sildenafil (VIAGRA) 50 MG tablet Take 1 tablet (50 mg) by mouth daily as needed 30 min to 4 hrs before sex. Do not use with nitroglycerin, terazosin or doxazosin. 5 tablet 3     omeprazole (PRILOSEC) 20 MG CR capsule TAKE 1 CAPSULE BY MOUTH  DAILY 90 capsule 2     SYNTHROID 175 MCG tablet Take 1 tablet by mouth  daily 90 tablet 1     simvastatin (ZOCOR) 20 MG tablet Take 1 tablet by mouth at  bedtime 90 tablet 3     loratadine (CLARITIN) 10 MG tablet Take 1 tablet (10 mg) by mouth daily 90 tablet 3     ORDER FOR DME Superfeet inserts 2 Device 0     Multiple Vitamins-Minerals (MULTIVITAMIN OR)        calcium-vitamin D (CALCIUM 600 + D) 600-400 MG-UNIT per tablet Take 1 tablet by mouth 2 times daily. 100 tablet 3     No facility-administered encounter medications on file as of 11/17/2017.              Review Of Systems  Skin: As above  Eyes: negative  Ears/Nose/Throat: negative  Respiratory: No shortness of breath, dyspnea on exertion, cough, or hemoptysis  Cardiovascular: negative  Gastrointestinal: negative  Genitourinary: negative  Musculoskeletal: negative  Neurologic: negative  Psychiatric: negative  Hematologic/Lymphatic/Immunologic: negative  Endocrine: negative      O:   NAD, WDWN, Alert & Oriented, Mood & Affect wnl, Vitals stable   Here today alone   /90  Pulse 79  SpO2 98%   General appearance normal   Vitals stable   Alert, oriented and in no acute  distress      Pink gritty papule on right lateral forehead and left cheek   Brown macules, brown stuck on papules on torso and extremities   Few red papules on torso   Brown papules and macules with regular pigment network and borders on torso and extremities        The remainder of the detailed exam was unremarkable; the following areas were examined:  scalp/hair, conjunctiva/lids, face, neck, lips/teeth, oral mucosa/gingiva, chest, back, abdomen, buttocks, digits/nails, RUE, LUE, RLE, LLE.      Eyes: Conjunctivae/lids:Normal     ENT: Lips    MSK:Normal    Cardiovascular: peripheral edema none    Pulm: Breathing Normal    Neuro/Psych: Orientation:Normal; Mood/Affect:Normal  A/P:  1. Actinic Keratoses on right lateral forehead and left cheek x 2  LN2:  Treated with LN2 for 5s for 1-2 cycles. Warned risks of blistering, pain, pigment change, scarring, and incomplete resolution.  Advised patient to return if lesions do not completely resolve.  Wound care sheet given.  2. Seborrheic keratosis, lentigo, ephelides, Cherry angioma, benign nevi   BENIGN LESIONS DISCUSSED WITH PATIENT:  I discussed the specifics of tumor, prognosis, and genetics of benign lesions.  I explained that treatment of these lesions would be purely cosmetic and not medically neccessary.  I discussed with patient different removal options including excision, cautery and /or laser.      Nature and genetics of benign skin lesions dicussed with patient.  Signs and Symptoms of skin cancer discussed with patient.  ABCDEs of melanoma reviewed with patient.  Patient encouraged to perform monthly skin exams.  UV precautions reviewed with patient.  Skin care regimen reviewed with patient: Eliminate harsh soaps, i.e. Dial, zest, irsih spring; Mild soaps such as Cetaphil or Dove sensitive skin, avoid hot or cold showers, aggressive use of emollients including vanicream, cetaphil or cerave discussed with patient.    Risks of non-melanoma skin cancer discussed  with patient   Return to clinic one year or sooner if needed.       Again, thank you for allowing me to participate in the care of your patient.        Sincerely,        Stefani Clifton PA-C

## 2017-11-17 NOTE — NURSING NOTE
Initial /90  Pulse 79  SpO2 98% Estimated body mass index is 23.87 kg/(m^2) as calculated from the following:    Height as of 11/2/17: 1.829 m (6').    Weight as of 11/2/17: 79.8 kg (176 lb). .    Hillary Street LPN

## 2017-11-17 NOTE — MR AVS SNAPSHOT
After Visit Summary   11/17/2017    Marcelo Hawkins    MRN: [id number]           Patient Information     Date Of Birth          1965        Visit Information        Provider Department      11/17/2017 7:20 AM Stefani Kaye PA-C [facility]        Care Instructions    WOUND CARE INSTRUCTIONS   FOR CRYOSURGERY   This area treated with liquid nitrogen will form a blister. You do not need to bandage the area until after the blister forms and breaks (which may be a few days). When the blister breaks, begin daily dressing changes as follows:   1) Clean and dry the area with tap water using clean Q-tip or sterile gauze pad.   2) Apply Polysporin ointment or Bacitracin ointment over entire wound. Do NOT use Neosporin ointment.   3) Cover the wound with a band-aid or sterile non-stick gauze pad and micropore paper tape.   REPEAT THESE INSTRUCTIONS AT LEAST ONCE A DAY UNTIL THE WOUND HAS COMPLETELY HEALED.   It is an old wives tale that a wound heals better when it is exposed to air and allowed to dry out. The wound will heal faster with a better cosmetic result if it is kept moist with ointment and covered with a bandage.   Do not let the wound dry out.   IMPORTANT INFORMATION ON REVERSE SIDE   Supplies Needed:   *Cotton tipped applicators (Q-tips)   *Polysporin ointment or Bacitracin ointment (NOT NEOSPORIN)   *Band-aids, or non stick gauze pads and micropore paper tape   PATIENT INFORMATION   During the healing process you will notice a number of changes. All wounds develop a small halo of redness surrounding the wound. This means healing is occurring. Severe itching with extensive redness usually indicates sensitivity to the ointment or bandage tape used to dress the wound. You should call our office if this develops.   Swelling and/or discoloration around your surgical site is common, particularly when performed around the eye.   All wounds normally drain. The larger the wound the  more drainage there will be. After 7-10 days, you will notice the wound beginning to shrink and new skin will begin to grow. The wound is healed when you can see skin has formed over the entire area. A healed wound has a healthy, shiny look to the surface and is red to dark pink in color to normalize. Wounds may take approximately 4-6 weeks to heal. Larger wounds may take 6-8 weeks. After the wound is healed you may discontinue dressing changes.   You may experience a sensation of tightness as your wound heals. This is normal and will gradually subside.   Your healed wound may be sensitive to temperature changes. This sensitivity improves with time, but if you re having a lot of discomfort, try to avoid temperature extremes.   Patients frequently experience itching after their wound appears to have healed because of the continue healing under the skin. Plain Vaseline will help relieve the itching.                 Follow-ups after your visit        Who to contact     If you have questions or need follow up information about today's clinic visit or your schedule please contact [facility] directly at [phone].  Normal or non-critical lab and imaging results will be communicated to you by [employer], letter or phone within 4 business days after the clinic has received the results. If you do not hear from us within 7 days, please contact the clinic through [employer] or phone. If you have a critical or abnormal lab result, we will notify you by phone as soon as possible.  Submit refill requests through [email or web address] or call your pharmacy and they will forward the refill request to us. Please allow 3 business days for your refill to be completed.          Additional Information About Your Visit        [email or web address] Information     [email or web address] gives you secure access to your electronic health record. If you see a primary care provider, you can also send messages to your care team and make appointments. If you have questions,  please call your primary care clinic.  If you do not have a primary care provider, please call [phone] and they will assist you.        Care EveryWhere ID     This is your Care EveryWhere ID. This could be used by other organizations to access your [place] medical records  [id number]        Your Vitals Were     Pulse Pulse Oximetry                79 98%           Blood Pressure from Last 3 Encounters:   11/17/17 130/90   11/02/17 135/83   05/31/17 115/83    Weight from Last 3 Encounters:   11/02/17 79.8 kg (176 lb)   08/03/17 87.1 kg (192 lb)   05/31/17 82.7 kg (182 lb 6.4 oz)              Today, you had the following     No orders found for display       Primary Care Provider Office Phone # Fax #    MATTHEW Landry [facility] [phone] [address] [facility] [address]        Equal Access to Services     MARCIAL MONTES DE OCA : Hadii aad ku hadasho Soomaali, waaxda luqadaha, qaybta kaalmada adeegyada, waxkary anguianoin shaka luevano . So [facility] [phone].    ATENCIÓN: Si habla español, tiene a bhatti disposición servicios gratuitos de asistencia lingüística. Llame al [phone].    We comply with applicable federal civil rights laws and [place] laws. We do not discriminate on the basis of race, color, national origin, age, disability, sex, sexual orientation, or gender identity.            Thank you!     Thank you for choosing [facility]  for your care. Our goal is always to provide you with excellent care. Hearing back from our patients is one way we can continue to improve our services. Please take a few minutes to complete the written survey that you may receive in the mail after your visit with us. Thank you!             Your Updated Medication List - Protect others around you: Learn how to safely use, store and throw away your medicines at www.disposemymeds.org.          This list is accurate as of: 11/17/17  7:48 AM.  Always use your most recent med list.                    Brand Name Dispense Instructions for use Diagnosis    calcium 600 + D 600-400 MG-UNIT per tablet   Generic drug:  calcium-vitamin D     100 tablet    Take 1 tablet by mouth 2 times daily.        loratadine 10 MG tablet    CLARITIN    90 tablet    Take 1 tablet (10 mg) by mouth daily    H/O seasonal allergies       MULTIVITAMIN PO           omeprazole 20 MG CR capsule    priLOSEC    90 capsule    TAKE 1 CAPSULE BY MOUTH  DAILY    Gastroesophageal reflux disease without esophagitis       order for DME     2 Device    Superfeet inserts    Left foot pain, Neuroma, Faust's, left       sildenafil 50 MG tablet    VIAGRA    5 tablet    Take 1 tablet (50 mg) by mouth daily as needed 30 min to 4 hrs before sex. Do not use with nitroglycerin, terazosin or doxazosin.    Other male erectile dysfunction       simvastatin 20 MG tablet    ZOCOR    90 tablet    Take 1 tablet by mouth at  bedtime    Routine general medical examination at a health care facility, Hyperlipidemia LDL goal <130       SYNTHROID 175 MCG tablet   Generic drug:  levothyroxine     90 tablet    Take 1 tablet by mouth  daily    Postoperative hypothyroidism

## 2017-11-20 ENCOUNTER — MYC MEDICAL ADVICE (OUTPATIENT)
Dept: FAMILY MEDICINE | Facility: CLINIC | Age: 52
End: 2017-11-20

## 2017-11-20 NOTE — PROGRESS NOTES
Marcelo Hawkins is a 52 year old year old male patient here today for skin check.  Patient reports that he has noticed a few scaly areas on face. He denies pain or bleeding.  Patient has no other skin complaints today.  Remainder of the HPI, Meds, PMH, Allergies, FH, and SH was reviewed in chart.    Pertinent Hx:  No personal history of skin cancer.   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, residual hypothyroidism     Other and unspecified hyperlipidemia      Postsurgical hypothyroidism     s/p thryoidectomy        Past Surgical History:   Procedure Laterality Date     COLONOSCOPY N/A 3/17/2016    Procedure: COLONOSCOPY;  Surgeon: Jovanny Fuller MD;  Location: [facility]     ESOPHAGOSCOPY, GASTROSCOPY, DUODENOSCOPY (EGD), COMBINED N/A 5/18/2017    Procedure: COMBINED ESOPHAGOSCOPY, GASTROSCOPY, DUODENOSCOPY (EGD), BIOPSY SINGLE OR MULTIPLE;  Gastroscopy  ;  Surgeon: Jovanny Fuller MD;  Location: [facility]     SURGICAL HISTORY OF -       hernia times three-right inguinal     SURGICAL HISTORY OF -   1989    thyroidectomy        Family History   Problem Relation Age of Onset     Lipids Mother      Hypertension Father      CANCER Father      sweat gland cancer     Other - See Comments Father 77     IPF - ideomatic pulmonary fibrosis     Lipids Brother      Thyroid Disease Brother      Arthritis Maternal Grandfather      HEART DISEASE Paternal Grandmother      Melanoma No family hx of        Social History     Social History     Marital status:      Spouse name: Merline     Number of children: 2     Years of education: N/A     Occupational History      [employer]     Social History Main Topics     Smoking status: Never Smoker     Smokeless tobacco: Never Used     Alcohol use Yes      Comment: 2-3 drinks a week     Drug use: No     Sexual activity: Yes     Partners: Female     Birth control/ protection: Surgical     Other Topics Concern      Caffeine Concern Yes     1 coffee daily     Exercise Yes     walks & Running-3 x days a week     Seat Belt Yes     Self-Exams Yes     Parent/Sibling W/ Cabg, Mi Or Angioplasty Before 65f 55m? No     Social History Narrative       Outpatient Encounter Prescriptions as of 11/17/2017   Medication Sig Dispense Refill     sildenafil (VIAGRA) 50 MG tablet Take 1 tablet (50 mg) by mouth daily as needed 30 min to 4 hrs before sex. Do not use with nitroglycerin, terazosin or doxazosin. 5 tablet 3     omeprazole (PRILOSEC) 20 MG CR capsule TAKE 1 CAPSULE BY MOUTH  DAILY 90 capsule 2     SYNTHROID 175 MCG tablet Take 1 tablet by mouth  daily 90 tablet 1     simvastatin (ZOCOR) 20 MG tablet Take 1 tablet by mouth at  bedtime 90 tablet 3     loratadine (CLARITIN) 10 MG tablet Take 1 tablet (10 mg) by mouth daily 90 tablet 3     ORDER FOR DME Superfeet inserts 2 Device 0     Multiple Vitamins-Minerals (MULTIVITAMIN OR)        calcium-vitamin D (CALCIUM 600 + D) 600-400 MG-UNIT per tablet Take 1 tablet by mouth 2 times daily. 100 tablet 3     No facility-administered encounter medications on file as of 11/17/2017.              Review Of Systems  Skin: As above  Eyes: negative  Ears/Nose/Throat: negative  Respiratory: No shortness of breath, dyspnea on exertion, cough, or hemoptysis  Cardiovascular: negative  Gastrointestinal: negative  Genitourinary: negative  Musculoskeletal: negative  Neurologic: negative  Psychiatric: negative  Hematologic/Lymphatic/Immunologic: negative  Endocrine: negative      O:   NAD, WDWN, Alert & Oriented, Mood & Affect wnl, Vitals stable   Here today alone   /90  Pulse 79  SpO2 98%   General appearance normal   Vitals stable   Alert, oriented and in no acute distress      Pink gritty papule on right lateral forehead and left cheek   Brown macules, brown stuck on papules on torso and extremities   Few red papules on torso   Brown papules and macules with regular pigment network and borders on  torso and extremities        The remainder of the detailed exam was unremarkable; the following areas were examined:  scalp/hair, conjunctiva/lids, face, neck, lips/teeth, oral mucosa/gingiva, chest, back, abdomen, buttocks, digits/nails, RUE, LUE, RLE, LLE.      Eyes: Conjunctivae/lids:Normal     ENT: Lips    MSK:Normal    Cardiovascular: peripheral edema none    Pulm: Breathing Normal    Neuro/Psych: Orientation:Normal; Mood/Affect:Normal  A/P:  1. Actinic Keratoses on right lateral forehead and left cheek x 2  LN2:  Treated with LN2 for 5s for 1-2 cycles. Warned risks of blistering, pain, pigment change, scarring, and incomplete resolution.  Advised patient to return if lesions do not completely resolve.  Wound care sheet given.  2. Seborrheic keratosis, lentigo, ephelides, Cherry angioma, benign nevi   BENIGN LESIONS DISCUSSED WITH PATIENT:  I discussed the specifics of tumor, prognosis, and genetics of benign lesions.  I explained that treatment of these lesions would be purely cosmetic and not medically neccessary.  I discussed with patient different removal options including excision, cautery and /or laser.      Nature and genetics of benign skin lesions dicussed with patient.  Signs and Symptoms of skin cancer discussed with patient.  ABCDEs of melanoma reviewed with patient.  Patient encouraged to perform monthly skin exams.  UV precautions reviewed with patient.  Skin care regimen reviewed with patient: Eliminate harsh soaps, i.e. Dial, zest, irsih spring; Mild soaps such as Cetaphil or Dove sensitive skin, avoid hot or cold showers, aggressive use of emollients including vanicream, cetaphil or cerave discussed with patient.    Risks of non-melanoma skin cancer discussed with patient   Return to clinic one year or sooner if needed.

## 2017-11-20 NOTE — TELEPHONE ENCOUNTER
YES- overall the Z scores are about the same compared to previous study. No need to start any medications at this time.

## 2017-11-21 ENCOUNTER — MYC MEDICAL ADVICE (OUTPATIENT)
Dept: FAMILY MEDICINE | Facility: CLINIC | Age: 52
End: 2017-11-21

## 2017-12-07 ENCOUNTER — MYC MEDICAL ADVICE (OUTPATIENT)
Dept: FAMILY MEDICINE | Facility: CLINIC | Age: 52
End: 2017-12-07

## 2017-12-07 DIAGNOSIS — N52.8 OTHER MALE ERECTILE DYSFUNCTION: Primary | ICD-10-CM

## 2017-12-11 RX ORDER — SILDENAFIL CITRATE 20 MG/1
20 TABLET ORAL DAILY PRN
Qty: 30 TABLET | Refills: 3 | Status: SHIPPED | OUTPATIENT
Start: 2017-12-11 | End: 2018-03-05

## 2017-12-15 ENCOUNTER — OFFICE VISIT (OUTPATIENT)
Dept: DERMATOLOGY | Facility: CLINIC | Age: 52
End: 2017-12-15
Payer: COMMERCIAL

## 2017-12-15 VITALS — OXYGEN SATURATION: 98 % | HEART RATE: 51 BPM | DIASTOLIC BLOOD PRESSURE: 78 MMHG | SYSTOLIC BLOOD PRESSURE: 120 MMHG

## 2017-12-15 DIAGNOSIS — L81.2 EPHELIDES: ICD-10-CM

## 2017-12-15 DIAGNOSIS — L57.0 AK (ACTINIC KERATOSIS): ICD-10-CM

## 2017-12-15 DIAGNOSIS — E89.0 POSTSURGICAL HYPOTHYROIDISM: ICD-10-CM

## 2017-12-15 DIAGNOSIS — D18.00 ANGIOMA: ICD-10-CM

## 2017-12-15 DIAGNOSIS — L81.4 LENTIGO: Primary | ICD-10-CM

## 2017-12-15 LAB — TSH SERPL DL<=0.005 MIU/L-ACNC: 0.62 MU/L (ref 0.4–4)

## 2017-12-15 PROCEDURE — 17000 DESTRUCT PREMALG LESION: CPT | Performed by: PHYSICIAN ASSISTANT

## 2017-12-15 PROCEDURE — 99213 OFFICE O/P EST LOW 20 MIN: CPT | Mod: 25 | Performed by: PHYSICIAN ASSISTANT

## 2017-12-15 PROCEDURE — 84443 ASSAY THYROID STIM HORMONE: CPT | Performed by: NURSE PRACTITIONER

## 2017-12-15 PROCEDURE — 36415 COLL VENOUS BLD VENIPUNCTURE: CPT | Performed by: NURSE PRACTITIONER

## 2017-12-15 NOTE — LETTER
12/15/2017         RE: Marcelo Hawkins  [address] [place] [id number]        Dear Colleague,    Thank you for referring your patient, Marcelo Hawkins, to the [facility]. Please see a copy of my visit note below.    Marcelo Hawkins is a 52 year old year old male patient here today for spot on neck and rough area on face. Patient reports that he had a sore on the back but believes it has healed. Patient has no other skin complaints today.  Remainder of the HPI, Meds, PMH, Allergies, FH, and SH was reviewed in chart.    Pertinent Hx:  History of AKs   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, residual hypothyroidism     Other and unspecified hyperlipidemia      Postsurgical hypothyroidism     s/p thryoidectomy        Past Surgical History:   Procedure Laterality Date     COLONOSCOPY N/A 3/17/2016    Procedure: COLONOSCOPY;  Surgeon: Jovanny Fuller MD;  Location: [facility]     ESOPHAGOSCOPY, GASTROSCOPY, DUODENOSCOPY (EGD), COMBINED N/A 5/18/2017    Procedure: COMBINED ESOPHAGOSCOPY, GASTROSCOPY, DUODENOSCOPY (EGD), BIOPSY SINGLE OR MULTIPLE;  Gastroscopy  ;  Surgeon: Jovanny Fuller MD;  Location: [facility]     SURGICAL HISTORY OF -       hernia times three-right inguinal     SURGICAL HISTORY OF -   1989    thyroidectomy        Family History   Problem Relation Age of Onset     Lipids Mother      Hypertension Father      CANCER Father      sweat gland cancer     Other - See Comments Father 77     IPF - ideomatic pulmonary fibrosis     Lipids Brother      Thyroid Disease Brother      Arthritis Maternal Grandfather      HEART DISEASE Paternal Grandmother      Melanoma No family hx of        Social History     Social History     Marital status:      Spouse name: Merline     Number of children: 2     Years of education: N/A     Occupational History      [employer]     Social History Main Topics     Smoking status: Never  Smoker     Smokeless tobacco: Never Used     Alcohol use Yes      Comment: 2-3 drinks a week     Drug use: No     Sexual activity: Yes     Partners: Female     Birth control/ protection: Surgical     Other Topics Concern     Caffeine Concern Yes     1 coffee daily     Exercise Yes     walks & Running-3 x days a week     Seat Belt Yes     Self-Exams Yes     Parent/Sibling W/ Cabg, Mi Or Angioplasty Before 65f 55m? No     Social History Narrative       Outpatient Encounter Prescriptions as of 12/15/2017   Medication Sig Dispense Refill     sildenafil (REVATIO) 20 MG tablet Take 1 tablet (20 mg) by mouth daily as needed 30 min to 4 hrs before sex. Do not use with nitroglycerin, terazosin or doxazosin. 30 tablet 3     omeprazole (PRILOSEC) 20 MG CR capsule TAKE 1 CAPSULE BY MOUTH  DAILY 90 capsule 2     SYNTHROID 175 MCG tablet Take 1 tablet by mouth  daily 90 tablet 1     simvastatin (ZOCOR) 20 MG tablet Take 1 tablet by mouth at  bedtime 90 tablet 3     loratadine (CLARITIN) 10 MG tablet Take 1 tablet (10 mg) by mouth daily 90 tablet 3     ORDER FOR DME Superfeet inserts 2 Device 0     Multiple Vitamins-Minerals (MULTIVITAMIN OR)        calcium-vitamin D (CALCIUM 600 + D) 600-400 MG-UNIT per tablet Take 1 tablet by mouth 2 times daily. 100 tablet 3     No facility-administered encounter medications on file as of 12/15/2017.              Review Of Systems  Skin: As above  Eyes: negative  Ears/Nose/Throat: negative  Respiratory: No shortness of breath, dyspnea on exertion, cough, or hemoptysis  Cardiovascular: negative  Gastrointestinal: negative  Genitourinary: negative  Musculoskeletal: negative  Neurologic: negative  Psychiatric: negative  Hematologic/Lymphatic/Immunologic: negative  Endocrine: negative      O:   NAD, WDWN, Alert & Oriented, Mood & Affect wnl, Vitals stable   Here today alone   /78  Pulse 51  SpO2 98%   General appearance normal   Vitals stable   Alert, oriented and in no acute  distress     Brown macules and red papules on neck and upper back  Pink gritty papule on left medial canthus       Eyes: Conjunctivae/lids:Normal     ENT: Lips    MSK:Normal    Pulm: Breathing Normal     Neuro/Psych: Orientation:Normal; Mood/Affect:Normal    A/P:  1. Actinic Keratosis on left medial canthus  LN2:  Treated with LN2 for 5s for 1-2 cycles. Warned risks of blistering, pain, pigment change, scarring, and incomplete resolution.  Advised patient to return if lesions do not completely resolve.  Wound care sheet given.  2. Lentigo, ephelides angioma   BENIGN LESIONS DISCUSSED WITH PATIENT:  I discussed the specifics of tumor, prognosis, and genetics of benign lesions.  I explained that treatment of these lesions would be purely cosmetic and not medically neccessary.  I discussed with patient different removal options including excision, cautery and /or laser.      Nature and genetics of benign skin lesions dicussed with patient.  Signs and Symptoms of skin cancer discussed with patient.  ABCDEs of melanoma reviewed with patient.  Patient encouraged to perform monthly skin exams.  UV precautions reviewed with patient.  Risks of non-melanoma skin cancer discussed with patient   Return to clinic one year or sooner if needed.     Again, thank you for allowing me to participate in the care of your patient.        Sincerely,        Stefani Clifton PA-C

## 2017-12-15 NOTE — PROGRESS NOTES
Marcelo Hawkins is a 52 year old year old male patient here today for spot on neck and rough area on face. Patient reports that he had a sore on the back but believes it has healed. Patient has no other skin complaints today.  Remainder of the HPI, Meds, PMH, Allergies, FH, and SH was reviewed in chart.    Pertinent Hx:  History of AKs   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, residual hypothyroidism     Other and unspecified hyperlipidemia      Postsurgical hypothyroidism     s/p thryoidectomy        Past Surgical History:   Procedure Laterality Date     COLONOSCOPY N/A 3/17/2016    Procedure: COLONOSCOPY;  Surgeon: Jovanny Fuller MD;  Location: [facility]     ESOPHAGOSCOPY, GASTROSCOPY, DUODENOSCOPY (EGD), COMBINED N/A 5/18/2017    Procedure: COMBINED ESOPHAGOSCOPY, GASTROSCOPY, DUODENOSCOPY (EGD), BIOPSY SINGLE OR MULTIPLE;  Gastroscopy  ;  Surgeon: Jovanny Fuller MD;  Location: [facility]     SURGICAL HISTORY OF -       hernia times three-right inguinal     SURGICAL HISTORY OF -   1989    thyroidectomy        Family History   Problem Relation Age of Onset     Lipids Mother      Hypertension Father      CANCER Father      sweat gland cancer     Other - See Comments Father 77     IPF - ideomatic pulmonary fibrosis     Lipids Brother      Thyroid Disease Brother      Arthritis Maternal Grandfather      HEART DISEASE Paternal Grandmother      Melanoma No family hx of        Social History     Social History     Marital status:      Spouse name: Merline     Number of children: 2     Years of education: N/A     Occupational History      [employer]     Social History Main Topics     Smoking status: Never Smoker     Smokeless tobacco: Never Used     Alcohol use Yes      Comment: 2-3 drinks a week     Drug use: No     Sexual activity: Yes     Partners: Female     Birth control/ protection: Surgical     Other Topics Concern     Caffeine Concern Yes      1 coffee daily     Exercise Yes     walks & Running-3 x days a week     Seat Belt Yes     Self-Exams Yes     Parent/Sibling W/ Cabg, Mi Or Angioplasty Before 65f 55m? No     Social History Narrative       Outpatient Encounter Prescriptions as of 12/15/2017   Medication Sig Dispense Refill     sildenafil (REVATIO) 20 MG tablet Take 1 tablet (20 mg) by mouth daily as needed 30 min to 4 hrs before sex. Do not use with nitroglycerin, terazosin or doxazosin. 30 tablet 3     omeprazole (PRILOSEC) 20 MG CR capsule TAKE 1 CAPSULE BY MOUTH  DAILY 90 capsule 2     SYNTHROID 175 MCG tablet Take 1 tablet by mouth  daily 90 tablet 1     simvastatin (ZOCOR) 20 MG tablet Take 1 tablet by mouth at  bedtime 90 tablet 3     loratadine (CLARITIN) 10 MG tablet Take 1 tablet (10 mg) by mouth daily 90 tablet 3     ORDER FOR DME Superfeet inserts 2 Device 0     Multiple Vitamins-Minerals (MULTIVITAMIN OR)        calcium-vitamin D (CALCIUM 600 + D) 600-400 MG-UNIT per tablet Take 1 tablet by mouth 2 times daily. 100 tablet 3     No facility-administered encounter medications on file as of 12/15/2017.              Review Of Systems  Skin: As above  Eyes: negative  Ears/Nose/Throat: negative  Respiratory: No shortness of breath, dyspnea on exertion, cough, or hemoptysis  Cardiovascular: negative  Gastrointestinal: negative  Genitourinary: negative  Musculoskeletal: negative  Neurologic: negative  Psychiatric: negative  Hematologic/Lymphatic/Immunologic: negative  Endocrine: negative      O:   NAD, WDWN, Alert & Oriented, Mood & Affect wnl, Vitals stable   Here today alone   /78  Pulse 51  SpO2 98%   General appearance normal   Vitals stable   Alert, oriented and in no acute distress     Brown macules and red papules on neck and upper back  Pink gritty papule on left medial canthus       Eyes: Conjunctivae/lids:Normal     ENT: Lips    MSK:Normal    Pulm: Breathing Normal     Neuro/Psych: Orientation:Normal;  Mood/Affect:Normal    A/P:  1. Actinic Keratosis on left medial canthus  LN2:  Treated with LN2 for 5s for 1-2 cycles. Warned risks of blistering, pain, pigment change, scarring, and incomplete resolution.  Advised patient to return if lesions do not completely resolve.  Wound care sheet given.  2. Lentigo, ephelides angioma   BENIGN LESIONS DISCUSSED WITH PATIENT:  I discussed the specifics of tumor, prognosis, and genetics of benign lesions.  I explained that treatment of these lesions would be purely cosmetic and not medically neccessary.  I discussed with patient different removal options including excision, cautery and /or laser.      Nature and genetics of benign skin lesions dicussed with patient.  Signs and Symptoms of skin cancer discussed with patient.  ABCDEs of melanoma reviewed with patient.  Patient encouraged to perform monthly skin exams.  UV precautions reviewed with patient.  Risks of non-melanoma skin cancer discussed with patient   Return to clinic one year or sooner if needed.

## 2017-12-15 NOTE — NURSING NOTE
Initial /78  Pulse 51  SpO2 98% Estimated body mass index is 23.87 kg/(m^2) as calculated from the following:    Height as of 11/2/17: 1.829 m (6').    Weight as of 11/2/17: 79.8 kg (176 lb). .

## 2017-12-15 NOTE — MR AVS SNAPSHOT
After Visit Summary   12/15/2017    Marcelo Hawkins    MRN: [id number]           Patient Information     Date Of Birth          1965        Visit Information        Provider Department      12/15/2017 8:00 AM Stefani Kaye PA-C [facility]        Care Instructions    WOUND CARE INSTRUCTIONS   FOR CRYOSURGERY   This area treated with liquid nitrogen will form a blister. You do not need to bandage the area until after the blister forms and breaks (which may be a few days). When the blister breaks, begin daily dressing changes as follows:   1) Clean and dry the area with tap water using clean Q-tip or sterile gauze pad.   2) Apply Polysporin ointment or Bacitracin ointment over entire wound. Do NOT use Neosporin ointment.   3) Cover the wound with a band-aid or sterile non-stick gauze pad and micropore paper tape.   REPEAT THESE INSTRUCTIONS AT LEAST ONCE A DAY UNTIL THE WOUND HAS COMPLETELY HEALED.   It is an old wives tale that a wound heals better when it is exposed to air and allowed to dry out. The wound will heal faster with a better cosmetic result if it is kept moist with ointment and covered with a bandage.   Do not let the wound dry out.   IMPORTANT INFORMATION ON REVERSE SIDE   Supplies Needed:   *Cotton tipped applicators (Q-tips)   *Polysporin ointment or Bacitracin ointment (NOT NEOSPORIN)   *Band-aids, or non stick gauze pads and micropore paper tape   PATIENT INFORMATION   During the healing process you will notice a number of changes. All wounds develop a small halo of redness surrounding the wound. This means healing is occurring. Severe itching with extensive redness usually indicates sensitivity to the ointment or bandage tape used to dress the wound. You should call our office if this develops.   Swelling and/or discoloration around your surgical site is common, particularly when performed around the eye.   All wounds normally drain. The larger the wound the  more drainage there will be. After 7-10 days, you will notice the wound beginning to shrink and new skin will begin to grow. The wound is healed when you can see skin has formed over the entire area. A healed wound has a healthy, shiny look to the surface and is red to dark pink in color to normalize. Wounds may take approximately 4-6 weeks to heal. Larger wounds may take 6-8 weeks. After the wound is healed you may discontinue dressing changes.   You may experience a sensation of tightness as your wound heals. This is normal and will gradually subside.   Your healed wound may be sensitive to temperature changes. This sensitivity improves with time, but if you re having a lot of discomfort, try to avoid temperature extremes.   Patients frequently experience itching after their wound appears to have healed because of the continue healing under the skin. Plain Vaseline will help relieve the itching.                 Follow-ups after your visit        Your next 10 appointments already scheduled     Dec 15, 2017  8:35 AM [facility]   LAB with [facility] ([facility])    [address] [facility] [address] [phone]           Please do not eat 10-12 hours before your appointment if you are coming in fasting for labs on lipids, cholesterol, or glucose (sugar). This does not apply to pregnant women. Water, hot tea and black coffee (with nothing added) are okay. Do not drink other fluids, diet soda or chew gum.              Who to contact     If you have questions or need follow up information about today's clinic visit or your schedule please contact [facility] directly at [phone].  Normal or non-critical lab and imaging results will be communicated to you by MyChart, letter or phone within 4 business days after the clinic has received the results. If you do not hear from us within 7 days, please contact the clinic through MyChart or phone. If you have a  critical or abnormal lab result, we will notify you by phone as soon as possible.  Submit refill requests through [email or web address] or call your pharmacy and they will forward the refill request to us. Please allow 3 business days for your refill to be completed.          Additional Information About Your Visit        [employer] Information     [email or web address] gives you secure access to your electronic health record. If you see a primary care provider, you can also send messages to your care team and make appointments. If you have questions, please call your primary care clinic.  If you do not have a primary care provider, please call [phone] and they will assist you.        Care EveryWhere ID     This is your Care EveryWhere ID. This could be used by other organizations to access your [facility] medical records  [id number]        Your Vitals Were     Pulse Pulse Oximetry                51 98%           Blood Pressure from Last 3 Encounters:   12/15/17 120/78   11/17/17 130/90   11/02/17 135/83    Weight from Last 3 Encounters:   11/02/17 79.8 kg (176 lb)   08/03/17 87.1 kg (192 lb)   05/31/17 82.7 kg (182 lb 6.4 oz)              Today, you had the following     No orders found for display       Primary Care Provider Office Phone # Fax #    MATTHEW [facility] [phone] [address] [facility] [address]        Equal Access to Services     MARCIAL MONTES DE OCA : Hadii aad ku hadasho Soomaali, waaxda luqadaha, qaybta kaalmada adesaschayada, zackary brooks. So [facility] [phone].    ATENCIÓN: Si habla español, tiene a bhatti disposición servicios gratuitos de asistencia lingüística. Christa al [phone].    We comply with applicable federal civil rights laws and [place] laws. We do not discriminate on the basis of race, color, national origin, age, disability, sex, sexual orientation, or gender identity.            Thank you!     Thank you for choosing [facility]  for your  care. Our goal is always to provide you with excellent care. Hearing back from our patients is one way we can continue to improve our services. Please take a few minutes to complete the written survey that you may receive in the mail after your visit with us. Thank you!             Your Updated Medication List - Protect others around you: Learn how to safely use, store and throw away your medicines at www.disposemymeds.org.          This list is accurate as of: 12/15/17  8:28 AM.  Always use your most recent med list.                   Brand Name Dispense Instructions for use Diagnosis    calcium 600 + D 600-400 MG-UNIT per tablet   Generic drug:  calcium-vitamin D     100 tablet    Take 1 tablet by mouth 2 times daily.        loratadine 10 MG tablet    CLARITIN    90 tablet    Take 1 tablet (10 mg) by mouth daily    H/O seasonal allergies       MULTIVITAMIN PO           omeprazole 20 MG CR capsule    priLOSEC    90 capsule    TAKE 1 CAPSULE BY MOUTH  DAILY    Gastroesophageal reflux disease without esophagitis       order for DME     2 Device    Superfeet inserts    Left foot pain, Neuroma, Faust's, left       sildenafil 20 MG tablet    REVATIO    30 tablet    Take 1 tablet (20 mg) by mouth daily as needed 30 min to 4 hrs before sex. Do not use with nitroglycerin, terazosin or doxazosin.    Other male erectile dysfunction       simvastatin 20 MG tablet    ZOCOR    90 tablet    Take 1 tablet by mouth at  bedtime    Routine general medical examination at a health care facility, Hyperlipidemia LDL goal <130       SYNTHROID 175 MCG tablet   Generic drug:  levothyroxine     90 tablet    Take 1 tablet by mouth  daily    Postoperative hypothyroidism

## 2017-12-27 ENCOUNTER — MYC MEDICAL ADVICE (OUTPATIENT)
Dept: FAMILY MEDICINE | Facility: CLINIC | Age: 52
End: 2017-12-27

## 2017-12-27 DIAGNOSIS — E89.0 POSTOPERATIVE HYPOTHYROIDISM: Primary | ICD-10-CM

## 2017-12-27 RX ORDER — LEVOTHYROXINE SODIUM 50 UG/1
50 TABLET ORAL
Qty: 90 TABLET | Refills: 3 | Status: SHIPPED | OUTPATIENT
Start: 2017-12-27 | End: 2018-03-19

## 2017-12-27 RX ORDER — LEVOTHYROXINE SODIUM 112 UG/1
112 TABLET ORAL
Qty: 90 TABLET | Refills: 3 | Status: SHIPPED | OUTPATIENT
Start: 2017-12-27 | End: 2018-03-19

## 2017-12-27 NOTE — TELEPHONE ENCOUNTER
Carmen, please see his mychart note/ request for 112 and 50 mcg synthroid, brand name for 5 times per week.     Myesha Tinoco RNC

## 2018-03-05 DIAGNOSIS — N52.8 OTHER MALE ERECTILE DYSFUNCTION: ICD-10-CM

## 2018-03-05 RX ORDER — SILDENAFIL CITRATE 20 MG/1
20 TABLET ORAL DAILY PRN
Qty: 30 TABLET | Refills: 3 | Status: SHIPPED | OUTPATIENT
Start: 2018-03-05 | End: 2018-08-23

## 2018-03-05 NOTE — TELEPHONE ENCOUNTER
"Requested Prescriptions   Pending Prescriptions Disp Refills     sildenafil (REVATIO) 20 MG tablet  Last Written Prescription Date:  12/11/17  Last Fill Quantity: 30,  # refills: 3   Last office visit: 11/2/2017 with prescribing provider:  11/01/17   Future Office Visit:     30 tablet 3     Sig: Take 1 tablet (20 mg) by mouth daily as needed 30 min to 4 hrs before sex. Do not use with nitroglycerin, terazosin or doxazosin.    Erectile Dysfuction Protocol Passed    3/5/2018  3:09 PM       Passed - Absence of nitrates on medication list       Passed - Absence of Alpha Blockers on Med list       Passed - Recent (12 mo) or future (30 days) visit within the authorizing provider's specialty    Patient had office visit in the last year or has a visit in the next 30 days with authorizing provider.  See \"Patient Info\" tab in inbasket, or \"Choose Columns\" in Meds & Orders section of the refill encounter.        Passed - Patient is age 18 or older          "

## 2018-03-13 ENCOUNTER — TELEPHONE (OUTPATIENT)
Dept: FAMILY MEDICINE | Facility: CLINIC | Age: 53
End: 2018-03-13

## 2018-03-13 DIAGNOSIS — E89.0 POSTSURGICAL HYPOTHYROIDISM: Primary | ICD-10-CM

## 2018-03-13 NOTE — TELEPHONE ENCOUNTER
Reason for Call: Request for an order or referral:    Order or referral being requested: TSH  T4     Date needed: as soon as possible    Has the patient been seen by the PCP for this problem? YES    Additional comments: Patient is due for labs due to med change in December. He called to make appt and they have not been ordered. Please put order in.    Phone number Patient can be reached at:  Cell number on file:    Telephone Information:   Mobile [phone]       Best Time:  any    Can we leave a detailed message on this number?      Call taken on 3/13/2018 at 8:29 AM by Cindy Medina

## 2018-03-16 DIAGNOSIS — E89.0 POSTSURGICAL HYPOTHYROIDISM: ICD-10-CM

## 2018-03-16 LAB — TSH SERPL DL<=0.005 MIU/L-ACNC: 2.74 MU/L (ref 0.4–4)

## 2018-03-16 PROCEDURE — 36415 COLL VENOUS BLD VENIPUNCTURE: CPT | Performed by: NURSE PRACTITIONER

## 2018-03-16 PROCEDURE — 84443 ASSAY THYROID STIM HORMONE: CPT | Performed by: NURSE PRACTITIONER

## 2018-03-19 ENCOUNTER — MYC MEDICAL ADVICE (OUTPATIENT)
Dept: FAMILY MEDICINE | Facility: CLINIC | Age: 53
End: 2018-03-19

## 2018-03-19 DIAGNOSIS — E89.0 POSTSURGICAL HYPOTHYROIDISM: Primary | ICD-10-CM

## 2018-03-19 DIAGNOSIS — E89.0 POSTOPERATIVE HYPOTHYROIDISM: ICD-10-CM

## 2018-03-19 RX ORDER — LEVOTHYROXINE SODIUM 112 UG/1
112 TABLET ORAL
Qty: 90 TABLET | Refills: 3
Start: 2018-03-19 | End: 2018-08-23

## 2018-03-19 RX ORDER — LEVOTHYROXINE SODIUM 175 MCG
TABLET ORAL
Qty: 24 TABLET | Refills: 5
Start: 2018-03-19 | End: 2018-08-23

## 2018-03-19 RX ORDER — LEVOTHYROXINE SODIUM 50 UG/1
50 TABLET ORAL
Qty: 90 TABLET | Refills: 3
Start: 2018-03-19 | End: 2018-08-23

## 2018-03-19 NOTE — TELEPHONE ENCOUNTER
Please let Marcelo know that I am support of this plan- please update his med list- typically recheck TSH In 12 weeks unless the patient wants it sooner- please order TSH

## 2018-03-19 NOTE — TELEPHONE ENCOUNTER
Please see mychart regarding his levothyroxine med.  You ok with this?    Recheck TSH in 8 weeks?  Ready.    Routing to provider.  India ONEILL RN

## 2018-04-19 ENCOUNTER — OFFICE VISIT (OUTPATIENT)
Dept: FAMILY MEDICINE | Facility: CLINIC | Age: 53
End: 2018-04-19
Payer: COMMERCIAL

## 2018-04-19 VITALS
BODY MASS INDEX: 22.92 KG/M2 | WEIGHT: 169 LBS | OXYGEN SATURATION: 98 % | SYSTOLIC BLOOD PRESSURE: 132 MMHG | DIASTOLIC BLOOD PRESSURE: 84 MMHG | TEMPERATURE: 97.6 F | HEART RATE: 109 BPM

## 2018-04-19 DIAGNOSIS — J20.9 ACUTE BRONCHITIS, UNSPECIFIED ORGANISM: Primary | ICD-10-CM

## 2018-04-19 PROCEDURE — 99213 OFFICE O/P EST LOW 20 MIN: CPT | Performed by: NURSE PRACTITIONER

## 2018-04-19 RX ORDER — AZITHROMYCIN 250 MG/1
TABLET, FILM COATED ORAL
Qty: 6 TABLET | Refills: 0 | Status: SHIPPED | OUTPATIENT
Start: 2018-04-19 | End: 2018-08-23

## 2018-04-19 NOTE — NURSING NOTE
Chief Complaint   Patient presents with     Cough     fever and coughing since the weekend, hurts in lungs when breathing in       Initial /84  Pulse 109  Temp 97.6  F (36.4  C) (Tympanic)  Wt 169 lb (76.7 kg)  SpO2 98%  BMI 22.92 kg/m2 Estimated body mass index is 22.92 kg/(m^2) as calculated from the following:    Height as of 11/2/17: 6' (1.829 m).    Weight as of this encounter: 169 lb (76.7 kg).  Medication Reconciliation: complete    Marsha Peñaloza, CMA

## 2018-04-19 NOTE — PROGRESS NOTES
SUBJECTIVE:   Marcelo Hawkins is a 53 year old male who presents to clinic today for the following health issues:      ENT Symptoms             Symptoms: cc Present Absent Comment   Fever/Chills  x     Fatigue   x    Muscle Aches   x    Eye Irritation   x    Sneezing   x    Nasal Darshan/Drg  x  Runny nose    Sinus Pressure/Pain  x  Slightly    Loss of smell   x    Dental pain   x    Sore Throat   x    Swollen Glands   x    Ear Pain/Fullness   x    Cough  x  Coughing up phlegm and states lungs will hurt when coughing   Wheeze   x    Chest Pain  x  A little when coughing   Shortness of breath   x    Rash   x    Other   x      Symptom duration:  since the weekend   Symptom severity:     Treatments tried:  ibuprofen, tylenol and otc cough meds   Contacts:  none       Problem list and histories reviewed & adjusted, as indicated.  Additional history: as documented    Patient Active Problem List   Diagnosis     Papillary carcinoma of thyroid (H)     Hyperlipidemia LDL goal <130     Osteopenia     Internal hemorrhoids     H/O seasonal allergies     Left foot pain     Anxiety     Gastroesophageal reflux disease without esophagitis     Postsurgical hypothyroidism     Past Surgical History:   Procedure Laterality Date     COLONOSCOPY N/A 3/17/2016    Procedure: COLONOSCOPY;  Surgeon: Jovanny Fuller MD;  Location: [facility]     ESOPHAGOSCOPY, GASTROSCOPY, DUODENOSCOPY (EGD), COMBINED N/A 5/18/2017    Procedure: COMBINED ESOPHAGOSCOPY, GASTROSCOPY, DUODENOSCOPY (EGD), BIOPSY SINGLE OR MULTIPLE;  Gastroscopy  ;  Surgeon: Jovanny Fuller MD;  Location: [facility]     SURGICAL HISTORY OF -       hernia times three-right inguinal     SURGICAL HISTORY OF -   1989    thyroidectomy       Social History   Substance Use Topics     Smoking status: Never Smoker     Smokeless tobacco: Never Used     Alcohol use Yes      Comment: 2-3 drinks a week     Family History   Problem Relation Age of Onset     Lipids Mother      Hypertension Father      CANCER  Father      sweat gland cancer     Other - See Comments Father 77     IPF - ideomatic pulmonary fibrosis     Lipids Brother      Thyroid Disease Brother      Arthritis Maternal Grandfather      HEART DISEASE Paternal Grandmother      Melanoma No family hx of            Reviewed and updated as needed this visit by clinical staff       Reviewed and updated as needed this visit by Provider         ROS:  Constitutional, HEENT, cardiovascular, pulmonary, gi and gu systems are negative, except as otherwise noted.    OBJECTIVE:     /84  Pulse 109  Temp 97.6  F (36.4  C) (Tympanic)  Wt 169 lb (76.7 kg)  SpO2 98%  BMI 22.92 kg/m2  Body mass index is 22.92 kg/(m^2).  GENERAL: healthy, alert and no distress  EYES: Eyes grossly normal to inspection, PERRL and conjunctivae and sclerae normal  HENT: ear canals and TM's normal, nose and mouth without ulcers or lesions  NECK: no adenopathy, no asymmetry, masses, or scars and thyroid normal to palpation  RESP: rhonchi bilateral- scattered, end-expiratory wheezes bilateral and decreased breath sounds throughout  CV: regular rates and rhythm, normal S1 S2, no S3 or S4 and no murmur, click or rub  SKIN: no suspicious lesions or rashes  NEURO: Normal strength and tone, mentation intact and speech normal    Diagnostic Test Results:  none     ASSESSMENT/PLAN:       ICD-10-CM    1. Acute bronchitis, unspecified organism J20.9 azithromycin (ZITHROMAX) 250 MG tablet       Follow up in 1 week for persistent symptoms, sooner for new or worsening symptoms.     Patient Instructions     Viral or Bacterial Bronchitis with Wheezing (Adult)    Bronchitis is an infection of the air passages. It often occurs during a cold and is usually caused by a virus. Symptoms include cough with mucus (phlegm) and low-grade fever. This illness is contagious during the first few days and is spread through the air by coughing and sneezing, or by direct contact (touching the sick person and then touching  your own eyes, nose, or mouth).  If there is a lot of inflammation, air flow is restricted. The air passages may also go into spasm, especially if you have asthma. This causes wheezing and difficulty breathing even in people who do not have asthma.  Bronchitis usually lasts 7 to 14 days. The wheezing should improve with treatment during the first week. An inhaler is often prescribed to relax the air passages and stop wheezing. Antibiotics will be prescribed if your doctor thinks there is also a secondary bacterial infection.  Home care    If symptoms are severe, rest at home for the first 2 to 3 days. When you go back to your usual activities, don't let yourself get too tired.    Do not smoke. Also avoid being exposed to secondhand smoke.    You may use over-the-counter medicine to control fever or pain, unless another medicine was prescribed. Note: If you have chronic liver or kidney disease or have ever had a stomach ulcer or gastrointestinal bleeding, talk with your healthcare provider before using these medicines. Also talk to your provider if you are taking medicine to prevent blood clots.) Aspirin should never be given to anyone younger than 18 years of age who is ill with a viral infection or fever. It may cause severe liver or brain damage.    Your appetite may be poor, so a light diet is fine. Avoid dehydration by drinking 6 to 8 glasses of fluids per day (such as water, soft drinks, sports drinks, juices, tea, or soup). Extra fluids will help loosen secretions in the nose and lungs.    Over-the-counter cough, cold, and sore-throat medicines will not shorten the length of the illness, but they may be helpful to reduce symptoms. (Note: Do not use decongestants if you have high blood pressure.)    If you were given an inhaler, use it exactly as directed. If you need to use it more often than prescribed, your condition may be worsening. If this happens, contact your healthcare provider.    If prescribed,  finish all antibiotic medicine, even if you are feeling better after only a few days.  Follow-up care  Follow up with your healthcare provider, or as advised. If you had an X-ray or ECG (electrocardiogram), a specialist will review it. You will be notified of any new findings that may affect your care.  If you are age 65 or older, or if you have a chronic lung disease or condition that affects your immune system, or you smoke, ask your healthcare provider about getting a pneumococcal vaccine and a yearly flu shot (influenza vaccine).  When to seek medical advice  Call your healthcare provider right away if any of these occur:    Fever of 100.4 F (38 C) or higher, or as directed by your healthcare provider    Coughing up increasing amounts of colored sputum    Weakness, drowsiness, headache, facial pain, ear pain, or a stiff neck  Call 911  Call 911 if any of these occur.    Coughing up blood    Worsening weakness, drowsiness, headache, or stiff neck    Increased wheezing not helped with medication, shortness of breath, or pain with breathing  Date Last Reviewed: 9/13/2015 2000-2017 [employer]. [address] [place] [address]. All rights reserved. This information is not intended as a substitute for professional medical care. Always follow your healthcare professional's instructions.            MATTHEW Rosales [facility]

## 2018-04-19 NOTE — MR AVS SNAPSHOT
After Visit Summary   4/19/2018    Marcelo Hawkins    MRN: [id number]           Patient Information     Date Of Birth          1965        Visit Information        Provider Department      4/19/2018 2:20 PM Hillary Contreras APRN [facility]        Today's Diagnoses     Acute bronchitis, unspecified organism    -  1      Care Instructions      Viral or Bacterial Bronchitis with Wheezing (Adult)    Bronchitis is an infection of the air passages. It often occurs during a cold and is usually caused by a virus. Symptoms include cough with mucus (phlegm) and low-grade fever. This illness is contagious during the first few days and is spread through the air by coughing and sneezing, or by direct contact (touching the sick person and then touching your own eyes, nose, or mouth).  If there is a lot of inflammation, air flow is restricted. The air passages may also go into spasm, especially if you have asthma. This causes wheezing and difficulty breathing even in people who do not have asthma.  Bronchitis usually lasts 7 to 14 days. The wheezing should improve with treatment during the first week. An inhaler is often prescribed to relax the air passages and stop wheezing. Antibiotics will be prescribed if your doctor thinks there is also a secondary bacterial infection.  Home care    If symptoms are severe, rest at home for the first 2 to 3 days. When you go back to your usual activities, don't let yourself get too tired.    Do not smoke. Also avoid being exposed to secondhand smoke.    You may use over-the-counter medicine to control fever or pain, unless another medicine was prescribed. Note: If you have chronic liver or kidney disease or have ever had a stomach ulcer or gastrointestinal bleeding, talk with your healthcare provider before using these medicines. Also talk to your provider if you are taking medicine to prevent blood clots.) Aspirin should never be given to anyone younger  than 18 years of age who is ill with a viral infection or fever. It may cause severe liver or brain damage.    Your appetite may be poor, so a light diet is fine. Avoid dehydration by drinking 6 to 8 glasses of fluids per day (such as water, soft drinks, sports drinks, juices, tea, or soup). Extra fluids will help loosen secretions in the nose and lungs.    Over-the-counter cough, cold, and sore-throat medicines will not shorten the length of the illness, but they may be helpful to reduce symptoms. (Note: Do not use decongestants if you have high blood pressure.)    If you were given an inhaler, use it exactly as directed. If you need to use it more often than prescribed, your condition may be worsening. If this happens, contact your healthcare provider.    If prescribed, finish all antibiotic medicine, even if you are feeling better after only a few days.  Follow-up care  Follow up with your healthcare provider, or as advised. If you had an X-ray or ECG (electrocardiogram), a specialist will review it. You will be notified of any new findings that may affect your care.  If you are age 65 or older, or if you have a chronic lung disease or condition that affects your immune system, or you smoke, ask your healthcare provider about getting a pneumococcal vaccine and a yearly flu shot (influenza vaccine).  When to seek medical advice  Call your healthcare provider right away if any of these occur:    Fever of 100.4 F (38 C) or higher, or as directed by your healthcare provider    Coughing up increasing amounts of colored sputum    Weakness, drowsiness, headache, facial pain, ear pain, or a stiff neck  Call 911  Call 911 if any of these occur.    Coughing up blood    Worsening weakness, drowsiness, headache, or stiff neck    Increased wheezing not helped with medication, shortness of breath, or pain with breathing  Date Last Reviewed: 9/13/2015 2000-2017 [employer]. [address] [facility], [place]  [address]. All rights reserved. This information is not intended as a substitute for professional medical care. Always follow your healthcare professional's instructions.                Follow-ups after your visit        Who to contact     If you have questions or need follow up information about today's clinic visit or your schedule please contact [facility] directly at [phone].  Normal or non-critical lab and imaging results will be communicated to you by MyChart, letter or phone within 4 business days after the clinic has received the results. If you do not hear from us within 7 days, please contact the clinic through [employer] or phone. If you have a critical or abnormal lab result, we will notify you by phone as soon as possible.  Submit refill requests through [employer] or call your pharmacy and they will forward the refill request to us. Please allow 3 business days for your refill to be completed.          Additional Information About Your Visit        [employer] Information     [employer] gives you secure access to your electronic health record. If you see a primary care provider, you can also send messages to your care team and make appointments. If you have questions, please call your primary care clinic.  If you do not have a primary care provider, please call [phone] and they will assist you.        Care EveryWhere ID     This is your Care EveryWhere ID. This could be used by other organizations to access your [place] medical records  [id number]        Your Vitals Were     Pulse Temperature Pulse Oximetry BMI (Body Mass Index)          109 97.6  F (36.4  C) (Tympanic) 98% 22.92 kg/m2         Blood Pressure from Last 3 Encounters:   04/19/18 132/84   12/15/17 120/78   11/17/17 130/90    Weight from Last 3 Encounters:   04/19/18 169 lb (76.7 kg)   11/02/17 176 lb (79.8 kg)   08/03/17 192 lb (87.1 kg)              Today, you had the following     No orders found for display         Today's  Medication Changes          These changes are accurate as of 4/19/18  2:47 PM.  If you have any questions, ask your nurse or doctor.               Start taking these medicines.        Dose/Directions    azithromycin 250 MG tablet   Commonly known as:  ZITHROMAX   Used for:  Acute bronchitis, unspecified organism        Two tablets first day, then one tablet daily for four days.   Quantity:  6 tablet   Refills:  0            Where to get your medicines      These medications were sent to [facility] [place], [place] - [address]  [address] [address]     Phone:  [phone]     azithromycin 250 MG tablet                Primary Care Provider Office Phone # Fax #    MATTHEW Landry [facility] [phone]       [address] [facility] [address]        Equal Access to Services     MARCIAL MONTES DE OCA : Hadii aad ku hadasho Somarcell, waaxda luqadaha, qaybta kaalmada adeegyada, zackary luevano . So [facility] [phone].    ATENCIÓN: Si habla español, tiene a bhatti disposición servicios gratuitos de asistencia lingüística. Llame al [phone].    We comply with applicable federal civil rights laws and [place] laws. We do not discriminate on the basis of race, color, national origin, age, disability, sex, sexual orientation, or gender identity.            Thank you!     Thank you for choosing [facility]  for your care. Our goal is always to provide you with excellent care. Hearing back from our patients is one way we can continue to improve our services. Please take a few minutes to complete the written survey that you may receive in the mail after your visit with us. Thank you!             Your Updated Medication List - Protect others around you: Learn how to safely use, store and throw away your medicines at www.disposemymeds.org.          This list is accurate as of 4/19/18  2:47 PM.  Always use your most recent med list.                    Brand Name Dispense Instructions for use Diagnosis    azithromycin 250 MG tablet    ZITHROMAX    6 tablet    Two tablets first day, then one tablet daily for four days.    Acute bronchitis, unspecified organism       calcium 600 + D 600-400 MG-UNIT per tablet   Generic drug:  calcium-vitamin D     100 tablet    Take 1 tablet by mouth 2 times daily.        * levothyroxine 112 MCG tablet    SYNTHROID    90 tablet    Take 1 tablet (112 mcg) by mouth three times a week BRAND NAME- Do NOT SUBSTITUTE    Postoperative hypothyroidism       * levothyroxine 50 MCG tablet    SYNTHROID    90 tablet    Take 1 tablet (50 mcg) by mouth three times a week BRAND NAME- Do NOT SUBSTITUTE    Postoperative hypothyroidism       * SYNTHROID 175 MCG tablet   Generic drug:  levothyroxine     24 tablet    Take 1 tablet 4 days a week alternating with taking 162 mcg 3 days per week    Postoperative hypothyroidism       loratadine 10 MG tablet    CLARITIN    90 tablet    Take 1 tablet (10 mg) by mouth daily    H/O seasonal allergies       MULTIVITAMIN PO           omeprazole 20 MG CR capsule    priLOSEC    90 capsule    TAKE 1 CAPSULE BY MOUTH  DAILY    Gastroesophageal reflux disease without esophagitis       order for DME     2 Device    Superfeet inserts    Left foot pain, Neuroma, Faust's, left       sildenafil 20 MG tablet    REVATIO    30 tablet    Take 1 tablet (20 mg) by mouth daily as needed 30 min to 4 hrs before sex. Do not use with nitroglycerin, terazosin or doxazosin.    Other male erectile dysfunction       simvastatin 20 MG tablet    ZOCOR    90 tablet    Take 1 tablet by mouth at  bedtime    Routine general medical examination at a health care facility, Hyperlipidemia LDL goal <130       * Notice:  This list has 3 medication(s) that are the same as other medications prescribed for you. Read the directions carefully, and ask your doctor or other care provider to review them with you.

## 2018-04-19 NOTE — PATIENT INSTRUCTIONS
Viral or Bacterial Bronchitis with Wheezing (Adult)    Bronchitis is an infection of the air passages. It often occurs during a cold and is usually caused by a virus. Symptoms include cough with mucus (phlegm) and low-grade fever. This illness is contagious during the first few days and is spread through the air by coughing and sneezing, or by direct contact (touching the sick person and then touching your own eyes, nose, or mouth).  If there is a lot of inflammation, air flow is restricted. The air passages may also go into spasm, especially if you have asthma. This causes wheezing and difficulty breathing even in people who do not have asthma.  Bronchitis usually lasts 7 to 14 days. The wheezing should improve with treatment during the first week. An inhaler is often prescribed to relax the air passages and stop wheezing. Antibiotics will be prescribed if your doctor thinks there is also a secondary bacterial infection.  Home care    If symptoms are severe, rest at home for the first 2 to 3 days. When you go back to your usual activities, don't let yourself get too tired.    Do not smoke. Also avoid being exposed to secondhand smoke.    You may use over-the-counter medicine to control fever or pain, unless another medicine was prescribed. Note: If you have chronic liver or kidney disease or have ever had a stomach ulcer or gastrointestinal bleeding, talk with your healthcare provider before using these medicines. Also talk to your provider if you are taking medicine to prevent blood clots.) Aspirin should never be given to anyone younger than 18 years of age who is ill with a viral infection or fever. It may cause severe liver or brain damage.    Your appetite may be poor, so a light diet is fine. Avoid dehydration by drinking 6 to 8 glasses of fluids per day (such as water, soft drinks, sports drinks, juices, tea, or soup). Extra fluids will help loosen secretions in the nose and lungs.    Over-the-counter  cough, cold, and sore-throat medicines will not shorten the length of the illness, but they may be helpful to reduce symptoms. (Note: Do not use decongestants if you have high blood pressure.)    If you were given an inhaler, use it exactly as directed. If you need to use it more often than prescribed, your condition may be worsening. If this happens, contact your healthcare provider.    If prescribed, finish all antibiotic medicine, even if you are feeling better after only a few days.  Follow-up care  Follow up with your healthcare provider, or as advised. If you had an X-ray or ECG (electrocardiogram), a specialist will review it. You will be notified of any new findings that may affect your care.  If you are age 65 or older, or if you have a chronic lung disease or condition that affects your immune system, or you smoke, ask your healthcare provider about getting a pneumococcal vaccine and a yearly flu shot (influenza vaccine).  When to seek medical advice  Call your healthcare provider right away if any of these occur:    Fever of 100.4 F (38 C) or higher, or as directed by your healthcare provider    Coughing up increasing amounts of colored sputum    Weakness, drowsiness, headache, facial pain, ear pain, or a stiff neck  Call 911  Call 911 if any of these occur.    Coughing up blood    Worsening weakness, drowsiness, headache, or stiff neck    Increased wheezing not helped with medication, shortness of breath, or pain with breathing  Date Last Reviewed: 9/13/2015 2000-2017 [employer]. [address] [place] [address]. All rights reserved. This information is not intended as a substitute for professional medical care. Always follow your healthcare professional's instructions.

## 2018-05-17 DIAGNOSIS — K21.9 GASTROESOPHAGEAL REFLUX DISEASE WITHOUT ESOPHAGITIS: ICD-10-CM

## 2018-05-18 NOTE — TELEPHONE ENCOUNTER
"Requested Prescriptions   Pending Prescriptions Disp Refills     omeprazole (PRILOSEC) 20 MG CR capsule [Pharmacy Med Name: OMEPRAZOLE  20MG  CAP]  Last Written Prescription Date:  09/26/17  Last Fill Quantity: 90,  # refills: 2   Last office visit: 4/19/2018 with prescribing provider:  04/19/18   Future Office Visit:     90 capsule      Sig: TAKE 1 CAPSULE BY MOUTH  DAILY    PPI Protocol Passed    5/17/2018  8:32 PM       Passed - Not on Clopidogrel (unless Pantoprazole ordered)       Passed - No diagnosis of osteoporosis on record       Passed - Recent (12 mo) or future (30 days) visit within the authorizing provider's specialty    Patient had office visit in the last 12 months or has a visit in the next 30 days with authorizing provider or within the authorizing provider's specialty.  See \"Patient Info\" tab in inbasket, or \"Choose Columns\" in Meds & Orders section of the refill encounter.           Passed - Patient is age 18 or older          "

## 2018-06-28 DIAGNOSIS — Z00.00 ROUTINE GENERAL MEDICAL EXAMINATION AT A HEALTH CARE FACILITY: ICD-10-CM

## 2018-06-28 DIAGNOSIS — E78.5 HYPERLIPIDEMIA LDL GOAL <130: ICD-10-CM

## 2018-06-29 NOTE — TELEPHONE ENCOUNTER
"Requested Prescriptions   Pending Prescriptions Disp Refills     simvastatin (ZOCOR) 20 MG tablet [Pharmacy Med Name: SIMVASTATIN  20MG  TAB]  Last Written Prescription Date:  06/8/17  Last Fill Quantity: 90,  # refills: 3   Last office visit: 4/19/2018 with prescribing provider:  04/19/18   Future Office Visit:     90 tablet      Sig: TAKE 1 TABLET BY MOUTH AT  BEDTIME    Statins Protocol Failed    6/28/2018  8:52 PM       Failed - LDL on file in past 12 months    Recent Labs   Lab Test  05/12/17   0611   LDL  113*          Passed - No abnormal creatine kinase in past 12 months    No lab results found.        Passed - Recent (12 mo) or future (30 days) visit within the authorizing provider's specialty    Patient had office visit in the last 12 months or has a visit in the next 30 days with authorizing provider or within the authorizing provider's specialty.  See \"Patient Info\" tab in inbasket, or \"Choose Columns\" in Meds & Orders section of the refill encounter.           Passed - Patient is age 18 or older          "

## 2018-06-29 NOTE — TELEPHONE ENCOUNTER
Routing refill request to provider for review/approval because:  Labs not current:  LDL, ALT-orders pended.     Michelle MARTINEZ RN

## 2018-07-02 RX ORDER — SIMVASTATIN 20 MG
TABLET ORAL
Qty: 90 TABLET | Refills: 3 | Status: SHIPPED | OUTPATIENT
Start: 2018-07-02 | End: 2020-01-29

## 2018-08-23 ENCOUNTER — OFFICE VISIT (OUTPATIENT)
Dept: FAMILY MEDICINE | Facility: CLINIC | Age: 53
End: 2018-08-23
Payer: COMMERCIAL

## 2018-08-23 ENCOUNTER — TELEPHONE (OUTPATIENT)
Dept: LAB | Facility: CLINIC | Age: 53
End: 2018-08-23

## 2018-08-23 VITALS
WEIGHT: 181 LBS | OXYGEN SATURATION: 98 % | HEART RATE: 77 BPM | SYSTOLIC BLOOD PRESSURE: 130 MMHG | BODY MASS INDEX: 24.52 KG/M2 | DIASTOLIC BLOOD PRESSURE: 82 MMHG | HEIGHT: 72 IN | TEMPERATURE: 96.1 F

## 2018-08-23 DIAGNOSIS — M85.80 OSTEOPENIA, UNSPECIFIED LOCATION: ICD-10-CM

## 2018-08-23 DIAGNOSIS — C73 PAPILLARY CARCINOMA OF THYROID (H): ICD-10-CM

## 2018-08-23 DIAGNOSIS — K21.00 GASTROESOPHAGEAL REFLUX DISEASE WITH ESOPHAGITIS: ICD-10-CM

## 2018-08-23 DIAGNOSIS — Z12.5 SCREENING FOR PROSTATE CANCER: ICD-10-CM

## 2018-08-23 DIAGNOSIS — Z00.00 ENCOUNTER FOR GENERAL ADULT MEDICAL EXAMINATION WITHOUT ABNORMAL FINDINGS: Primary | ICD-10-CM

## 2018-08-23 DIAGNOSIS — E78.5 HYPERLIPIDEMIA LDL GOAL <130: ICD-10-CM

## 2018-08-23 DIAGNOSIS — Z13.1 SCREENING FOR DIABETES MELLITUS: ICD-10-CM

## 2018-08-23 DIAGNOSIS — E89.0 POSTSURGICAL HYPOTHYROIDISM: ICD-10-CM

## 2018-08-23 DIAGNOSIS — Z11.4 SCREENING FOR HIV (HUMAN IMMUNODEFICIENCY VIRUS): ICD-10-CM

## 2018-08-23 DIAGNOSIS — Z11.59 NEED FOR HEPATITIS C SCREENING TEST: ICD-10-CM

## 2018-08-23 DIAGNOSIS — R03.0 ELEVATED BLOOD PRESSURE READING WITHOUT DIAGNOSIS OF HYPERTENSION: ICD-10-CM

## 2018-08-23 DIAGNOSIS — N52.8 OTHER MALE ERECTILE DYSFUNCTION: ICD-10-CM

## 2018-08-23 LAB
ALT SERPL W P-5'-P-CCNC: 40 U/L (ref 0–70)
CHOLEST SERPL-MCNC: 215 MG/DL
GLUCOSE SERPL-MCNC: 91 MG/DL (ref 70–99)
HBA1C MFR BLD: 5.5 % (ref 0–5.6)
HCV AB SERPL QL IA: NONREACTIVE
HDLC SERPL-MCNC: 61 MG/DL
HIV 1+2 AB+HIV1 P24 AG SERPL QL IA: NONREACTIVE
LDLC SERPL CALC-MCNC: 125 MG/DL
NONHDLC SERPL-MCNC: 154 MG/DL
PSA SERPL-ACNC: 1.62 UG/L (ref 0–4)
T4 FREE SERPL-MCNC: 1.14 NG/DL (ref 0.76–1.46)
TRIGL SERPL-MCNC: 147 MG/DL
TSH SERPL DL<=0.005 MIU/L-ACNC: 1.66 MU/L (ref 0.4–4)

## 2018-08-23 PROCEDURE — 36415 COLL VENOUS BLD VENIPUNCTURE: CPT | Performed by: INTERNAL MEDICINE

## 2018-08-23 PROCEDURE — 86803 HEPATITIS C AB TEST: CPT | Performed by: INTERNAL MEDICINE

## 2018-08-23 PROCEDURE — 87389 HIV-1 AG W/HIV-1&-2 AB AG IA: CPT | Performed by: INTERNAL MEDICINE

## 2018-08-23 PROCEDURE — 82947 ASSAY GLUCOSE BLOOD QUANT: CPT | Performed by: INTERNAL MEDICINE

## 2018-08-23 PROCEDURE — 84460 ALANINE AMINO (ALT) (SGPT): CPT | Performed by: INTERNAL MEDICINE

## 2018-08-23 PROCEDURE — 80061 LIPID PANEL: CPT | Performed by: INTERNAL MEDICINE

## 2018-08-23 PROCEDURE — G0103 PSA SCREENING: HCPCS | Performed by: INTERNAL MEDICINE

## 2018-08-23 PROCEDURE — 83036 HEMOGLOBIN GLYCOSYLATED A1C: CPT | Performed by: INTERNAL MEDICINE

## 2018-08-23 PROCEDURE — 84439 ASSAY OF FREE THYROXINE: CPT | Performed by: INTERNAL MEDICINE

## 2018-08-23 PROCEDURE — 99396 PREV VISIT EST AGE 40-64: CPT | Performed by: INTERNAL MEDICINE

## 2018-08-23 PROCEDURE — 84443 ASSAY THYROID STIM HORMONE: CPT | Performed by: INTERNAL MEDICINE

## 2018-08-23 RX ORDER — LEVOTHYROXINE SODIUM 175 MCG
TABLET ORAL
Qty: 60 TABLET | Refills: 3 | Status: SHIPPED | OUTPATIENT
Start: 2018-08-23 | End: 2018-12-06

## 2018-08-23 RX ORDER — LEVOTHYROXINE SODIUM 50 UG/1
TABLET ORAL
Qty: 24 TABLET | Refills: 3 | Status: SHIPPED | OUTPATIENT
Start: 2018-08-23 | End: 2018-12-06

## 2018-08-23 RX ORDER — SILDENAFIL CITRATE 20 MG/1
20 TABLET ORAL DAILY PRN
Qty: 30 TABLET | Refills: 3 | Status: SHIPPED | OUTPATIENT
Start: 2018-08-23 | End: 2018-12-06

## 2018-08-23 RX ORDER — LEVOTHYROXINE SODIUM 112 UG/1
TABLET ORAL
Qty: 8 TABLET | Refills: 3 | Status: SHIPPED | OUTPATIENT
Start: 2018-08-23 | End: 2018-12-06

## 2018-08-23 NOTE — PROGRESS NOTES
Thyroid still not at goal, but close.  Recommend alternate 175 mcg FIVE days per week with 162 mcg TWO days per week.  Recheck TSH in 2 months.  Liver enzyme, blood sugar, PSA is normal.  Cholesterol slightly higher than 1 year ago.

## 2018-08-23 NOTE — PROGRESS NOTES
SUBJECTIVE:   CC: Marcelo Hawkins is an 53 year old male who presents for preventative health visit.     Healthy Habits:    Do you get at least three servings of calcium containing foods daily (dairy, green leafy vegetables, etc.)? yes    Amount of exercise or daily activities, outside of work: 5 day(s) per week    Problems taking medications regularly No    Medication side effects: No    Have you had an eye exam in the past two years? yes    Do you see a dentist twice per year? yes    Do you have sleep apnea, excessive snoring or daytime drowsiness?no       Refills: Viagra    Post-surgical hypothyroid:  Reports TSH goal should be 0.7-1.4. Currently he is taking 175 4 days week alternate with 162 3 days per week.  He has tried taking same dose daily, but numbers were always off, not at goal as above.  Prior to march he was doing 175 3 days per week and 162 4 days per week.    Needs form filled out for biometric screening.  Would like prostate exam and PSA    GERD:  EGD 2017.  On H2 blocker  Normal nasopharynx and oropharynx.                        - LA Grade A reflux esophagitis. Biopsied.                        - Z-line, at the gastroesophageal junction.                        - Small hiatal hernia.                        - Normal examined duodenum.   Recommendation:      - Discharge patient to home.                        - Await pathology results.                        - Return to referring physician PRN.     Today's PHQ-2 Score: 0  PHQ-2 ( 1999 [employer]) 8/23/2018 5/8/2017   Q1: Little interest or pleasure in doing things 0 0   Q2: Feeling down, depressed or hopeless 0 0   PHQ-2 Score 0 0   Q1: Little interest or pleasure in doing things - -   Q2: Feeling down, depressed or hopeless - -   PHQ-2 Score - -       Abuse: Current or Past(Physical, Sexual or Emotional)- No  Do you feel safe in your environment - Yes    Social History   Substance Use Topics     Smoking status: Never Smoker     Smokeless tobacco: Never  Used     Alcohol use Yes      Comment: 2-3 drinks a week      If you drink alcohol do you typically have >3 drinks per day or >7 drinks per week? No                      Last PSA:   PSA   Date Value Ref Range Status   12/17/2013 1.50 0 - 4 ug/L Final       Reviewed orders with patient. Reviewed health maintenance and updated orders accordingly - Yes  Current Outpatient Prescriptions   Medication Sig Dispense Refill     calcium-vitamin D (CALCIUM 600 + D) 600-400 MG-UNIT per tablet Take 1 tablet by mouth 2 times daily. 100 tablet 3     levothyroxine (SYNTHROID) 112 MCG tablet Take 1 tablet (112 mcg) by mouth three times a week BRAND NAME- Do NOT SUBSTITUTE 90 tablet 3     levothyroxine (SYNTHROID) 50 MCG tablet Take 1 tablet (50 mcg) by mouth three times a week BRAND NAME- Do NOT SUBSTITUTE 90 tablet 3     Multiple Vitamins-Minerals (MULTIVITAMIN OR)        ORDER FOR DME Superfeet inserts 2 Device 0     RaNITidine HCl (RANITIDINE 75 PO) Take 75 mg by mouth daily       simvastatin (ZOCOR) 20 MG tablet TAKE 1 TABLET BY MOUTH AT  BEDTIME 90 tablet 3     SYNTHROID 175 MCG tablet Take 1 tablet 4 days a week alternating with taking 162 mcg 3 days per week 24 tablet 5     loratadine (CLARITIN) 10 MG tablet Take 1 tablet (10 mg) by mouth daily (Patient not taking: Reported on 8/23/2018) 90 tablet 3     sildenafil (REVATIO) 20 MG tablet Take 1 tablet (20 mg) by mouth daily as needed 30 min to 4 hrs before sex. Do not use with nitroglycerin, terazosin or doxazosin. (Patient not taking: Reported on 8/23/2018) 30 tablet 3       Reviewed and updated as needed this visit by clinical staff  Tobacco  Allergies  Meds  Problems  Med Hx  Surg Hx  Fam Hx  Soc Hx          Reviewed and updated as needed this visit by Provider  Allergies  Meds  Problems  Med Hx  Surg Hx  Fam Hx            ROS:  CONSTITUTIONAL: NEGATIVE for fever, chills, change in weight  INTEGUMENTARY/SKIN: NEGATIVE for worrisome rashes, moles or  lesions  EYES: NEGATIVE for vision changes or irritation  ENT: NEGATIVE for ear, mouth and throat problems  RESP: NEGATIVE for significant cough or SOB  CV: NEGATIVE for chest pain, palpitations or peripheral edema  GI: NEGATIVE for nausea, abdominal pain, heartburn, or change in bowel habits   male: negative for dysuria, hematuria, decreased urinary stream, erectile dysfunction, urethral discharge  MUSCULOSKELETAL: NEGATIVE for significant arthralgias or myalgia  NEURO: NEGATIVE for weakness, dizziness or paresthesias  PSYCHIATRIC: NEGATIVE for changes in mood or affect    OBJECTIVE:   /82 (BP Location: Right arm, Patient Position: Sitting, Cuff Size: Adult Large)  Pulse 77  Temp 96.1  F (35.6  C) (Tympanic)  Ht 6' (1.829 m)  Wt 181 lb (82.1 kg)  SpO2 98%  BMI 24.55 kg/m2  EXAM:  GENERAL: healthy, alert and no distress  EYES: Eyes grossly normal to inspection, PERRL and conjunctivae and sclerae normal  HENT: ear canals and TM's normal, nose and mouth without ulcers or lesions  NECK: no adenopathy, no asymmetry, masses, or scars and thyroid normal to palpation  RESP: lungs clear to auscultation - no rales, rhonchi or wheezes  CV: regular rate and rhythm, normal S1 S2, no S3 or S4, no murmur, click or rub, no peripheral edema and peripheral pulses strong  ABDOMEN: soft, nontender, no hepatosplenomegaly, no masses and bowel sounds normal  RECTAL: normal sphincter tone, no rectal masses, prostate normal size, smooth, nontender without nodules or masses  MS: no gross musculoskeletal defects noted, no edema  SKIN: no suspicious lesions or rashes  NEURO: Normal strength and tone, mentation intact and speech normal  PSYCH: mentation appears normal, affect normal/bright    Diagnostic Test Results:  none     ASSESSMENT/PLAN:   1. Encounter for general adult medical examination without abnormal findings    2. Papillary carcinoma of thyroid (H) - increase dose, TSH not quite at goal. Change from 175 4 days week  alternate with 162 3 days per week to 175 five days/162 two days week.  DINORA.  Recheck 2 months.  - TSH  - T4, free    3. Postsurgical hypothyroidism  - TSH  - T4, free    4. Osteopenia, unspecified location - had DEXA 2017 - stable    5. Gastroesophageal reflux disease with esophagitis - discussed continued H2 blocker    6. Elevated blood pressure reading without diagnosis of hypertension - father with hypertension, patient at risk.  Discussed lifestyle changes    7. Hyperlipidemia LDL goal <130  - ALT  - Lipid panel reflex to direct LDL Fasting    8. Need for hepatitis C screening test  - Hepatitis C Screen Reflex to HCV RNA Quant and Genotype    9. Screening for HIV (human immunodeficiency virus)  - HIV Screening    10. Other male erectile dysfunction -  - stable, refill provided  - sildenafil (REVATIO) 20 MG tablet; Take 1 tablet (20 mg) by mouth daily as needed 30 min to 4 hrs before sex. Do not use with nitroglycerin, terazosin or doxazosin.  Dispense: 30 tablet; Refill: 3    11. Screening for diabetes mellitus  - Glucose  - Hemoglobin A1c    12. Screening for prostate cancer  - PSA, screen    COUNSELING:  Reviewed preventive health counseling, as reflected in patient instructions    BP Readings from Last 1 Encounters:   08/23/18 130/82     Estimated body mass index is 24.55 kg/(m^2) as calculated from the following:    Height as of this encounter: 6' (1.829 m).    Weight as of this encounter: 181 lb (82.1 kg).    BP Screening:   Last 3 BP Readings:    BP Readings from Last 3 Encounters:   08/23/18 130/82   04/19/18 132/84   12/15/17 120/78       The following was recommended to the patient:  Re-screen BP within a year and recommended lifestyle modifications       reports that he has never smoked. He has never used smokeless tobacco.      Counseling Resources:  ATP IV Guidelines  Pooled Cohorts Equation Calculator  FRAX Risk Assessment  ICSI Preventive Guidelines  Dietary Guidelines for Americans, 2010  USDA's  MyPlate  ASA Prophylaxis  Lung CA Screening    Maya Villarreal, [facility]

## 2018-08-23 NOTE — TELEPHONE ENCOUNTER
Pt stop by our  and signed the form.  Staff faxed it to the insurance [phone] as requested by the pt.  Pt informed.  Siri Cadet CMA (ML)   (aka: Tara Cadet)

## 2018-08-23 NOTE — TELEPHONE ENCOUNTER
Pt was seen today and handle to the provider during the visit a PHYSICIAN RESULT FORM, which he forgot to signed.  Staff cannot fax the form since pt did not signed.  Called pt today and verbalize the above problem.  Form placed at the  for pt to .  Pt can signed and leave for us to signed otherwise can  signed and fax to the insurance.  Siri Cadet CMA (ML)   (aka: Tara Cadet)

## 2018-08-23 NOTE — MR AVS SNAPSHOT
After Visit Summary   8/23/2018    Marcelo Hawkins    MRN: [id number]           Patient Information     Date Of Birth          1965        Visit Information        Provider Department      8/23/2018 7:00 AM Maya Villarreal,  [facility]        Today's Diagnoses     Encounter for general adult medical examination without abnormal findings    -  1    Papillary carcinoma of thyroid (H)        Postsurgical hypothyroidism        Osteopenia, unspecified location        Gastroesophageal reflux disease with esophagitis        Hyperlipidemia LDL goal <130        Need for hepatitis C screening test        Screening for HIV (human immunodeficiency virus)        Other male erectile dysfunction        Screening for diabetes mellitus        Screening for prostate cancer          Care Instructions      Preventive Health Recommendations  Male Ages 50 - 64    Yearly exam:             See your health care provider every year in order to  o   Review health changes.   o   Discuss preventive care.    o   Review your medicines if your doctor has prescribed any.     Have a cholesterol test every 5 years, or more frequently if you are at risk for high cholesterol/heart disease.     Have a diabetes test (fasting glucose) every three years. If you are at risk for diabetes, you should have this test more often.     Have a colonoscopy at age 50, or have a yearly FIT test (stool test). These exams will check for colon cancer.      Talk with your health care provider about whether or not a prostate cancer screening test (PSA) is right for you.    You should be tested each year for STDs (sexually transmitted diseases), if you re at risk.     Shots: Get a flu shot each year. Get a tetanus shot every 10 years.     Nutrition:    Eat at least 5 servings of fruits and vegetables daily.     Eat whole-grain bread, whole-wheat pasta and brown rice instead of white grains and rice.     Get adequate Calcium and Vitamin D.      Lifestyle    Exercise for at least 150 minutes a week (30 minutes a day, 5 days a week). This will help you control your weight and prevent disease.     Limit alcohol to one drink per day.     No smoking.     Wear sunscreen to prevent skin cancer.     See your dentist every six months for an exam and cleaning.     See your eye doctor every 1 to 2 years.            Follow-ups after your visit        Who to contact     If you have questions or need follow up information about today's clinic visit or your schedule please contact [facility] directly at [phone].  Normal or non-critical lab and imaging results will be communicated to you by [employer], letter or phone within 4 business days after the clinic has received the results. If you do not hear from us within 7 days, please contact the clinic through [employer] or phone. If you have a critical or abnormal lab result, we will notify you by phone as soon as possible.  Submit refill requests through [employer] or call your pharmacy and they will forward the refill request to us. Please allow 3 business days for your refill to be completed.          Additional Information About Your Visit        [employer] Information     [employer] gives you secure access to your electronic health record. If you see a primary care provider, you can also send messages to your care team and make appointments. If you have questions, please call your primary care clinic.  If you do not have a primary care provider, please call [phone] and they will assist you.        Care EveryWhere ID     This is your Care EveryWhere ID. This could be used by other organizations to access your [place] medical records  [id number]        Your Vitals Were     Pulse Temperature Height Pulse Oximetry BMI (Body Mass Index)       77 96.1  F (35.6  C) (Tympanic) 6' (1.829 m) 98% 24.55 kg/m2        Blood Pressure from Last 3 Encounters:   08/23/18 130/82   04/19/18 132/84   12/15/17 120/78     Weight from Last 3 Encounters:   08/23/18 181 lb (82.1 kg)   04/19/18 169 lb (76.7 kg)   11/02/17 176 lb (79.8 kg)              We Performed the Following     ALT     Glucose     Hemoglobin A1c     Hepatitis C Screen Reflex to HCV RNA Quant and Genotype     HIV Screening     Lipid panel reflex to direct LDL Fasting     PSA, screen     T4, free     TSH          Where to get your medicines      These medications were sent to [facility] - - [place] - [id number] [facility] [address] [facility]  [id number] [address] [address]    Hours:  JONAH Bailey [facility] Phone:  [phone]     sildenafil 20 MG tablet          Primary Care Provider Office Phone # Fax #    MATTHEW Landry [facility] [phone] [address] [facility] [address]        Equal Access to Services     MARCIAL MONTES DE OCA : Hadii reynold mendoza hadasho Soomaali, waaxda luqadaha, qaybta kaalmada adeegyada, zackary luevano . So [facility] [phone].    ATENCIÓN: Si habla español, tiene a bhatti disposición servicios gratuitos de asistencia lingüística. Samariaame al [phone].    We comply with applicable federal civil rights laws and [place] laws. We do not discriminate on the basis of race, color, national origin, age, disability, sex, sexual orientation, or gender identity.            Thank you!     Thank you for choosing [facility]  for your care. Our goal is always to provide you with excellent care. Hearing back from our patients is one way we can continue to improve our services. Please take a few minutes to complete the written survey that you may receive in the mail after your visit with us. Thank you!             Your Updated Medication List - Protect others around you: Learn how to safely use, store and throw away your medicines at www.disposemymeds.org.          This list is accurate as of 8/23/18  7:22 AM.  Always use your most recent med list.                   Brand Name Dispense  Instructions for use Diagnosis    calcium 600 + D 600-400 MG-UNIT per tablet   Generic drug:  calcium-vitamin D     100 tablet    Take 1 tablet by mouth 2 times daily.        * levothyroxine 112 MCG tablet    SYNTHROID    90 tablet    Take 1 tablet (112 mcg) by mouth three times a week BRAND NAME- Do NOT SUBSTITUTE    Postoperative hypothyroidism       * levothyroxine 50 MCG tablet    SYNTHROID    90 tablet    Take 1 tablet (50 mcg) by mouth three times a week BRAND NAME- Do NOT SUBSTITUTE    Postoperative hypothyroidism       * SYNTHROID 175 MCG tablet   Generic drug:  levothyroxine     24 tablet    Take 1 tablet 4 days a week alternating with taking 162 mcg 3 days per week    Postoperative hypothyroidism       loratadine 10 MG tablet    CLARITIN    90 tablet    Take 1 tablet (10 mg) by mouth daily    H/O seasonal allergies       MULTIVITAMIN PO           order for DME     2 Device    Superfeet inserts    Left foot pain, Neuroma, Faust's, left       RANITIDINE 75 PO      Take 75 mg by mouth daily        sildenafil 20 MG tablet    REVATIO    30 tablet    Take 1 tablet (20 mg) by mouth daily as needed 30 min to 4 hrs before sex. Do not use with nitroglycerin, terazosin or doxazosin.    Other male erectile dysfunction       simvastatin 20 MG tablet    ZOCOR    90 tablet    TAKE 1 TABLET BY MOUTH AT  BEDTIME    Routine general medical examination at a health care facility, Hyperlipidemia LDL goal <130       * Notice:  This list has 3 medication(s) that are the same as other medications prescribed for you. Read the directions carefully, and ask your doctor or other care provider to review them with you.

## 2018-10-05 ENCOUNTER — OFFICE VISIT (OUTPATIENT)
Dept: DERMATOLOGY | Facility: CLINIC | Age: 53
End: 2018-10-05
Payer: COMMERCIAL

## 2018-10-05 VITALS — HEART RATE: 85 BPM | DIASTOLIC BLOOD PRESSURE: 83 MMHG | OXYGEN SATURATION: 98 % | SYSTOLIC BLOOD PRESSURE: 141 MMHG

## 2018-10-05 DIAGNOSIS — L81.2 EPHELIDES: ICD-10-CM

## 2018-10-05 DIAGNOSIS — L57.0 AK (ACTINIC KERATOSIS): ICD-10-CM

## 2018-10-05 DIAGNOSIS — D18.01 CHERRY ANGIOMA: ICD-10-CM

## 2018-10-05 DIAGNOSIS — L82.1 SEBORRHEIC KERATOSIS: ICD-10-CM

## 2018-10-05 DIAGNOSIS — L81.4 LENTIGO: ICD-10-CM

## 2018-10-05 DIAGNOSIS — D48.5 NEOPLASM OF UNCERTAIN BEHAVIOR OF SKIN: Primary | ICD-10-CM

## 2018-10-05 PROCEDURE — 88305 TISSUE EXAM BY PATHOLOGIST: CPT | Mod: TC | Performed by: PHYSICIAN ASSISTANT

## 2018-10-05 PROCEDURE — 17000 DESTRUCT PREMALG LESION: CPT | Mod: 51 | Performed by: PHYSICIAN ASSISTANT

## 2018-10-05 PROCEDURE — 11100 HC DESTRUCT PREMALIGNANT LESION, FIRST: CPT | Mod: 59 | Performed by: PHYSICIAN ASSISTANT

## 2018-10-05 PROCEDURE — 17003 DESTRUCT PREMALG LES 2-14: CPT | Performed by: PHYSICIAN ASSISTANT

## 2018-10-05 PROCEDURE — 99213 OFFICE O/P EST LOW 20 MIN: CPT | Mod: 25 | Performed by: PHYSICIAN ASSISTANT

## 2018-10-05 NOTE — LETTER
10/5/2018         RE: Marcelo Hawkins  [address] [address] [place] [id number]        Dear Colleague,    Thank you for referring your patient, Marcelo Hawkins, to the [facility]. Please see a copy of my visit note below.    Marcelo Hawkins is a 53 year old year old male patient here today for skin check.  Patient notes a new pink spot on left forearm present for about a month. He denies any pain or itching.  Patient has no other skin complaints today.  Remainder of the HPI, Meds, PMH, Allergies, FH, and SH was reviewed in chart.   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, residual hypothyroidism     Other and unspecified hyperlipidemia      Postsurgical hypothyroidism     s/p thryoidectomy        Past Surgical History:   Procedure Laterality Date     COLONOSCOPY N/A 3/17/2016    Procedure: COLONOSCOPY;  Surgeon: Jovanny Fuller MD;  Location: [facility]     ESOPHAGOSCOPY, GASTROSCOPY, DUODENOSCOPY (EGD), COMBINED N/A 5/18/2017    Procedure: COMBINED ESOPHAGOSCOPY, GASTROSCOPY, DUODENOSCOPY (EGD), BIOPSY SINGLE OR MULTIPLE;  Gastroscopy  ;  Surgeon: Jovanny Fuller MD;  Location: [facility]     SURGICAL HISTORY OF -       hernia times three-right inguinal     SURGICAL HISTORY OF -   1989    thyroidectomy        Family History   Problem Relation Age of Onset     Lipids Mother      Hypertension Father      Cancer Father      sweat gland cancer     Other - See Comments Father 77     IPF - ideomatic pulmonary fibrosis     Lipids Brother      Thyroid Disease Brother      Arthritis Maternal Grandfather      HEART DISEASE Paternal Grandmother      Melanoma No family hx of        Social History     Social History     Marital status:      Spouse name: Merline     Number of children: 2     Years of education: N/A     Occupational History      [employer]     Social History Main Topics     Smoking status: Never Smoker     Smokeless tobacco:  Never Used     Alcohol use Yes      Comment: 2-3 drinks a week     Drug use: No     Sexual activity: Yes     Partners: Female     Birth control/ protection: Surgical     Other Topics Concern     Caffeine Concern Yes     1 coffee daily     Exercise Yes     walks & Running-3 x days a week     Seat Belt Yes     Self-Exams Yes     Parent/Sibling W/ Cabg, Mi Or Angioplasty Before 65f 55m? No     Social History Narrative       Outpatient Encounter Prescriptions as of 10/5/2018   Medication Sig Dispense Refill     calcium-vitamin D (CALCIUM 600 + D) 600-400 MG-UNIT per tablet Take 1 tablet by mouth 2 times daily. 100 tablet 3     levothyroxine (SYNTHROID) 112 MCG tablet Take 1 tablet (to make 162 mcg) 2 days a week alternating with taking 175 mcg 5 days per week. BRAND NAME- Do NOT SUBSTITUTE 8 tablet 3     levothyroxine (SYNTHROID) 50 MCG tablet Take 1 tablet (to make 162 mcg) 2 days a week alternating with taking 175 mcg 5 days per week. BRAND NAME- Do NOT SUBSTITUTE 24 tablet 3     Multiple Vitamins-Minerals (MULTIVITAMIN OR)        ORDER FOR DME Superfeet inserts 2 Device 0     RaNITidine HCl (RANITIDINE 75 PO) Take 75 mg by mouth daily       sildenafil (REVATIO) 20 MG tablet Take 1 tablet (20 mg) by mouth daily as needed 30 min to 4 hrs before sex. Do not use with nitroglycerin, terazosin or doxazosin. 30 tablet 3     simvastatin (ZOCOR) 20 MG tablet TAKE 1 TABLET BY MOUTH AT  BEDTIME 90 tablet 3     SYNTHROID 175 MCG tablet Take 1 tablet 5 days a week alternating with taking 162 mcg 2 days per week 60 tablet 3     loratadine (CLARITIN) 10 MG tablet Take 1 tablet (10 mg) by mouth daily (Patient not taking: Reported on 8/23/2018) 90 tablet 3     No facility-administered encounter medications on file as of 10/5/2018.              Review Of Systems  Skin: As above  Eyes: negative  Ears/Nose/Throat: negative  Respiratory: No shortness of breath, dyspnea on exertion, cough, or hemoptysis  Cardiovascular:  negative  Gastrointestinal: negative  Genitourinary: negative  Musculoskeletal: negative  Neurologic: negative  Psychiatric: negative  Hematologic/Lymphatic/Immunologic: negative  Endocrine: negative      O:   NAD, WDWN, Alert & Oriented, Mood & Affect wnl, Vitals stable   Here today alone   /83  Pulse 85  SpO2 98%   General appearance normal   Vitals stable   Alert, oriented and in no acute distress      Brown macules on face, torso and extremities   Brown stuck on papules on torso and extremities   Red papules on torso   1.6 cm pink scaly thin plaque on left forearm    Pink gritty papules on right  hand x 2        The remainder of the detailed exam was unremarkable; the following areas were examined:  scalp/hair, conjunctiva/lids, face, neck, lips/teeth, oral mucosa/gingiva, chest, back, abdomen, buttocks, digits/nails, RUE, LUE, RLE, LLE.      Eyes: Conjunctivae/lids:Normal     ENT: Lips: normal    MSK:Normal    Cardiovascular: peripheral edema none    Pulm: Breathing Normal    Neuro/Psych: Orientation:Normal; Mood/Affect:Normal  A/P:  1. R/O dermatitis vs ak vs scis vs isk on left forearm   TANGENTIAL BIOPSY SENT OUT:  After consent, anesthesia with LEC and prep, tangential excision performed and specimen sent out for permanent section histology.  No complications and routine wound care. Patient told to call our office in 1-2 weeks for result.      2. Actinic keratosis x 2 on right hand   LN2:  Treated with LN2 for 5s for 1-2 cycles. Warned risks of blistering, pain, pigment change, scarring, and incomplete resolution.  Advised patient to return if lesions do not completely resolve.  Wound care sheet given.  3. Lentigo, ephelides, angioma, seborrheic keratosis   BENIGN LESIONS DISCUSSED WITH PATIENT:  I discussed the specifics of tumor, prognosis, and genetics of benign lesions.  I explained that treatment of these lesions would be purely cosmetic and not medically neccessary.  I discussed with patient  different removal options including excision, cautery and /or laser.      Nature and genetics of benign skin lesions dicussed with patient.  Signs and Symptoms of skin cancer discussed with patient.  ABCDEs of melanoma reviewed with patient.  Patient encouraged to perform monthly skin exams.  UV precautions reviewed with patient.  Risks of non-melanoma skin cancer discussed with patient   Return to clinic in one year or sooner if needed.     Patient to follow up with Primary Care provider regarding elevated blood pressure.      Again, thank you for allowing me to participate in the care of your patient.        Sincerely,        Stefani Clifton PA-C

## 2018-10-05 NOTE — PROGRESS NOTES
Marcelo Hawkins is a 53 year old year old male patient here today for skin check.  Patient notes a new pink spot on left forearm present for about a month. He denies any pain or itching.  Patient has no other skin complaints today.  Remainder of the HPI, Meds, PMH, Allergies, FH, and SH was reviewed in chart.   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, residual hypothyroidism     Other and unspecified hyperlipidemia      Postsurgical hypothyroidism     s/p thryoidectomy        Past Surgical History:   Procedure Laterality Date     COLONOSCOPY N/A 3/17/2016    Procedure: COLONOSCOPY;  Surgeon: Jovanny Fuller MD;  Location: [facility]     ESOPHAGOSCOPY, GASTROSCOPY, DUODENOSCOPY (EGD), COMBINED N/A 5/18/2017    Procedure: COMBINED ESOPHAGOSCOPY, GASTROSCOPY, DUODENOSCOPY (EGD), BIOPSY SINGLE OR MULTIPLE;  Gastroscopy  ;  Surgeon: Jovanny Fuller MD;  Location: [facility]     SURGICAL HISTORY OF -       hernia times three-right inguinal     SURGICAL HISTORY OF -   1989    thyroidectomy        Family History   Problem Relation Age of Onset     Lipids Mother      Hypertension Father      Cancer Father      sweat gland cancer     Other - See Comments Father 77     IPF - ideomatic pulmonary fibrosis     Lipids Brother      Thyroid Disease Brother      Arthritis Maternal Grandfather      HEART DISEASE Paternal Grandmother      Melanoma No family hx of        Social History     Social History     Marital status:      Spouse name: Merline     Number of children: 2     Years of education: N/A     Occupational History      [employer]     Social History Main Topics     Smoking status: Never Smoker     Smokeless tobacco: Never Used     Alcohol use Yes      Comment: 2-3 drinks a week     Drug use: No     Sexual activity: Yes     Partners: Female     Birth control/ protection: Surgical     Other Topics Concern     Caffeine Concern Yes     1 coffee daily      Exercise Yes     walks & Running-3 x days a week     Seat Belt Yes     Self-Exams Yes     Parent/Sibling W/ Cabg, Mi Or Angioplasty Before 65f 55m? No     Social History Narrative       Outpatient Encounter Prescriptions as of 10/5/2018   Medication Sig Dispense Refill     calcium-vitamin D (CALCIUM 600 + D) 600-400 MG-UNIT per tablet Take 1 tablet by mouth 2 times daily. 100 tablet 3     levothyroxine (SYNTHROID) 112 MCG tablet Take 1 tablet (to make 162 mcg) 2 days a week alternating with taking 175 mcg 5 days per week. BRAND NAME- Do NOT SUBSTITUTE 8 tablet 3     levothyroxine (SYNTHROID) 50 MCG tablet Take 1 tablet (to make 162 mcg) 2 days a week alternating with taking 175 mcg 5 days per week. BRAND NAME- Do NOT SUBSTITUTE 24 tablet 3     Multiple Vitamins-Minerals (MULTIVITAMIN OR)        ORDER FOR DME Superfeet inserts 2 Device 0     RaNITidine HCl (RANITIDINE 75 PO) Take 75 mg by mouth daily       sildenafil (REVATIO) 20 MG tablet Take 1 tablet (20 mg) by mouth daily as needed 30 min to 4 hrs before sex. Do not use with nitroglycerin, terazosin or doxazosin. 30 tablet 3     simvastatin (ZOCOR) 20 MG tablet TAKE 1 TABLET BY MOUTH AT  BEDTIME 90 tablet 3     SYNTHROID 175 MCG tablet Take 1 tablet 5 days a week alternating with taking 162 mcg 2 days per week 60 tablet 3     loratadine (CLARITIN) 10 MG tablet Take 1 tablet (10 mg) by mouth daily (Patient not taking: Reported on 8/23/2018) 90 tablet 3     No facility-administered encounter medications on file as of 10/5/2018.              Review Of Systems  Skin: As above  Eyes: negative  Ears/Nose/Throat: negative  Respiratory: No shortness of breath, dyspnea on exertion, cough, or hemoptysis  Cardiovascular: negative  Gastrointestinal: negative  Genitourinary: negative  Musculoskeletal: negative  Neurologic: negative  Psychiatric: negative  Hematologic/Lymphatic/Immunologic: negative  Endocrine: negative      O:   NAD, WDWN, Alert & Oriented, Mood & Affect wnl,  Vitals stable   Here today alone   /83  Pulse 85  SpO2 98%   General appearance normal   Vitals stable   Alert, oriented and in no acute distress      Brown macules on face, torso and extremities   Brown stuck on papules on torso and extremities   Red papules on torso   1.6 cm pink scaly thin plaque on left forearm    Pink gritty papules on right  hand x 2        The remainder of the detailed exam was unremarkable; the following areas were examined:  scalp/hair, conjunctiva/lids, face, neck, lips/teeth, oral mucosa/gingiva, chest, back, abdomen, buttocks, digits/nails, RUE, LUE, RLE, LLE.      Eyes: Conjunctivae/lids:Normal     ENT: Lips: normal    MSK:Normal    Cardiovascular: peripheral edema none    Pulm: Breathing Normal    Neuro/Psych: Orientation:Normal; Mood/Affect:Normal  A/P:  1. R/O dermatitis vs ak vs scis vs isk on left forearm   TANGENTIAL BIOPSY SENT OUT:  After consent, anesthesia with LEC and prep, tangential excision performed and specimen sent out for permanent section histology.  No complications and routine wound care. Patient told to call our office in 1-2 weeks for result.      2. Actinic keratosis x 2 on right hand   LN2:  Treated with LN2 for 5s for 1-2 cycles. Warned risks of blistering, pain, pigment change, scarring, and incomplete resolution.  Advised patient to return if lesions do not completely resolve.  Wound care sheet given.  3. Lentigo, ephelides, angioma, seborrheic keratosis   BENIGN LESIONS DISCUSSED WITH PATIENT:  I discussed the specifics of tumor, prognosis, and genetics of benign lesions.  I explained that treatment of these lesions would be purely cosmetic and not medically neccessary.  I discussed with patient different removal options including excision, cautery and /or laser.      Nature and genetics of benign skin lesions dicussed with patient.  Signs and Symptoms of skin cancer discussed with patient.  ABCDEs of melanoma reviewed with patient.  Patient  encouraged to perform monthly skin exams.  UV precautions reviewed with patient.  Risks of non-melanoma skin cancer discussed with patient   Return to clinic in one year or sooner if needed.     Patient to follow up with Primary Care provider regarding elevated blood pressure.

## 2018-10-05 NOTE — MR AVS SNAPSHOT
After Visit Summary   10/5/2018    Marcelo Hawkins    MRN: [id number]           Patient Information     Date Of Birth          1965        Visit Information        Provider Department      10/5/2018 7:20 AM Stefani Kaye PA-C [facility]        Today's Diagnoses     Neoplasm of uncertain behavior of skin    -  1      Care Instructions          Wound Care Instructions     FOR SUPERFICIAL WOUNDS     [facility] [phone]    [facility] [phone]                       AFTER 24 HOURS YOU SHOULD REMOVE THE BANDAGE AND BEGIN DAILY DRESSING CHANGES AS FOLLOWS:     1) Remove Dressing.     2) Clean and dry the area with tap water using a Q-tip or sterile gauze pad.     3) Apply Vaseline, Aquaphor, Polysporin ointment or Bacitracin ointment over entire wound.  Do NOT use Neosporin ointment.     4) Cover the wound with a band-aid, or a sterile non-stick gauze pad and micropore paper tape      REPEAT THESE INSTRUCTIONS AT LEAST ONCE A DAY UNTIL THE WOUND HAS COMPLETELY HEALED.    It is an old wives tale that a wound heals better when it is exposed to air and allowed to dry out. The wound will heal faster with a better cosmetic result if it is kept moist with ointment and covered with a bandage.    **Do not let the wound dry out.**      Supplies Needed:      *Cotton tipped applicators (Q-tips)    *Polysporin Ointment or Bacitracin Ointment (NOT NEOSPORIN)    *Band-aids or non-stick gauze pads and micropore paper tape.      PATIENT INFORMATION:    During the healing process you will notice a number of changes. All wounds develop a small halo of redness surrounding the wound.  This means healing is occurring. Severe itching with extensive redness usually indicates sensitivity to the ointment or bandage tape used to dress the wound.  You should call our office if this develops.      Swelling  and/or discoloration around your surgical site is common, particularly  when performed around the eye.    All wounds normally drain.  The larger the wound the more drainage there will be.  After 7-10 days, you will notice the wound beginning to shrink and new skin will begin to grow.  The wound is healed when you can see skin has formed over the entire area.  A healed wound has a healthy, shiny look to the surface and is red to dark pink in color to normalize.  Wounds may take approximately 4-6 weeks to heal.  Larger wounds may take 6-8 weeks.  After the wound is healed you may discontinue dressing changes.    You may experience a sensation of tightness as your wound heals. This is normal and will gradually subside.    Your healed wound may be sensitive to temperature changes. This sensitivity improves with time, but if you re having a lot of discomfort, try to avoid temperature extremes.    Patients frequently experience itching after their wound appears to have healed because of the continue healing under the skin.  Plain Vaseline will help relieve the itching.        POSSIBLE COMPLICATIONS    BLEEDIN. Leave the bandage in place.  2. Use tightly rolled up gauze or a cloth to apply direct pressure over the bandage for 30  minutes.  3. Reapply pressure for an additional 30 minutes if necessary  4. Use additional gauze and tape to maintain pressure once the bleeding has stopped.    WOUND CARE INSTRUCTIONS   FOR CRYOSURGERY   This area treated with liquid nitrogen should form a blister (areas treated may or may not blister-skin may just turn dark and slough off). You do not need to bandage the area unless a blister forms and breaks (which may be a few days). When the blister breaks, begin daily dressing changes as follows:  1) Clean and dry the area with tap water using clean Q-tip or sterile gauze pad.   2) Apply Polysporin ointment or Bacitracin ointment over entire wound. Do NOT use Neosporin ointment.   3) Cover the wound with a band-aid or sterile non-stick gauze pad and  micropore paper tape.   REPEAT THESE INSTRUCTIONS AT LEAST ONCE A DAY UNTIL THE WOUND HAS COMPLETELY HEALED.   It is an old wives tale that a wound heals better when it is exposed to air and allowed to dry out. The wound will heal faster with a better cosmetic result if it is kept moist with ointment and covered with a bandage.   Do not let the wound dry out.   IMPORTANT INFORMATION ON REVERSE SIDE   Supplies Needed:   *Cotton tipped applicators (Q-tips)   *Polysporin ointment or Bacitracin ointment (NOT NEOSPORIN)   *Band-aids, or non stick gauze pads and micropore paper tape   PATIENT INFORMATION   During the healing process you will notice a number of changes. All wounds develop a small halo of redness surrounding the wound. This means healing is occurring. Severe itching with extensive redness usually indicates sensitivity to the ointment or bandage tape used to dress the wound. You should call our office if this develops.   Swelling and/or discoloration around your surgical site is common, particularly when performed around the eye.   All wounds normally drain. The larger the wound the more drainage there will be. After 7-10 days, you will notice the wound beginning to shrink and new skin will begin to grow. The wound is healed when you can see skin has formed over the entire area. A healed wound has a healthy, shiny look to the surface and is red to dark pink in color to normalize. Wounds may take approximately 4-6 weeks to heal. Larger wounds may take 6-8 weeks. After the wound is healed you may discontinue dressing changes.   You may experience a sensation of tightness as your wound heals. This is normal and will gradually subside.   Your healed wound may be sensitive to temperature changes. This sensitivity improves with time, but if you re having a lot of discomfort, try to avoid temperature extremes.   Patients frequently experience itching after their wound appears to have healed because of the continue  healing under the skin. Plain Vaseline will help relieve the itching.                 Follow-ups after your visit        Who to contact     If you have questions or need follow up information about today's clinic visit or your schedule please contact [facility] directly at [phone].  Normal or non-critical lab and imaging results will be communicated to you by MyChart, letter or phone within 4 business days after the clinic has received the results. If you do not hear from us within 7 days, please contact the clinic through MyChart or phone. If you have a critical or abnormal lab result, we will notify you by phone as soon as possible.  Submit refill requests through [employer] or call your pharmacy and they will forward the refill request to us. Please allow 3 business days for your refill to be completed.          Additional Information About Your Visit        MyChart Information     [employer] gives you secure access to your electronic health record. If you see a primary care provider, you can also send messages to your care team and make appointments. If you have questions, please call your primary care clinic.  If you do not have a primary care provider, please call [phone] and they will assist you.        Care EveryWhere ID     This is your Care EveryWhere ID. This could be used by other organizations to access your [place] medical records  [id number]        Your Vitals Were     Pulse Pulse Oximetry                85 98%           Blood Pressure from Last 3 Encounters:   10/05/18 141/83   08/23/18 130/82   04/19/18 132/84    Weight from Last 3 Encounters:   08/23/18 82.1 kg (181 lb)   04/19/18 76.7 kg (169 lb)   11/02/17 79.8 kg (176 lb)              We Performed the Following     Dermatological path order and indications        Primary Care Provider Office Phone # Fax #    MATTHEW Landry [phone] [address] [facility] [address]        Equal Access to  Services     [facility]: Hadii aad ku hadvickstephon Weiner, waaxda luqadaha, qaybta kaalmazackary bowles . So [facility] [phone].    ATENCIÓN: Si haley augustine, tiene a bhatti disposición servicios gratuitos de asistencia lingüística. Llame al [phone].    We comply with applicable federal civil rights laws and [place] laws. We do not discriminate on the basis of race, color, national origin, age, disability, sex, sexual orientation, or gender identity.            Thank you!     Thank you for choosing [facility]  for your care. Our goal is always to provide you with excellent care. Hearing back from our patients is one way we can continue to improve our services. Please take a few minutes to complete the written survey that you may receive in the mail after your visit with us. Thank you!             Your Updated Medication List - Protect others around you: Learn how to safely use, store and throw away your medicines at www.disposemymeds.org.          This list is accurate as of 10/5/18  7:32 AM.  Always use your most recent med list.                   Brand Name Dispense Instructions for use Diagnosis    calcium 600 + D 600-400 MG-UNIT per tablet   Generic drug:  calcium carbonate 600 mg-vitamin D 400 units     100 tablet    Take 1 tablet by mouth 2 times daily.        * levothyroxine 112 MCG tablet    SYNTHROID    8 tablet    Take 1 tablet (to make 162 mcg) 2 days a week alternating with taking 175 mcg 5 days per week. BRAND NAME- Do NOT SUBSTITUTE    Papillary carcinoma of thyroid (H), Postsurgical hypothyroidism       * levothyroxine 50 MCG tablet    SYNTHROID    24 tablet    Take 1 tablet (to make 162 mcg) 2 days a week alternating with taking 175 mcg 5 days per week. BRAND NAME- Do NOT SUBSTITUTE    Papillary carcinoma of thyroid (H), Postsurgical hypothyroidism       * SYNTHROID 175 MCG tablet   Generic drug:  levothyroxine     60 tablet    Take 1 tablet 5  days a week alternating with taking 162 mcg 2 days per week    Papillary carcinoma of thyroid (H), Postsurgical hypothyroidism       loratadine 10 MG tablet    CLARITIN    90 tablet    Take 1 tablet (10 mg) by mouth daily    H/O seasonal allergies       MULTIVITAMIN PO           order for DME     2 Device    Superfeet inserts    Left foot pain, Neuroma, Faust's, left       RANITIDINE 75 PO      Take 75 mg by mouth daily        sildenafil 20 MG tablet    REVATIO    30 tablet    Take 1 tablet (20 mg) by mouth daily as needed 30 min to 4 hrs before sex. Do not use with nitroglycerin, terazosin or doxazosin.    Other male erectile dysfunction       simvastatin 20 MG tablet    ZOCOR    90 tablet    TAKE 1 TABLET BY MOUTH AT  BEDTIME    Routine general medical examination at a health care facility, Hyperlipidemia LDL goal <130       * Notice:  This list has 3 medication(s) that are the same as other medications prescribed for you. Read the directions carefully, and ask your doctor or other care provider to review them with you.

## 2018-10-05 NOTE — PATIENT INSTRUCTIONS
Wound Care Instructions     FOR SUPERFICIAL WOUNDS     [facility] [phone]    [facility] [phone]                       AFTER 24 HOURS YOU SHOULD REMOVE THE BANDAGE AND BEGIN DAILY DRESSING CHANGES AS FOLLOWS:     1) Remove Dressing.     2) Clean and dry the area with tap water using a Q-tip or sterile gauze pad.     3) Apply Vaseline, Aquaphor, Polysporin ointment or Bacitracin ointment over entire wound.  Do NOT use Neosporin ointment.     4) Cover the wound with a band-aid, or a sterile non-stick gauze pad and micropore paper tape      REPEAT THESE INSTRUCTIONS AT LEAST ONCE A DAY UNTIL THE WOUND HAS COMPLETELY HEALED.    It is an old wives tale that a wound heals better when it is exposed to air and allowed to dry out. The wound will heal faster with a better cosmetic result if it is kept moist with ointment and covered with a bandage.    **Do not let the wound dry out.**      Supplies Needed:      *Cotton tipped applicators (Q-tips)    *Polysporin Ointment or Bacitracin Ointment (NOT NEOSPORIN)    *Band-aids or non-stick gauze pads and micropore paper tape.      PATIENT INFORMATION:    During the healing process you will notice a number of changes. All wounds develop a small halo of redness surrounding the wound.  This means healing is occurring. Severe itching with extensive redness usually indicates sensitivity to the ointment or bandage tape used to dress the wound.  You should call our office if this develops.      Swelling  and/or discoloration around your surgical site is common, particularly when performed around the eye.    All wounds normally drain.  The larger the wound the more drainage there will be.  After 7-10 days, you will notice the wound beginning to shrink and new skin will begin to grow.  The wound is healed when you can see skin has formed over the entire area.  A healed wound has a healthy, shiny look to the surface and is red to dark pink in color  to normalize.  Wounds may take approximately 4-6 weeks to heal.  Larger wounds may take 6-8 weeks.  After the wound is healed you may discontinue dressing changes.    You may experience a sensation of tightness as your wound heals. This is normal and will gradually subside.    Your healed wound may be sensitive to temperature changes. This sensitivity improves with time, but if you re having a lot of discomfort, try to avoid temperature extremes.    Patients frequently experience itching after their wound appears to have healed because of the continue healing under the skin.  Plain Vaseline will help relieve the itching.        POSSIBLE COMPLICATIONS    BLEEDIN. Leave the bandage in place.  2. Use tightly rolled up gauze or a cloth to apply direct pressure over the bandage for 30  minutes.  3. Reapply pressure for an additional 30 minutes if necessary  4. Use additional gauze and tape to maintain pressure once the bleeding has stopped.    WOUND CARE INSTRUCTIONS   FOR CRYOSURGERY   This area treated with liquid nitrogen should form a blister (areas treated may or may not blister-skin may just turn dark and slough off). You do not need to bandage the area unless a blister forms and breaks (which may be a few days). When the blister breaks, begin daily dressing changes as follows:  1) Clean and dry the area with tap water using clean Q-tip or sterile gauze pad.   2) Apply Polysporin ointment or Bacitracin ointment over entire wound. Do NOT use Neosporin ointment.   3) Cover the wound with a band-aid or sterile non-stick gauze pad and micropore paper tape.   REPEAT THESE INSTRUCTIONS AT LEAST ONCE A DAY UNTIL THE WOUND HAS COMPLETELY HEALED.   It is an old wives tale that a wound heals better when it is exposed to air and allowed to dry out. The wound will heal faster with a better cosmetic result if it is kept moist with ointment and covered with a bandage.   Do not let the wound dry out.   IMPORTANT  INFORMATION ON REVERSE SIDE   Supplies Needed:   *Cotton tipped applicators (Q-tips)   *Polysporin ointment or Bacitracin ointment (NOT NEOSPORIN)   *Band-aids, or non stick gauze pads and micropore paper tape   PATIENT INFORMATION   During the healing process you will notice a number of changes. All wounds develop a small halo of redness surrounding the wound. This means healing is occurring. Severe itching with extensive redness usually indicates sensitivity to the ointment or bandage tape used to dress the wound. You should call our office if this develops.   Swelling and/or discoloration around your surgical site is common, particularly when performed around the eye.   All wounds normally drain. The larger the wound the more drainage there will be. After 7-10 days, you will notice the wound beginning to shrink and new skin will begin to grow. The wound is healed when you can see skin has formed over the entire area. A healed wound has a healthy, shiny look to the surface and is red to dark pink in color to normalize. Wounds may take approximately 4-6 weeks to heal. Larger wounds may take 6-8 weeks. After the wound is healed you may discontinue dressing changes.   You may experience a sensation of tightness as your wound heals. This is normal and will gradually subside.   Your healed wound may be sensitive to temperature changes. This sensitivity improves with time, but if you re having a lot of discomfort, try to avoid temperature extremes.   Patients frequently experience itching after their wound appears to have healed because of the continue healing under the skin. Plain Vaseline will help relieve the itching.

## 2018-10-05 NOTE — NURSING NOTE
Initial /83  Pulse 85  SpO2 98% Estimated body mass index is 24.55 kg/(m^2) as calculated from the following:    Height as of 8/23/18: 1.829 m (6').    Weight as of 8/23/18: 82.1 kg (181 lb). .

## 2018-10-05 NOTE — LETTER
[facility]  [address]  [facility] [place] [address]  Phone: [phone]    October 9, 2018    Marcelo Hawkins                                                                                                                   [address] [address] [place] [id number]            Dear Mr. Hawkins,    You are scheduled for Mohs Surgery on Monday November 26 th at 7:30 am.    Please check in at Dermatology Clinic.   (2nd Floor, last  Clinic on right up staircase or elevator -past OB/GYN clinic)    You don't need to arrive more than 5-10 minutes prior to your appointment time.     Be sure to eat a good breakfast and bathe and wash your hair prior to Surgery.    If you are taking any anti-coagulants that are prescribed by your Doctor (such as Coumadin/warfarin, Plavix, Aspirin, Ibuprofen), please continue taking them.     However, If you are taking anti-coagulants over the counter without  a Doctor's order for a Medical condition, please discontinue them 10 days prior to Surgery.      Please wear loose comfortable clothing as it could possibly be 4-6 hours until your surgery is completed depending upon how many layers of tissue need to be removed.     Wi-fi access is available.     Thank you,      Brandt Varela MD/ [facility]

## 2018-10-09 LAB — COPATH REPORT: NORMAL

## 2018-10-15 ENCOUNTER — TELEPHONE (OUTPATIENT)
Dept: DERMATOLOGY | Facility: CLINIC | Age: 53
End: 2018-10-15

## 2018-10-15 NOTE — TELEPHONE ENCOUNTER
Unable to reach patient. Message left that requested medication (Valium) is given in clinic on day of procedure and patient does need a  if he wants to be given this medication. Call if questions.  Gisell Baker RN

## 2018-10-15 NOTE — TELEPHONE ENCOUNTER
Reason for Call:  Other medication      Detailed comments:Pt has Mohs surgery 11/26 , wants Valium before surgery, please send to [facility] in [place]    Phone Number Patient can be reached at: Home number on file [phone] (home)    Best Time: today    Can we leave a detailed message on this number? YES    Call taken on 10/15/2018 at 8:38 AM by Rabia Herr

## 2018-11-04 ENCOUNTER — TELEPHONE (OUTPATIENT)
Dept: FAMILY MEDICINE | Facility: CLINIC | Age: 53
End: 2018-11-04

## 2018-11-05 NOTE — TELEPHONE ENCOUNTER
Patient called back and says he is taking OTC Zantac 75 mg daily and is not taking the Omeprazole anymore. Removed request, stating reason: Other, taking the Zantac instead.    ASCENCION Mcnulty

## 2018-11-05 NOTE — TELEPHONE ENCOUNTER
Left message for patient to return call to clinic.  What med is he taking for GERD?  Omeprazole or ranitidine?  This request is for omeprazole which is no longer active on his med list.  Ranitidine is on med list however historical.  LOV notes state he is to continue H2 blocker.  India ONEILL RN

## 2018-11-05 NOTE — TELEPHONE ENCOUNTER
"Requested Prescriptions   Pending Prescriptions Disp Refills     omeprazole (PRILOSEC) 20 MG CR capsule [Pharmacy Med Name: OMEPRAZOLE  20MG  CAP] 90 capsule      Sig: TAKE 1 CAPSULE BY MOUTH  DAILY    PPI Protocol Passed    11/4/2018  3:33 AM       Passed - Not on Clopidogrel (unless Pantoprazole ordered)       Passed - No diagnosis of osteoporosis on record       Passed - Recent (12 mo) or future (30 days) visit within the authorizing provider's specialty    Patient had office visit in the last 12 months or has a visit in the next 30 days with authorizing provider or within the authorizing provider's specialty.  See \"Patient Info\" tab in inbasket, or \"Choose Columns\" in Meds & Orders section of the refill encounter.             Passed - Patient is age 18 or older          "

## 2018-11-06 ENCOUNTER — OFFICE VISIT (OUTPATIENT)
Dept: DERMATOLOGY | Facility: CLINIC | Age: 53
End: 2018-11-06
Payer: COMMERCIAL

## 2018-11-06 VITALS — OXYGEN SATURATION: 97 % | SYSTOLIC BLOOD PRESSURE: 134 MMHG | DIASTOLIC BLOOD PRESSURE: 84 MMHG | HEART RATE: 73 BPM

## 2018-11-06 DIAGNOSIS — D04.62 SQUAMOUS CELL CARCINOMA IN SITU (SCCIS) OF SKIN OF LEFT FOREARM: ICD-10-CM

## 2018-11-06 DIAGNOSIS — Z85.828 HISTORY OF SKIN CANCER: Primary | ICD-10-CM

## 2018-11-06 PROCEDURE — 17313 MOHS 1 STAGE T/A/L: CPT | Performed by: DERMATOLOGY

## 2018-11-06 RX ORDER — DIAZEPAM 10 MG
10 TABLET ORAL EVERY 6 HOURS PRN
Qty: 1 TABLET | Refills: 0 | Status: SHIPPED | OUTPATIENT
Start: 2018-11-06 | End: 2018-12-06

## 2018-11-06 NOTE — NURSING NOTE
Initial /84 (BP Location: Left arm, Patient Position: Sitting, Cuff Size: Adult Regular)  Pulse 73  SpO2 97% Estimated body mass index is 24.55 kg/(m^2) as calculated from the following:    Height as of 8/23/18: 1.829 m (6').    Weight as of 8/23/18: 82.1 kg (181 lb). .

## 2018-11-06 NOTE — NURSING NOTE
The following medication was given:     MEDICATION: Diazepam  ROUTE: PO  TIME: 0820  DOSE: 5 mg  AMOUNT: 2 tablets  LOT #: [id number]  :  [facility]  EXPIRATION DATE:  2.1.2020  NDC: [phone]    Karina Villarreal LPN.................11/6/2018

## 2018-11-06 NOTE — MR AVS SNAPSHOT
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 No strenuous activity for 48 hours    ? Take Tylenol as needed for discomfort.                                                .         ? Do not drink alcoholic beverages for 48 hours.    ? Keep the pressure bandage in place for 24 hours. If the bandage becomes blood tinged or loose, reinforce it with gauze and tape.        (Refer to the reverse side of this page for management of bleeding).    ? Remove bandage in 24 hours and begin wound care as follows:     1. Clean area with tap water using a Q tip or gauze pad, (shower / bathe normally)  2. Dry wound with Q tip or gauze pad  3. Apply Aquaphor, Vaseline, Polysporin or Bacitracin Ointment with a Q tip    Do NOT use Neosporin Ointment *  4. Cover the wound with a band-aid or nonstick gauze pad and paper tape.  5. Repeat wound care once a day until wound is completely healed.    It is an old wives tale that a wound heals better when it is exposed to air and allowed to dry out. The wound will heal faster with a better cosmetic result if it is kept moist with ointment and covered with a bandage.  Do not let the wound dry out.      Supplies Needed:                Qtips or gauze pads                Polysporin or Bacitracin Ointment                Bandaids or nonstick gauze pads and paper tape    Wound care kits and brown paper tape are available for purchase at   the pharmacy.    BLEEDIN. Use tightly rolled up gauze or cloth to apply direct pressure over the bandage for 20    minutes.  2. Reapply pressure for an additional 20 minutes if necessary  3. Call the office or go to the nearest emergency room if pressure fails to stop the bleeding.  4. Use additional gauze and tape to maintain pressure once the bleeding has stopped.  5. Begin wound care 24 hours after surgery as directed.                  WOUND HEALING    1. One week after surgery a pink / red halo will form around the outside of the wound.   This is new skin.  2. The center of the wound will appear yellowish white and produce some drainage.  3. The pink halo will slowly migrate in toward the center of the wound until the wound is covered with new shiny pink skin.  4. There will be no more drainage when the wound is completely healed.  5. It will take six months to one year for the redness to fade.  6. The scar may be itchy, tight and sensitive to extreme temperatures for a year after the surgery.  7. Massaging the area several times a day for several minutes after the wound is completely healed will help the scar soften and normalize faster. Begin massage only after healing is complete.      In case of emergency call: Dr Varela: [phone]     [facility]: [phone]    [facility]: [phone]              Follow-ups after your visit        Who to contact     If you have questions or need follow up information about today's clinic visit or your schedule please contact [facility] directly at [phone].  Normal or non-critical lab and imaging results will be communicated to you by MyChart, letter or phone within 4 business days after the clinic has received the results. If you do not hear from us within 7 days, please contact the clinic through MyChart or phone. If you have a critical or abnormal lab result, we will notify you by phone as soon as possible.  Submit refill requests through [employer] or call your pharmacy and they will forward the refill request to us. Please allow 3 business  days for your refill to be completed.          Additional Information About Your Visit        MyChart Information     [facility] gives you secure access to your electronic health record. If you see a primary care provider, you can also send messages to your care team and make appointments. If you have questions, please call your primary care clinic.  If you do not have a primary care provider, please call [phone] and they will assist you.        Care EveryWhere ID     This is your Care EveryWhere ID. This could be used by other organizations to access your [place] medical records  [id number]        Your Vitals Were     Pulse Pulse Oximetry                73 97%           Blood Pressure from Last 3 Encounters:   11/06/18 134/84   10/05/18 141/83   08/23/18 130/82    Weight from Last 3 Encounters:   08/23/18 82.1 kg (181 lb)   04/19/18 76.7 kg (169 lb)   11/02/17 79.8 kg (176 lb)              We Performed the Following     MOHS TRUNK/ARM/LEG 1ST STAGE UP T0 5 BLOCKS          Today's Medication Changes          These changes are accurate as of 11/6/18  9:42 AM.  If you have any questions, ask your nurse or doctor.               Start taking these medicines.        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  Do not operate a vehicle after taking this medication.   Quantity:  1 tablet   Refills:  0            Where to get your medicines      Some of these will need a paper prescription and others can be bought over the counter.  Ask your nurse if you have questions.     Bring a paper prescription for each of these medications     diazepam 10 MG tablet                Primary Care Provider Office Phone # Fax #    Maya Aiken  Pita, APRN [phone]       [address] [facility] [address]        Equal Access to Services     MARCIAL MONTES DE OCA : Hadii aad ku hadvickstephon Weiner, wabeda elvaconnieha, robbinta kabrooksda onesimo, zackary annmariein hayaagonzález syedsascha fernandez chloé brooks. So [facility] [phone].    ATENCIÓN: Si habla español, tiene a bhatti disposición servicios gratuitos de asistencia lingüística. Llame al [phone].    We comply with applicable federal civil rights laws and [place] laws. We do not discriminate on the basis of race, color, national origin, age, disability, sex, sexual orientation, or gender identity.            Thank you!     Thank you for choosing [facility]  for your care. Our goal is always to provide you with excellent care. Hearing back from our patients is one way we can continue to improve our services. Please take a few minutes to complete the written survey that you may receive in the mail after your visit with us. Thank you!             Your Updated Medication List - Protect others around you: Learn how to safely use, store and throw away your medicines at www.disposemymeds.org.          This list is accurate as of 11/6/18  9:42 AM.  Always use your most recent med list.                   Brand Name Dispense Instructions for use Diagnosis    calcium 600 + D 600-400 MG-UNIT per tablet   Generic drug:  calcium carbonate 600 mg-vitamin D 400 units     100 tablet    Take 1 tablet by mouth 2 times daily.        diazepam 10 MG tablet    VALIUM    1 tablet    Take 1 tablet (10 mg) by mouth every 6 hours as needed for anxiety Take 30-60 minutes before procedure.  Do not operate a vehicle after taking this medication.    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 BRAND NAME- Do NOT SUBSTITUTE     Papillary carcinoma of thyroid (H), Postsurgical hypothyroidism       * levothyroxine 50 MCG tablet    SYNTHROID    24 tablet    Take 1 tablet (to make 162 mcg) 2 days a week alternating with taking 175 mcg 5 days per week. BRAND NAME- Do NOT SUBSTITUTE    Papillary carcinoma of thyroid (H), Postsurgical hypothyroidism       * SYNTHROID 175 MCG tablet   Generic drug:  levothyroxine     60 tablet    Take 1 tablet 5 days a week alternating with taking 162 mcg 2 days per week    Papillary carcinoma of thyroid (H), Postsurgical hypothyroidism       loratadine 10 MG tablet    CLARITIN    90 tablet    Take 1 tablet (10 mg) by mouth daily    H/O seasonal allergies       MULTIVITAMIN PO           order for DME     2 Device    Superfeet inserts    Left foot pain, Neuroma, Faust's, left       RANITIDINE 75 PO      Take 75 mg by mouth daily        sildenafil 20 MG tablet    REVATIO    30 tablet    Take 1 tablet (20 mg) by mouth daily as needed 30 min to 4 hrs before sex. Do not use with nitroglycerin, terazosin or doxazosin.    Other male erectile dysfunction       simvastatin 20 MG tablet    ZOCOR    90 tablet    TAKE 1 TABLET BY MOUTH AT  BEDTIME    Routine general medical examination at a health care facility, Hyperlipidemia LDL goal <130       * Notice:  This list has 3 medication(s) that are the same as other medications prescribed for you. Read the directions carefully, and ask your doctor or other care provider to review them with you.

## 2018-11-06 NOTE — PATIENT INSTRUCTIONS
Open Wound Care     for __left arm____________        ? No strenuous activity for 48 hours    ? Take Tylenol as needed for discomfort.                                                .         ? Do not drink alcoholic beverages for 48 hours.    ? Keep the pressure bandage in place for 24 hours. If the bandage becomes blood tinged or loose, reinforce it with gauze and tape.        (Refer to the reverse side of this page for management of bleeding).    ? Remove bandage in 24 hours and begin wound care as follows:     1. Clean area with tap water using a Q tip or gauze pad, (shower / bathe normally)  2. Dry wound with Q tip or gauze pad  3. Apply Aquaphor, Vaseline, Polysporin or Bacitracin Ointment with a Q tip    Do NOT use Neosporin Ointment *  4. Cover the wound with a band-aid or nonstick gauze pad and paper tape.  5. Repeat wound care once a day until wound is completely healed.    It is an old wives tale that a wound heals better when it is exposed to air and allowed to dry out. The wound will heal faster with a better cosmetic result if it is kept moist with ointment and covered with a bandage.  Do not let the wound dry out.      Supplies Needed:                Qtips or gauze pads                Polysporin or Bacitracin Ointment                Bandaids or nonstick gauze pads and paper tape    Wound care kits and brown paper tape are available for purchase at   the pharmacy.    BLEEDIN. Use tightly rolled up gauze or cloth to apply direct pressure over the bandage for 20   minutes.  2. Reapply pressure for an additional 20 minutes if necessary  3. Call the office or go to the nearest emergency room if pressure fails to stop the bleeding.  4. Use additional gauze and tape to maintain pressure once the bleeding has stopped.  5. Begin wound care 24 hours after surgery as directed.                  WOUND HEALING    1. One week after surgery a pink / red halo will form around the outside of the wound.   This is  new skin.  2. The center of the wound will appear yellowish white and produce some drainage.  3. The pink halo will slowly migrate in toward the center of the wound until the wound is covered with new shiny pink skin.  4. There will be no more drainage when the wound is completely healed.  5. It will take six months to one year for the redness to fade.  6. The scar may be itchy, tight and sensitive to extreme temperatures for a year after the surgery.  7. Massaging the area several times a day for several minutes after the wound is completely healed will help the scar soften and normalize faster. Begin massage only after healing is complete.      In case of emergency call: Dr Varela: [phone]     [facility]: [phone]    [facility]: [phone]

## 2018-11-06 NOTE — LETTER
11/6/2018         RE: Marcelo Hawkins  [address] [address] [place] [id number]        Dear Colleague,    Thank you for referring your patient, Marcelo Hawkins, to the [facility]. Please see a copy of my visit note below.    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   .  Patient states this has been present for a while.  Patient reports the following symptoms:  crusting.  Patient reports the following previous treatments cryo.  Patient reports the following modifying factors none.  Associated symptoms: none.  Patient has no other skin complaints today.  Remainder of the HPI, Meds, PMH, Allergies, FH, and SH was reviewed in chart.      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, residual hypothyroidism     Other and unspecified hyperlipidemia      Postsurgical hypothyroidism     s/p thryoidectomy      Squamous cell carcinoma        Past Surgical History:   Procedure Laterality Date     COLONOSCOPY N/A 3/17/2016    Procedure: COLONOSCOPY;  Surgeon: Jovanny Fuller MD;  Location: [facility]     ESOPHAGOSCOPY, GASTROSCOPY, DUODENOSCOPY (EGD), COMBINED N/A 5/18/2017    Procedure: COMBINED ESOPHAGOSCOPY, GASTROSCOPY, DUODENOSCOPY (EGD), BIOPSY SINGLE OR MULTIPLE;  Gastroscopy  ;  Surgeon: Jovanny Fuller MD;  Location: [facility]     SURGICAL HISTORY OF -       hernia times three-right inguinal     SURGICAL HISTORY OF -   1989    thyroidectomy        Family History   Problem Relation Age of Onset     Lipids Mother      Hypertension Father      Cancer Father      sweat gland cancer     Other - See Comments Father 77     IPF - ideomatic pulmonary fibrosis     Lipids Brother      Thyroid Disease Brother      Arthritis Maternal Grandfather      HEART DISEASE Paternal Grandmother      Melanoma No family hx of        Social History     Social History     Marital status:       Spouse name: Merline     Number of children: 2     Years of education: N/A     Occupational History      [employer]     Social History Main Topics     Smoking status: Never Smoker     Smokeless tobacco: Never Used     Alcohol use Yes      Comment: 2-3 drinks a week     Drug use: No     Sexual activity: Yes     Partners: Female     Birth control/ protection: Surgical     Other Topics Concern     Caffeine Concern Yes     1 coffee daily     Exercise Yes     walks & Running-3 x days a week     Seat Belt Yes     Self-Exams Yes     Parent/Sibling W/ Cabg, Mi Or Angioplasty Before 65f 55m? No     Social History Narrative       Outpatient Encounter Prescriptions as of 11/6/2018   Medication Sig Dispense Refill     calcium-vitamin D (CALCIUM 600 + D) 600-400 MG-UNIT per tablet Take 1 tablet by mouth 2 times daily. 100 tablet 3     diazepam (VALIUM) 10 MG tablet Take 1 tablet (10 mg) by mouth every 6 hours as needed for anxiety Take 30-60 minutes before procedure.  Do not operate a vehicle after taking this medication. 1 tablet 0     levothyroxine (SYNTHROID) 112 MCG tablet Take 1 tablet (to make 162 mcg) 2 days a week alternating with taking 175 mcg 5 days per week. BRAND NAME- Do NOT SUBSTITUTE 8 tablet 3     levothyroxine (SYNTHROID) 50 MCG tablet Take 1 tablet (to make 162 mcg) 2 days a week alternating with taking 175 mcg 5 days per week. BRAND NAME- Do NOT SUBSTITUTE 24 tablet 3     loratadine (CLARITIN) 10 MG tablet Take 1 tablet (10 mg) by mouth daily 90 tablet 3     Multiple Vitamins-Minerals (MULTIVITAMIN OR)        ORDER FOR DME Superfeet inserts 2 Device 0     RaNITidine HCl (RANITIDINE 75 PO) Take 75 mg by mouth daily       sildenafil (REVATIO) 20 MG tablet Take 1 tablet (20 mg) by mouth daily as needed 30 min to 4 hrs before sex. Do not use with nitroglycerin, terazosin or doxazosin. 30 tablet 3     simvastatin (ZOCOR) 20 MG tablet TAKE 1 TABLET BY MOUTH AT  BEDTIME 90 tablet 3     SYNTHROID 175 MCG  tablet Take 1 tablet 5 days a week alternating with taking 162 mcg 2 days per week 60 tablet 3     No facility-administered encounter medications on file as of 11/6/2018.              Review Of Systems  Skin: As above  Eyes: negative  Ears/Nose/Throat: negative  Respiratory: No shortness of breath, dyspnea on exertion, cough, or hemoptysis  Cardiovascular: negative  Gastrointestinal: negative  Genitourinary: negative  Musculoskeletal: negative  Neurologic: negative  Psychiatric: negative  Hematologic/Lymphatic/Immunologic: negative  Endocrine: negative      O:   NAD, WDWN, Alert & Oriented, Mood & Affect wnl, Vitals stable   Here today alone   /84 (BP Location: Left arm, Patient Position: Sitting, Cuff Size: Adult Regular)  Pulse 73  SpO2 97%   General appearance normal   Vitals stable   Alert, oriented and in no acute distress      Following lymph nodes palpated: Occipital, Cervical, Supraclavicular , antecubital no lad   L forearm ill-deifned 1.6cmred scaly plaque       Eyes: Conjunctivae/lids:Normal     ENT: Lips, buccal mucosa, tongue: normal    MSK:Normal    Cardiovascular: peripheral edema none    Pulm: Breathing Normal    Lymph Nodes: No Head and Neck Lymphadenopathy     Neuro/Psych: Orientation:Normal; Mood/Affect:Normal      A/P:  1. Squamous cell carcinoma in situ forearm   MOHS:   Size    After PGACAC discussed with patient, decision for Mohs surgery was made. Indication for Mohs was Size. Patient confirmed the site with Dr. Varela.  After anesthesia with LEC, the tumor was excised using standard Mohs technique in 1 stages(s).  CLEAR MARGINS OBTAINED and Final defect size was 2.4 cm.       REPAIR SECOND INTENT: We discussed the options for wound management in full with the patient including risks/benefits/possible outcomes. Decision made to allow the wound to heal by second intention. EBL minimal; complications none; wound care routine.  The patient was discharged in good condition and will  return in one month or prn for wound evaluation.  BENIGN LESIONS DISCUSSED WITH PATIENT:  I discussed the specifics of tumor, prognosis, and genetics of benign lesions.  I explained that treatment of these lesions would be purely cosmetic and not medically neccessary.  I discussed with patient different removal options including excision, cautery and /or laser.      Nature and genetics of benign skin lesions dicussed with patient.  Signs and Symptoms of skin cancer discussed with patient.  ABCDEs of melanoma reviewed with patient.  Patient encouraged to perform monthly skin exams.  UV precautions reviewed with patient.  Patient to follow up with Primary Care provider regarding elevated blood pressure.  Skin care regimen reviewed with patient: Eliminate harsh soaps, i.e. Dial, zest, irsih spring; Mild soaps such as Cetaphil or Dove sensitive skin, avoid hot or cold showers, aggressive use of emollients including vanicream, cetaphil or cerave discussed with patient.    Risks of non-melanoma skin cancer discussed with patient   Return to clinic 6 months  Patient to follow up with Primary Care provider regarding elevated blood pressure.        Again, thank you for allowing me to participate in the care of your patient.        Sincerely,        Brandt Varela MD

## 2018-11-06 NOTE — NURSING NOTE
Surgical Office Location :   [facility]  [address] [place] [address]

## 2018-11-06 NOTE — PROGRESS NOTES
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   .  Patient states this has been present for a while.  Patient reports the following symptoms:  crusting.  Patient reports the following previous treatments cryo.  Patient reports the following modifying factors none.  Associated symptoms: none.  Patient has no other skin complaints today.  Remainder of the HPI, Meds, PMH, Allergies, FH, and SH was reviewed in chart.      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, residual hypothyroidism     Other and unspecified hyperlipidemia      Postsurgical hypothyroidism     s/p thryoidectomy      Squamous cell carcinoma        Past Surgical History:   Procedure Laterality Date     COLONOSCOPY N/A 3/17/2016    Procedure: COLONOSCOPY;  Surgeon: Jovanny Fuller MD;  Location: [facility]     ESOPHAGOSCOPY, GASTROSCOPY, DUODENOSCOPY (EGD), COMBINED N/A 5/18/2017    Procedure: COMBINED ESOPHAGOSCOPY, GASTROSCOPY, DUODENOSCOPY (EGD), BIOPSY SINGLE OR MULTIPLE;  Gastroscopy  ;  Surgeon: Jovanny Fuller MD;  Location: [facility]     SURGICAL HISTORY OF -       hernia times three-right inguinal     SURGICAL HISTORY OF -   1989    thyroidectomy        Family History   Problem Relation Age of Onset     Lipids Mother      Hypertension Father      Cancer Father      sweat gland cancer     Other - See Comments Father 77     IPF - ideomatic pulmonary fibrosis     Lipids Brother      Thyroid Disease Brother      Arthritis Maternal Grandfather      HEART DISEASE Paternal Grandmother      Melanoma No family hx of        Social History     Social History     Marital status:      Spouse name: Merline     Number of children: 2     Years of education: N/A     Occupational History      [employer]     Social History Main Topics     Smoking status: Never Smoker     Smokeless tobacco: Never Used     Alcohol  use Yes      Comment: 2-3 drinks a week     Drug use: No     Sexual activity: Yes     Partners: Female     Birth control/ protection: Surgical     Other Topics Concern     Caffeine Concern Yes     1 coffee daily     Exercise Yes     walks & Running-3 x days a week     Seat Belt Yes     Self-Exams Yes     Parent/Sibling W/ Cabg, Mi Or Angioplasty Before 65f 55m? No     Social History Narrative       Outpatient Encounter Prescriptions as of 11/6/2018   Medication Sig Dispense Refill     calcium-vitamin D (CALCIUM 600 + D) 600-400 MG-UNIT per tablet Take 1 tablet by mouth 2 times daily. 100 tablet 3     diazepam (VALIUM) 10 MG tablet Take 1 tablet (10 mg) by mouth every 6 hours as needed for anxiety Take 30-60 minutes before procedure.  Do not operate a vehicle after taking this medication. 1 tablet 0     levothyroxine (SYNTHROID) 112 MCG tablet Take 1 tablet (to make 162 mcg) 2 days a week alternating with taking 175 mcg 5 days per week. BRAND NAME- Do NOT SUBSTITUTE 8 tablet 3     levothyroxine (SYNTHROID) 50 MCG tablet Take 1 tablet (to make 162 mcg) 2 days a week alternating with taking 175 mcg 5 days per week. BRAND NAME- Do NOT SUBSTITUTE 24 tablet 3     loratadine (CLARITIN) 10 MG tablet Take 1 tablet (10 mg) by mouth daily 90 tablet 3     Multiple Vitamins-Minerals (MULTIVITAMIN OR)        ORDER FOR DME Superfeet inserts 2 Device 0     RaNITidine HCl (RANITIDINE 75 PO) Take 75 mg by mouth daily       sildenafil (REVATIO) 20 MG tablet Take 1 tablet (20 mg) by mouth daily as needed 30 min to 4 hrs before sex. Do not use with nitroglycerin, terazosin or doxazosin. 30 tablet 3     simvastatin (ZOCOR) 20 MG tablet TAKE 1 TABLET BY MOUTH AT  BEDTIME 90 tablet 3     SYNTHROID 175 MCG tablet Take 1 tablet 5 days a week alternating with taking 162 mcg 2 days per week 60 tablet 3     No facility-administered encounter medications on file as of 11/6/2018.              Review Of Systems  Skin: As above  Eyes:  negative  Ears/Nose/Throat: negative  Respiratory: No shortness of breath, dyspnea on exertion, cough, or hemoptysis  Cardiovascular: negative  Gastrointestinal: negative  Genitourinary: negative  Musculoskeletal: negative  Neurologic: negative  Psychiatric: negative  Hematologic/Lymphatic/Immunologic: negative  Endocrine: negative      O:   NAD, WDWN, Alert & Oriented, Mood & Affect wnl, Vitals stable   Here today alone   /84 (BP Location: Left arm, Patient Position: Sitting, Cuff Size: Adult Regular)  Pulse 73  SpO2 97%   General appearance normal   Vitals stable   Alert, oriented and in no acute distress      Following lymph nodes palpated: Occipital, Cervical, Supraclavicular , antecubital no lad   L forearm ill-deifned 1.6cmred scaly plaque       Eyes: Conjunctivae/lids:Normal     ENT: Lips, buccal mucosa, tongue: normal    MSK:Normal    Cardiovascular: peripheral edema none    Pulm: Breathing Normal    Lymph Nodes: No Head and Neck Lymphadenopathy     Neuro/Psych: Orientation:Normal; Mood/Affect:Normal      A/P:  1. Squamous cell carcinoma in situ forearm   MOHS:   Size    After PGACAC discussed with patient, decision for Mohs surgery was made. Indication for Mohs was Size. Patient confirmed the site with Dr. Varela.  After anesthesia with LEC, the tumor was excised using standard Mohs technique in 1 stages(s).  CLEAR MARGINS OBTAINED and Final defect size was 2.4 cm.       REPAIR SECOND INTENT: We discussed the options for wound management in full with the patient including risks/benefits/possible outcomes. Decision made to allow the wound to heal by second intention. EBL minimal; complications none; wound care routine.  The patient was discharged in good condition and will return in one month or prn for wound evaluation.  BENIGN LESIONS DISCUSSED WITH PATIENT:  I discussed the specifics of tumor, prognosis, and genetics of benign lesions.  I explained that treatment of these lesions would be purely  cosmetic and not medically neccessary.  I discussed with patient different removal options including excision, cautery and /or laser.      Nature and genetics of benign skin lesions dicussed with patient.  Signs and Symptoms of skin cancer discussed with patient.  ABCDEs of melanoma reviewed with patient.  Patient encouraged to perform monthly skin exams.  UV precautions reviewed with patient.  Patient to follow up with Primary Care provider regarding elevated blood pressure.  Skin care regimen reviewed with patient: Eliminate harsh soaps, i.e. Dial, zest, irsih spring; Mild soaps such as Cetaphil or Dove sensitive skin, avoid hot or cold showers, aggressive use of emollients including vanicream, cetaphil or cerave discussed with patient.    Risks of non-melanoma skin cancer discussed with patient   Return to clinic 6 months  Patient to follow up with Primary Care provider regarding elevated blood pressure.

## 2018-11-30 DIAGNOSIS — E78.5 HYPERLIPIDEMIA LDL GOAL <130: ICD-10-CM

## 2018-11-30 DIAGNOSIS — E89.0 POSTSURGICAL HYPOTHYROIDISM: ICD-10-CM

## 2018-11-30 DIAGNOSIS — C73 PAPILLARY CARCINOMA OF THYROID (H): ICD-10-CM

## 2018-11-30 LAB
ALT SERPL W P-5'-P-CCNC: 36 U/L (ref 0–70)
CHOLEST SERPL-MCNC: 194 MG/DL
HDLC SERPL-MCNC: 51 MG/DL
LDLC SERPL CALC-MCNC: 111 MG/DL
NONHDLC SERPL-MCNC: 143 MG/DL
TRIGL SERPL-MCNC: 160 MG/DL
TSH SERPL DL<=0.005 MIU/L-ACNC: 0.7 MU/L (ref 0.4–4)

## 2018-11-30 PROCEDURE — 80061 LIPID PANEL: CPT | Performed by: NURSE PRACTITIONER

## 2018-11-30 PROCEDURE — 84443 ASSAY THYROID STIM HORMONE: CPT | Performed by: NURSE PRACTITIONER

## 2018-11-30 PROCEDURE — 84460 ALANINE AMINO (ALT) (SGPT): CPT | Performed by: NURSE PRACTITIONER

## 2018-11-30 PROCEDURE — 36415 COLL VENOUS BLD VENIPUNCTURE: CPT | Performed by: NURSE PRACTITIONER

## 2018-12-06 ENCOUNTER — OFFICE VISIT (OUTPATIENT)
Dept: FAMILY MEDICINE | Facility: CLINIC | Age: 53
End: 2018-12-06
Payer: COMMERCIAL

## 2018-12-06 VITALS
OXYGEN SATURATION: 97 % | RESPIRATION RATE: 16 BRPM | TEMPERATURE: 97.2 F | WEIGHT: 187 LBS | HEART RATE: 72 BPM | SYSTOLIC BLOOD PRESSURE: 140 MMHG | DIASTOLIC BLOOD PRESSURE: 88 MMHG | HEIGHT: 72 IN | BODY MASS INDEX: 25.33 KG/M2

## 2018-12-06 DIAGNOSIS — R03.0 ELEVATED BLOOD PRESSURE READING WITHOUT DIAGNOSIS OF HYPERTENSION: ICD-10-CM

## 2018-12-06 DIAGNOSIS — C73 PAPILLARY CARCINOMA OF THYROID (H): ICD-10-CM

## 2018-12-06 DIAGNOSIS — E89.0 POSTSURGICAL HYPOTHYROIDISM: Primary | ICD-10-CM

## 2018-12-06 DIAGNOSIS — N52.8 OTHER MALE ERECTILE DYSFUNCTION: ICD-10-CM

## 2018-12-06 PROCEDURE — 99214 OFFICE O/P EST MOD 30 MIN: CPT | Performed by: NURSE PRACTITIONER

## 2018-12-06 RX ORDER — LEVOTHYROXINE SODIUM 112 UG/1
TABLET ORAL
Qty: 24 TABLET | Refills: 3 | Status: SHIPPED | OUTPATIENT
Start: 2018-12-06 | End: 2020-01-29

## 2018-12-06 RX ORDER — LEVOTHYROXINE SODIUM 50 UG/1
TABLET ORAL
Qty: 24 TABLET | Refills: 3 | Status: SHIPPED | OUTPATIENT
Start: 2018-12-06 | End: 2020-01-29

## 2018-12-06 RX ORDER — LEVOTHYROXINE SODIUM 175 MCG
TABLET ORAL
Qty: 360 TABLET | Refills: 3 | Status: SHIPPED | OUTPATIENT
Start: 2018-12-06 | End: 2020-01-29

## 2018-12-06 RX ORDER — SILDENAFIL CITRATE 20 MG/1
20 TABLET ORAL DAILY PRN
Qty: 30 TABLET | Refills: 3 | Status: SHIPPED | OUTPATIENT
Start: 2018-12-06 | End: 2019-03-19

## 2018-12-06 NOTE — PATIENT INSTRUCTIONS
Thank you for choosing [facility].  You may be receiving a survey in the mail from Pradip Wise regarding your visit today.  Please take a few minutes to complete and return the survey to let us know how we are doing.      If you have questions or concerns, please contact us via [employer] or you can contact your care team at [phone].    Our Clinic hours are:  Monday 6:40 am  to 7:00 pm  Tuesday -Friday 6:40 am to 5:00 pm    The [place] outpatient lab hours are:  Monday - Friday 6:10 am to 4:45 pm  Saturdays 7:00 am to 11:00 am  Appointments are required, call [phone]    If you have clinical questions after hours or would like to schedule an appointment,  call the clinic at [phone].

## 2018-12-06 NOTE — PROGRESS NOTES
SUBJECTIVE:   Marcelo Hawkins is a 53 year old male who presents to clinic today for the following health issues:      Hyperlipidemia Follow-Up      Rate your low fat/cholesterol diet?: good    Taking statin?  Yes, no muscle aches from statin    Other lipid medications/supplements?:  none  LDL Cholesterol Calculated   Date Value Ref Range Status   11/30/2018 111 (H) <100 mg/dL Final     Comment:     Above desirable:  100-129 mg/dl  Borderline High:  130-159 mg/dL  High:             160-189 mg/dL  Very high:       >189 mg/dl         Hypothyroidism Follow-up      Since last visit, patient describes the following symptoms: Weight stable, no hair loss, no skin changes, no constipation, no loose stools      Amount of exercise or physical activity: 4-5 days/week for an average of 15-30 minutes    Problems taking medications regularly: No    Medication side effects: none    Diet: regular (no restrictions)  Lab Results   Component Value Date    TSH 0.70 11/30/2018       ED: needs refill of Sildenafil- 20 mg tablets- working well.     Elevated blood pressure  BP Readings from Last 3 Encounters:   12/06/18 140/88   11/06/18 134/84   10/05/18 141/83           -------------------------------------    Problem list and histories reviewed & adjusted, as indicated.  Additional history: as documented    Patient Active Problem List   Diagnosis     Papillary carcinoma of thyroid (H)     Hyperlipidemia LDL goal <130     Osteopenia     Internal hemorrhoids     H/O seasonal allergies     Left foot pain     Anxiety     Gastroesophageal reflux disease without esophagitis     Postsurgical hypothyroidism     Elevated blood pressure reading without diagnosis of hypertension     Past Surgical History:   Procedure Laterality Date     COLONOSCOPY N/A 3/17/2016    Procedure: COLONOSCOPY;  Surgeon: Jovanny Fuller MD;  Location: [facility]     ESOPHAGOSCOPY, GASTROSCOPY, DUODENOSCOPY (EGD), COMBINED N/A 5/18/2017    Procedure: COMBINED ESOPHAGOSCOPY,  GASTROSCOPY, DUODENOSCOPY (EGD), BIOPSY SINGLE OR MULTIPLE;  Gastroscopy  ;  Surgeon: Jovanny Fuller MD;  Location: [facility]     SURGICAL HISTORY OF -       hernia times three-right inguinal     SURGICAL HISTORY OF -   1989    thyroidectomy       Social History   Substance Use Topics     Smoking status: Never Smoker     Smokeless tobacco: Never Used     Alcohol use Yes      Comment: 2 every couple of months     Family History   Problem Relation Age of Onset     Lipids Mother      Hypertension Father      Cancer Father      sweat gland cancer     Other - See Comments Father 77     IPF - ideomatic pulmonary fibrosis     Lipids Brother      Thyroid Disease Brother      Arthritis Maternal Grandfather      Heart Disease Paternal Grandmother      Melanoma No family hx of            Reviewed and updated as needed this visit by clinical staff  Tobacco  Allergies  Meds  Med Hx  Surg Hx  Fam Hx  Soc Hx      Reviewed and updated as needed this visit by Provider         ROS:  Constitutional, HEENT, cardiovascular, pulmonary, GI, , musculoskeletal, neuro, skin, endocrine and psych systems are negative, except as otherwise noted.    OBJECTIVE:     /88 (BP Location: Left arm, Patient Position: Chair, Cuff Size: Adult Regular)  Pulse 72  Temp 97.2  F (36.2  C) (Tympanic)  Resp 16  Ht 6' (1.829 m)  Wt 187 lb (84.8 kg)  SpO2 97%  BMI 25.36 kg/m2  Body mass index is 25.36 kg/(m^2).  GENERAL: healthy, alert and no distress  RESP: lungs clear to auscultation - no rales, rhonchi or wheezes  CV: regular rate and rhythm, normal S1 S2, no S3 or S4, no murmur, click or rub, no peripheral edema and peripheral pulses strong  MS: no gross musculoskeletal defects noted, no edema    Diagnostic Test Results:  none     ASSESSMENT/PLAN:       1. Postsurgical hypothyroidism  Stable  - TSH with free T4 reflex; Future  - levothyroxine (SYNTHROID) 50 MCG tablet; Take 1 tablet (to make 162 mcg) 2 days a week alternating with taking 175  mcg 5 days per week. BRAND NAME- Do NOT SUBSTITUTE  Dispense: 24 tablet; Refill: 3  - levothyroxine (SYNTHROID) 112 MCG tablet; Take 1 tablet (to make 162 mcg) 2 days a week alternating with taking 175 mcg 5 days per week. BRAND NAME- Do NOT SUBSTITUTE  Dispense: 24 tablet; Refill: 3  - SYNTHROID 175 MCG tablet; Take 1 tablet 5 days a week alternating with taking 162 mcg 2 days per week  Dispense: 360 tablet; Refill: 3    2. Papillary carcinoma of thyroid (H)    - levothyroxine (SYNTHROID) 50 MCG tablet; Take 1 tablet (to make 162 mcg) 2 days a week alternating with taking 175 mcg 5 days per week. BRAND NAME- Do NOT SUBSTITUTE  Dispense: 24 tablet; Refill: 3  - levothyroxine (SYNTHROID) 112 MCG tablet; Take 1 tablet (to make 162 mcg) 2 days a week alternating with taking 175 mcg 5 days per week. BRAND NAME- Do NOT SUBSTITUTE  Dispense: 24 tablet; Refill: 3  - SYNTHROID 175 MCG tablet; Take 1 tablet 5 days a week alternating with taking 162 mcg 2 days per week  Dispense: 360 tablet; Refill: 3    3. Other male erectile dysfunction    - sildenafil (REVATIO) 20 MG tablet; Take 1 tablet (20 mg) by mouth daily as needed 30 min to 4 hrs before sex. Do not use with nitroglycerin, terazosin or doxazosin.  Dispense: 30 tablet; Refill: 3    4. Elevated blood pressure reading without diagnosis of hypertension  Counseled on diet and exercise  Schedule RN visit to recheck blood pressure-             MATTHEW [facility]

## 2018-12-06 NOTE — MR AVS SNAPSHOT
After Visit Summary   12/6/2018    Marcelo Hawkins    MRN: [id number]           Patient Information     Date Of Birth          1965        Visit Information        Provider Department      12/6/2018 7:00 AM Maya Lema APRN CNP [facility]        Today's Diagnoses     Postsurgical hypothyroidism    -  1    Papillary carcinoma of thyroid (H)        Other male erectile dysfunction          Care Instructions          Thank you for choosing [facility].  You may be receiving a survey in the mail from [employer] regarding your visit today.  Please take a few minutes to complete and return the survey to let us know how we are doing.      If you have questions or concerns, please contact us via [employer] or you can contact your care team at [phone].    Our Clinic hours are:  Monday 6:40 am  to 7:00 pm  Tuesday -Friday 6:40 am to 5:00 pm    The [place] outpatient lab hours are:  Monday - Friday 6:10 am to 4:45 pm  Saturdays 7:00 am to 11:00 am  Appointments are required, call [phone]    If you have clinical questions after hours or would like to schedule an appointment,  call the clinic at [phone].            Follow-ups after your visit        Your next 10 appointments already scheduled     Dec 20, 2018  8:00 AM CST   New Visit with Marcelo Sanford DPM   [facility] ([facility])    [address]  [address]  [place] [place] [facility] [place] [address]   [phone]            May 03, 2019  7:20 AM CDT   Return Visit with Stefani Kaye PA-C   [facility] ([facility])    [address] [address]   [phone]              Future tests that were ordered for you today     Open Future Orders        Priority Expected Expires Ordered    TSH with free T4 reflex Routine 2/6/2019 12/6/2019 12/6/2018            Who to contact     If you have questions or need follow up information about  today's clinic visit or your schedule please contact [facility] directly at [phone].  Normal or non-critical lab and imaging results will be communicated to you by MyChart, letter or phone within 4 business days after the clinic has received the results. If you do not hear from us within 7 days, please contact the clinic through [employer] or phone. If you have a critical or abnormal lab result, we will notify you by phone as soon as possible.  Submit refill requests through [employer] or call your pharmacy and they will forward the refill request to us. Please allow 3 business days for your refill to be completed.          Additional Information About Your Visit        [employer] Information     [employer] gives you secure access to your electronic health record. If you see a primary care provider, you can also send messages to your care team and make appointments. If you have questions, please call your primary care clinic.  If you do not have a primary care provider, please call [phone] and they will assist you.        Care EveryWhere ID     This is your Care EveryWhere ID. This could be used by other organizations to access your [place] medical records  [id number]        Your Vitals Were     Pulse Temperature Respirations Height Pulse Oximetry BMI (Body Mass Index)    72 97.2  F (36.2  C) (Tympanic) 16 6' (1.829 m) 97% 25.36 kg/m2       Blood Pressure from Last 3 Encounters:   12/06/18 140/88   11/06/18 134/84   10/05/18 141/83    Weight from Last 3 Encounters:   12/06/18 187 lb (84.8 kg)   08/23/18 181 lb (82.1 kg)   04/19/18 169 lb (76.7 kg)                 Where to get your medicines      These medications were sent to [facility] - [address]  [address], [place] [place] [facility] [address]     Phone:  [phone]     levothyroxine 112 MCG tablet    levothyroxine 50 MCG tablet    SYNTHROID 175 MCG tablet         Call your pharmacy to confirm that your  medication is ready for pickup. It may take up to 24 hours for them to receive the prescription. If the prescription is not ready within 3 business days, please contact your clinic or your provider.     We will let you know when these medications are ready. If you don't hear back within 3 business days, please contact us.     sildenafil 20 MG tablet          Primary Care Provider Office Phone # Fax #    MATTHEW Landry [facility] [phone] [address] [address]        Equal Access to Services     MARCIAL MONTES DE OCA : Hadii aad ku hadasho Soomaali, waaxda luqadaha, qaybta kaalmada adeegyada, waxkary haynes haydaisy luevano . So [facility] [phone].    ATENCIÓN: Si habla español, tiene a bhatti disposición servicios gratuitos de asistencia lingüística. Samariaame al [phone].    We comply with applicable federal civil rights laws and [place] laws. We do not discriminate on the basis of race, color, national origin, age, disability, sex, sexual orientation, or gender identity.            Thank you!     Thank you for choosing [facility]  for your care. Our goal is always to provide you with excellent care. Hearing back from our patients is one way we can continue to improve our services. Please take a few minutes to complete the written survey that you may receive in the mail after your visit with us. Thank you!             Your Updated Medication List - Protect others around you: Learn how to safely use, store and throw away your medicines at www.disposemymeds.org.          This list is accurate as of 12/6/18  7:29 AM.  Always use your most recent med list.                   Brand Name Dispense Instructions for use Diagnosis    calcium 600 + D 600-400 MG-UNIT per tablet   Generic drug:  calcium carbonate 600 mg-vitamin D 400 units     100 tablet    Take 1 tablet by mouth 2 times daily.        * levothyroxine 50 MCG tablet    SYNTHROID    24 tablet    Take 1 tablet (to  make 162 mcg) 2 days a week alternating with taking 175 mcg 5 days per week. BRAND NAME- Do NOT SUBSTITUTE    Papillary carcinoma of thyroid (H), Postsurgical hypothyroidism       * levothyroxine 112 MCG tablet    SYNTHROID    24 tablet    Take 1 tablet (to make 162 mcg) 2 days a week alternating with taking 175 mcg 5 days per week. BRAND NAME- Do NOT SUBSTITUTE    Papillary carcinoma of thyroid (H), Postsurgical hypothyroidism       * SYNTHROID 175 MCG tablet   Generic drug:  levothyroxine     360 tablet    Take 1 tablet 5 days a week alternating with taking 162 mcg 2 days per week    Papillary carcinoma of thyroid (H), Postsurgical hypothyroidism       loratadine 10 MG tablet    CLARITIN    90 tablet    Take 1 tablet (10 mg) by mouth daily    H/O seasonal allergies       MULTIVITAMIN PO           order for DME     2 Device    Superfeet inserts    Left foot pain, Neuroma, Faust's, left       RANITIDINE 75 PO      Take 75 mg by mouth daily        sildenafil 20 MG tablet    REVATIO    30 tablet    Take 1 tablet (20 mg) by mouth daily as needed 30 min to 4 hrs before sex. Do not use with nitroglycerin, terazosin or doxazosin.    Other male erectile dysfunction       simvastatin 20 MG tablet    ZOCOR    90 tablet    TAKE 1 TABLET BY MOUTH AT  BEDTIME    Routine general medical examination at a health care facility, Hyperlipidemia LDL goal <130       * Notice:  This list has 3 medication(s) that are the same as other medications prescribed for you. Read the directions carefully, and ask your doctor or other care provider to review them with you.

## 2018-12-20 ENCOUNTER — OFFICE VISIT (OUTPATIENT)
Dept: PODIATRY | Facility: CLINIC | Age: 53
End: 2018-12-20
Payer: COMMERCIAL

## 2018-12-20 VITALS
HEART RATE: 59 BPM | BODY MASS INDEX: 25.33 KG/M2 | WEIGHT: 187 LBS | SYSTOLIC BLOOD PRESSURE: 130 MMHG | HEIGHT: 72 IN | DIASTOLIC BLOOD PRESSURE: 71 MMHG

## 2018-12-20 DIAGNOSIS — B07.0 PLANTAR WART, LEFT FOOT: ICD-10-CM

## 2018-12-20 DIAGNOSIS — M77.8 CAPSULITIS OF LEFT FOOT: Primary | ICD-10-CM

## 2018-12-20 PROCEDURE — 99213 OFFICE O/P EST LOW 20 MIN: CPT | Performed by: PODIATRIST

## 2018-12-20 ASSESSMENT — MIFFLIN-ST. JEOR: SCORE: 1731.23

## 2018-12-20 NOTE — PROGRESS NOTES
Marcelo returns to the office for reevaluation of the left foot.  The patient relates following the instructions given at the last visit with noted less pain.  The patient relates overall more  improvement in pain and function of the left foot.  The patient relates developing a wart on the second toe of the left foot.  He has been treating the wart with Compound W with some resolution..    PAST MEDICAL HISTORY:   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, residual hypothyroidism     Other and unspecified hyperlipidemia      Postsurgical hypothyroidism     s/p thryoidectomy      Squamous cell carcinoma        BMI= Body mass index is 25.36 kg/m .    Weight management plan: Patient was referred to their PCP to discuss a diet and exercise plan.    Physical Exam:    General: The patient appears to have a pleasant mental affect.    Lower extremity physical exam:  Neurovascular status is intact with palpable pedal pulses and intact epicritic sensations.  Muscular exam is within normal limits to major muscle groups.  Integument is intact.      One notes decreased edema.  One notes no surrounding erythema to the ball of the left foot.  Decreased pain of the ball the left foot.  Noted verrucous lesion located on the second toe of the left foot.       Assessment:      ICD-10-CM    1. Capsulitis of left foot M77.52 ORTHOTICS REFERRAL   2. Plantar wart, left foot B07.0        Plan:  I have explained to Marcelo about the conditions.  At this time, The patient was referred to [facility] for custom orthotics that will aid in offloading the tension forces to the soft tissues and prevent further inflammation.  The patient will continue using Compound W for the wart lesion.      Disclaimer: This note consists of symbols derived from keyboarding, dictation and/or voice recognition software. As a result, there may be errors in the script that  have gone undetected. Please consider this when interpreting information found in this chart.       SYEDA Sanford D.P.M., WESTLEY.F.A.S.

## 2018-12-20 NOTE — LETTER
12/20/2018         RE: Marcelo Hawkins  [address] [address] [place] [address]        Dear Colleague,    Thank you for referring your patient, Marcelo Hawkins, to the [facility]. Please see a copy of my visit note below.    Marcelo returns to the office for reevaluation of the left foot.  The patient relates following the instructions given at the last visit with noted less pain.  The patient relates overall more  improvement in pain and function of the left foot.  The patient relates developing a wart on the second toe of the left foot.  He has been treating the wart with Compound W with some resolution..    PAST MEDICAL HISTORY:   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, residual hypothyroidism     Other and unspecified hyperlipidemia      Postsurgical hypothyroidism     s/p thryoidectomy      Squamous cell carcinoma        BMI= Body mass index is 25.36 kg/m .    Weight management plan: Patient was referred to their PCP to discuss a diet and exercise plan.    Physical Exam:    General: The patient appears to have a pleasant mental affect.    Lower extremity physical exam:  Neurovascular status is intact with palpable pedal pulses and intact epicritic sensations.  Muscular exam is within normal limits to major muscle groups.  Integument is intact.      One notes decreased edema.  One notes no surrounding erythema to the ball of the left foot.  Decreased pain of the ball the left foot.  Noted verrucous lesion located on the second toe of the left foot.       Assessment:      ICD-10-CM    1. Capsulitis of left foot M77.52 ORTHOTICS REFERRAL   2. Plantar wart, left foot B07.0        Plan:  I have explained to Marcelo about the conditions.  At this time, The patient was referred to [facility] for custom orthotics that will aid in offloading the tension forces to the soft tissues and  prevent further inflammation.  The patient will continue using Compound W for the wart lesion.      Disclaimer: This note consists of symbols derived from keyboarding, dictation and/or voice recognition software. As a result, there may be errors in the script that have gone undetected. Please consider this when interpreting information found in this chart.       SYEDA Sanford D.P.M., F.BAN.F.A.S.      Again, thank you for allowing me to participate in the care of your patient.        Sincerely,        Marcelo Sanford DPM

## 2018-12-20 NOTE — NURSING NOTE
Chief Complaint   Patient presents with     RECHECK     wants new orthotics and have a wart checked out on the left foot       Initial /71 (BP Location: Left arm, Patient Position: Chair, Cuff Size: Adult Regular)   Pulse 59   Ht 1.829 m (6')   Wt 84.8 kg (187 lb)   BMI 25.36 kg/m   Estimated body mass index is 25.36 kg/m  as calculated from the following:    Height as of this encounter: 1.829 m (6').    Weight as of this encounter: 84.8 kg (187 lb).  Medications and allergies reviewed.    Sandhya MICHAELS, CMA

## 2019-03-18 DIAGNOSIS — N52.8 OTHER MALE ERECTILE DYSFUNCTION: ICD-10-CM

## 2019-03-18 NOTE — TELEPHONE ENCOUNTER
"Requested Prescriptions   Pending Prescriptions Disp Refills     sildenafil (REVATIO) 20 MG tablet  Last Written Prescription Date:  12/6/18  Last Fill Quantity: 30,  # refills: 3   Last office visit: 12/6/2018 with prescribing provider:  [facility]   Future Office Visit:   Next 5 appointments (look out 90 days)    May 10, 2019  7:40 AM CDT  Return Visit with Stefani Kaye PA-C  [facility] [address] [address]  [phone]          30 tablet 3     Sig: Take 1 tablet (20 mg) by mouth daily as needed 30 min to 4 hrs before sex. Do not use with nitroglycerin, terazosin or doxazosin.    Erectile Dysfuction Protocol Passed - 3/18/2019 10:42 AM       Passed - Absence of nitrates on medication list       Passed - Absence of Alpha Blockers on Med list       Passed - Recent (12 mo) or future (30 days) visit within the authorizing provider's specialty    Patient had office visit in the last 12 months or has a visit in the next 30 days with authorizing provider or within the authorizing provider's specialty.  See \"Patient Info\" tab in inbasket, or \"Choose Columns\" in Meds & Orders section of the refill encounter.             Passed - Medication is active on med list       Passed - Patient is age 18 or older          "

## 2019-03-19 RX ORDER — SILDENAFIL CITRATE 20 MG/1
20 TABLET ORAL DAILY PRN
Qty: 30 TABLET | Refills: 3 | Status: SHIPPED | OUTPATIENT
Start: 2019-03-19 | End: 2019-07-11

## 2019-03-19 NOTE — TELEPHONE ENCOUNTER
Prescription approved per [facility] Refill Protocol.    Caprice SAAVEDRA RN

## 2019-05-14 ENCOUNTER — OFFICE VISIT (OUTPATIENT)
Dept: DERMATOLOGY | Facility: CLINIC | Age: 54
End: 2019-05-14
Payer: COMMERCIAL

## 2019-05-14 VITALS — HEART RATE: 79 BPM | OXYGEN SATURATION: 98 % | SYSTOLIC BLOOD PRESSURE: 141 MMHG | DIASTOLIC BLOOD PRESSURE: 88 MMHG

## 2019-05-14 DIAGNOSIS — D18.01 CHERRY ANGIOMA: ICD-10-CM

## 2019-05-14 DIAGNOSIS — L81.4 LENTIGO: ICD-10-CM

## 2019-05-14 DIAGNOSIS — Z86.007 HISTORY OF SQUAMOUS CELL CARCINOMA IN SITU: Primary | ICD-10-CM

## 2019-05-14 DIAGNOSIS — D22.9 MULTIPLE BENIGN NEVI: ICD-10-CM

## 2019-05-14 DIAGNOSIS — L82.1 SEBORRHEIC KERATOSIS: ICD-10-CM

## 2019-05-14 DIAGNOSIS — L57.0 ACTINIC KERATOSIS: ICD-10-CM

## 2019-05-14 PROCEDURE — 99213 OFFICE O/P EST LOW 20 MIN: CPT | Performed by: PHYSICIAN ASSISTANT

## 2019-05-14 NOTE — LETTER
5/14/2019         RE: Marcelo Hawkins  [address] [address] [place] [id number]        Dear Colleague,    Thank you for referring your patient, Marcelo Hawkins, to the [facility]. Please see a copy of my visit note below.    Marcelo Hawkins is a 54 year old year old male patient here today for recheck after mohs on left arm. Patient has SCIS removed on left arm, he notes has healed well. Denies any painful or bleeding spots. Patient has no other skin complaints today.  Remainder of the HPI, Meds, PMH, Allergies, FH, and SH was reviewed in chart.    Pertinent Hx:   History of SCIS  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, residual hypothyroidism     Other and unspecified hyperlipidemia      Postsurgical hypothyroidism     s/p thryoidectomy      Squamous cell carcinoma        Past Surgical History:   Procedure Laterality Date     COLONOSCOPY N/A 3/17/2016    Procedure: COLONOSCOPY;  Surgeon: Jovanny Fuller MD;  Location: [facility]     ESOPHAGOSCOPY, GASTROSCOPY, DUODENOSCOPY (EGD), COMBINED N/A 5/18/2017    Procedure: COMBINED ESOPHAGOSCOPY, GASTROSCOPY, DUODENOSCOPY (EGD), BIOPSY SINGLE OR MULTIPLE;  Gastroscopy  ;  Surgeon: Jovanny Fuller MD;  Location: [facility]     SURGICAL HISTORY OF -       hernia times three-right inguinal     SURGICAL HISTORY OF -   1989    thyroidectomy        Family History   Problem Relation Age of Onset     Lipids Mother      Hypertension Father      Cancer Father         sweat gland cancer     Other - See Comments Father 77        IPF - ideomatic pulmonary fibrosis     Lipids Brother      Thyroid Disease Brother      Arthritis Maternal Grandfather      Heart Disease Paternal Grandmother      Melanoma No family hx of        Social History     Socioeconomic History     Marital status:      Spouse name: Merline     Number of children: 2     Years of education: Not on file     Highest education  level: Not on file   Occupational History     Employer: [employer]   Social Needs     Financial resource strain: Not on file     Food insecurity:     Worry: Not on file     Inability: Not on file     Transportation needs:     Medical: Not on file     Non-medical: Not on file   Tobacco Use     Smoking status: Never Smoker     Smokeless tobacco: Never Used   Substance and Sexual Activity     Alcohol use: Yes     Comment: 2 every couple of months     Drug use: No     Sexual activity: Yes     Partners: Female     Birth control/protection: Surgical   Lifestyle     Physical activity:     Days per week: Not on file     Minutes per session: Not on file     Stress: Not on file   Relationships     Social connections:     Talks on phone: Not on file     Gets together: Not on file     Attends [employer] service: Not on file     Active member of club or organization: Not on file     Attends meetings of clubs or organizations: Not on file     Relationship status: Not on file     Intimate partner violence:     Fear of current or ex partner: Not on file     Emotionally abused: Not on file     Physically abused: Not on file     Forced sexual activity: Not on file   Other Topics Concern      Service Not Asked     Blood Transfusions Not Asked     Caffeine Concern Yes     Comment: 1 coffee daily     Occupational Exposure Not Asked     Hobby Hazards Not Asked     Sleep Concern Not Asked     Stress Concern Not Asked     Weight Concern Not Asked     Special Diet Not Asked     Back Care Not Asked     Exercise Yes     Comment: walks & Running-3 x days a week     Bike Helmet Not Asked     Seat Belt Yes     Self-Exams Yes     Parent/sibling w/ CABG, MI or angioplasty before 65F 55M? No   Social History Narrative     Not on file       Outpatient Encounter Medications as of 5/14/2019   Medication Sig Dispense Refill     calcium-vitamin D (CALCIUM 600 + D) 600-400 MG-UNIT per tablet Take 1 tablet by mouth 2 times daily. 100 tablet 3      levothyroxine (SYNTHROID) 112 MCG tablet Take 1 tablet (to make 162 mcg) 2 days a week alternating with taking 175 mcg 5 days per week. BRAND NAME- Do NOT SUBSTITUTE 24 tablet 3     levothyroxine (SYNTHROID) 50 MCG tablet Take 1 tablet (to make 162 mcg) 2 days a week alternating with taking 175 mcg 5 days per week. BRAND NAME- Do NOT SUBSTITUTE 24 tablet 3     loratadine (CLARITIN) 10 MG tablet Take 1 tablet (10 mg) by mouth daily 90 tablet 3     Multiple Vitamins-Minerals (MULTIVITAMIN OR)        ORDER FOR DME Superfeet inserts 2 Device 0     RaNITidine HCl (RANITIDINE 75 PO) Take 75 mg by mouth daily       sildenafil (REVATIO) 20 MG tablet Take 1 tablet (20 mg) by mouth daily as needed 30 min to 4 hrs before sex. Do not use with nitroglycerin, terazosin or doxazosin. 30 tablet 3     simvastatin (ZOCOR) 20 MG tablet TAKE 1 TABLET BY MOUTH AT  BEDTIME 90 tablet 3     SYNTHROID 175 MCG tablet Take 1 tablet 5 days a week alternating with taking 162 mcg 2 days per week 360 tablet 3     No facility-administered encounter medications on file as of 5/14/2019.              Review Of Systems  Skin: As above  Eyes: negative  Ears/Nose/Throat: negative  Respiratory: No shortness of breath, dyspnea on exertion, cough, or hemoptysis  Cardiovascular: negative  Gastrointestinal: negative  Genitourinary: negative  Musculoskeletal: negative  Neurologic: negative  Psychiatric: negative  Hematologic/Lymphatic/Immunologic: negative  Endocrine: negative      O:   NAD, WDWN, Alert & Oriented, Mood & Affect wnl, Vitals stable   Here today alone   /88   Pulse 79   SpO2 98%    General appearance normal   Vitals stable   Alert, oriented and in no acute distress   Pink gritty papules on arms   Stuck on papules and brown macules on trunk and ext   Red papules on trunk  Brown macules with regular pigment network and borders on torso and extremities   Well healed scar on left arm   The remainder of the detailed exam was unremarkable;  the following areas were examined:  scalp/hair, conjunctiva/lids, face, neck, lips/teeth, oral mucosa/gingiva, chest, back, abdomen, buttocks, digits/nails, RUE, LUE, RLE, LLE.      Eyes: Conjunctivae/lids:Normal     ENT: Lips: normal    MSK:Normal    Pulm: Breathing Normal    Neuro/Psych: Orientation:Normal; Mood/Affect:Normal  A/P:  1. Actinic keratoses   Apply Efudex twice daily for 2-3 weeks. Will get red and raw. Patient will call to treat in fall.   2. Seborrheic keratosis, lentigo, angioma, benign nevi, History of SCIS   BENIGN LESIONS DISCUSSED WITH PATIENT:  I discussed the specifics of tumor, prognosis, and genetics of benign lesions.  I explained that treatment of these lesions would be purely cosmetic and not medically neccessary.  I discussed with patient different removal options including excision, cautery and /or laser.      Nature and genetics of benign skin lesions dicussed with patient.  Signs and Symptoms of skin cancer discussed with patient.  ABCDEs of melanoma reviewed with patient.  Patient encouraged to perform monthly skin exams.  UV precautions reviewed with patient.  Risks of non-melanoma skin cancer discussed with patient   Return to clinic in after using efudex.   Marcelo to follow up with Primary Care provider regarding elevated blood pressure.      Again, thank you for allowing me to participate in the care of your patient.        Sincerely,        Stefani Clifton PA-C

## 2019-07-11 DIAGNOSIS — N52.8 OTHER MALE ERECTILE DYSFUNCTION: ICD-10-CM

## 2019-07-11 RX ORDER — SILDENAFIL CITRATE 20 MG/1
20 TABLET ORAL DAILY PRN
Qty: 30 TABLET | Refills: 3 | Status: SHIPPED | OUTPATIENT
Start: 2019-07-11 | End: 2019-10-24

## 2019-07-11 NOTE — TELEPHONE ENCOUNTER
"Requested Prescriptions   Pending Prescriptions Disp Refills     sildenafil (REVATIO) 20 MG tablet [Pharmacy Med Name: SILDENAFIL CITRATE 20 MG TABLET] 30 tablet 3     Sig: Take 1 tablet (20 mg) by mouth daily as needed 30 min to 4 hrs before sex. Do not use with nitroglycerin, terazosin or doxazosin.       Erectile Dysfuction Protocol Passed - 7/11/2019  8:00 AM        Passed - Absence of nitrates on medication list        Passed - Absence of Alpha Blockers on Med list        Passed - Recent (12 mo) or future (30 days) visit within the authorizing provider's specialty     Patient had office visit in the last 12 months or has a visit in the next 30 days with authorizing provider or within the authorizing provider's specialty.  See \"Patient Info\" tab in inbasket, or \"Choose Columns\" in Meds & Orders section of the refill encounter.              Passed - Medication is active on med list        Passed - Patient is age 18 or older        Last Written Prescription Date:  3/19/19  Last Fill Quantity: 30,  # refills: 3   Last office visit: 12/6/2018 with prescribing provider:  [facility]   Future Office Visit:      "

## 2019-07-11 NOTE — TELEPHONE ENCOUNTER
Prescription approved per [facility] Refill Protocol.    Caprice SAAVEDRA RN

## 2019-10-24 DIAGNOSIS — N52.8 OTHER MALE ERECTILE DYSFUNCTION: ICD-10-CM

## 2019-10-24 RX ORDER — SILDENAFIL CITRATE 20 MG/1
20 TABLET ORAL DAILY PRN
Qty: 30 TABLET | Refills: 0 | Status: SHIPPED | OUTPATIENT
Start: 2019-10-24 | End: 2020-03-30

## 2019-10-24 NOTE — TELEPHONE ENCOUNTER
"Requested Prescriptions   Pending Prescriptions Disp Refills     sildenafil (REVATIO) 20 MG tablet [Pharmacy Med Name: SILDENAFIL CITRATE 20 MG TABLET] 30 tablet 3     Sig: Take 1 tablet (20 mg) by mouth daily as needed 30 min to 4 hrs before sex. Do not use with nitroglycerin, terazosin or doxazosin.       Erectile Dysfuction Protocol Passed - 10/24/2019  8:01 AM        Passed - Absence of nitrates on medication list        Passed - Absence of Alpha Blockers on Med list        Passed - Recent (12 mo) or future (30 days) visit within the authorizing provider's specialty     Patient has had an office visit with the authorizing provider or a provider within the authorizing providers department within the previous 12 mos or has a future within next 30 days. See \"Patient Info\" tab in inbasket, or \"Choose Columns\" in Meds & Orders section of the refill encounter.              Passed - Medication is active on med list        Passed - Patient is age 18 or older        Last Written Prescription Date:  7/11/2019  Last Fill Quantity: 30,  # refills: 3   Last office visit: 12/6/2018 with prescribing provider:  [facility]   Future Office Visit:      "

## 2019-11-07 ENCOUNTER — HEALTH MAINTENANCE LETTER (OUTPATIENT)
Age: 54
End: 2019-11-07

## 2019-11-25 DIAGNOSIS — L57.0 ACTINIC KERATOSIS: ICD-10-CM

## 2019-11-25 NOTE — TELEPHONE ENCOUNTER
Patient has picked up both refills ordered in October. Will need to call patient and find out what he is treating and why he needs another refill? Gisell Baker RN

## 2019-11-25 NOTE — TELEPHONE ENCOUNTER
Reason for Call:  Medication or medication refill:    Do you use a [facility]?  Name of the pharmacy and phone number for the current request:  [facility] [phone]    Name of the medication requested: Fluorouracil 5%    Other request:   LAST REFILL: 11/12/2019    (any refills will be placed on file)  LOV: 05/14/2019    Can we leave a detailed message on this number? Not Applicable    Phone number patient can be reached at: Home number on file [phone] (home)    Best Time: NA    Call taken on 11/25/2019 at 8:17 AM by Denise Behrendt

## 2019-11-26 RX ORDER — FLUOROURACIL 50 MG/G
CREAM TOPICAL 2 TIMES DAILY
Qty: 40 G | Refills: 1 | OUTPATIENT
Start: 2019-11-26

## 2019-11-26 NOTE — TELEPHONE ENCOUNTER
Message left to return call. Did he use the Efudex cream? (Pharmacy noted filled x 2)    Where is he applying it? If already applied, did he get a good response?   (Red, raw and rough looking areas that appear to have been dragged across asphalt is what we expect to see after Efudex topical chemotherapy treatment)     Gisell Baker RN

## 2019-11-26 NOTE — TELEPHONE ENCOUNTER
Pt returns call and reports that he did not request a refill on Fluorouracil.  He mentions that he has not even started on the 2nd one yet.    Pt advised that I would make note of this and remove this med from being refilled.    Michelle Parada  [facility] RN

## 2019-12-04 ENCOUNTER — OFFICE VISIT (OUTPATIENT)
Dept: DERMATOLOGY | Facility: CLINIC | Age: 54
End: 2019-12-04
Payer: COMMERCIAL

## 2019-12-04 VITALS — OXYGEN SATURATION: 98 % | DIASTOLIC BLOOD PRESSURE: 86 MMHG | HEART RATE: 83 BPM | SYSTOLIC BLOOD PRESSURE: 147 MMHG

## 2019-12-04 DIAGNOSIS — L57.0 ACTINIC KERATOSIS: ICD-10-CM

## 2019-12-04 PROCEDURE — 99213 OFFICE O/P EST LOW 20 MIN: CPT | Mod: 25 | Performed by: PHYSICIAN ASSISTANT

## 2019-12-04 PROCEDURE — 17000 DESTRUCT PREMALG LESION: CPT | Performed by: PHYSICIAN ASSISTANT

## 2019-12-04 RX ORDER — FLUOROURACIL 50 MG/G
CREAM TOPICAL 2 TIMES DAILY
Qty: 40 G | Refills: 1 | Status: SHIPPED | OUTPATIENT
Start: 2019-12-04 | End: 2019-12-23

## 2019-12-04 NOTE — LETTER
12/4/2019         RE: Marcelo Hawkins  [address] [address] [place] [phone]        Dear Colleague,    Thank you for referring your patient, Marcelo Hawkins, to the [facility]. Please see a copy of my visit note below.    Marcelo Hawkins is a 54 year old year old male patient here today for recheck after efudex. He notes he has treated right arm for past two weeks. He is seeing redness and scabbing. No issues with bleeding. He also notes new spot on left shoulder.  Patient has no other skin complaints today.  Remainder of the HPI, Meds, PMH, Allergies, FH, and SH was reviewed in chart.    Pertinent Hx:  History of SCC  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, residual hypothyroidism     Other and unspecified hyperlipidemia      Postsurgical hypothyroidism     s/p thryoidectomy      Squamous cell carcinoma        Past Surgical History:   Procedure Laterality Date     COLONOSCOPY N/A 3/17/2016    Procedure: COLONOSCOPY;  Surgeon: Jovanny Fuller MD;  Location: [facility]     ESOPHAGOSCOPY, GASTROSCOPY, DUODENOSCOPY (EGD), COMBINED N/A 5/18/2017    Procedure: COMBINED ESOPHAGOSCOPY, GASTROSCOPY, DUODENOSCOPY (EGD), BIOPSY SINGLE OR MULTIPLE;  Gastroscopy  ;  Surgeon: Jovanny Fuller MD;  Location: [facility]     SURGICAL HISTORY OF -       hernia times three-right inguinal     SURGICAL HISTORY OF -   1989    thyroidectomy        Family History   Problem Relation Age of Onset     Lipids Mother      Hypertension Father      Cancer Father         sweat gland cancer     Other - See Comments Father 77        IPF - ideomatic pulmonary fibrosis     Lipids Brother      Thyroid Disease Brother      Arthritis Maternal Grandfather      Heart Disease Paternal Grandmother      Melanoma No family hx of        Social History     Socioeconomic History     Marital status:      Spouse name: Merline     Number of children: 2     Years of  education: Not on file     Highest education level: Not on file   Occupational History     Employer: [employer]   Social Needs     Financial resource strain: Not on file     Food insecurity:     Worry: Not on file     Inability: Not on file     Transportation needs:     Medical: Not on file     Non-medical: Not on file   Tobacco Use     Smoking status: Never Smoker     Smokeless tobacco: Never Used   Substance and Sexual Activity     Alcohol use: Yes     Comment: 2 every couple of months     Drug use: No     Sexual activity: Yes     Partners: Female     Birth control/protection: Surgical   Lifestyle     Physical activity:     Days per week: Not on file     Minutes per session: Not on file     Stress: Not on file   Relationships     Social connections:     Talks on phone: Not on file     Gets together: Not on file     Attends Taoism service: Not on file     Active member of club or organization: Not on file     Attends meetings of clubs or organizations: Not on file     Relationship status: Not on file     Intimate partner violence:     Fear of current or ex partner: Not on file     Emotionally abused: Not on file     Physically abused: Not on file     Forced sexual activity: Not on file   Other Topics Concern      Service Not Asked     Blood Transfusions Not Asked     Caffeine Concern Yes     Comment: 1 coffee daily     Occupational Exposure Not Asked     Hobby Hazards Not Asked     Sleep Concern Not Asked     Stress Concern Not Asked     Weight Concern Not Asked     Special Diet Not Asked     Back Care Not Asked     Exercise Yes     Comment: walks & Running-3 x days a week     Bike Helmet Not Asked     Seat Belt Yes     Self-Exams Yes     Parent/sibling w/ CABG, MI or angioplasty before 65F 55M? No   Social History Narrative     Not on file       Outpatient Encounter Medications as of 12/4/2019   Medication Sig Dispense Refill     calcium-vitamin D (CALCIUM 600 + D) 600-400 MG-UNIT per tablet Take 1  tablet by mouth 2 times daily. 100 tablet 3     fluorouracil (EFUDEX) 5 % external cream Apply topically 2 times daily For 14 days 40 g 1     levothyroxine (SYNTHROID) 112 MCG tablet Take 1 tablet (to make 162 mcg) 2 days a week alternating with taking 175 mcg 5 days per week. BRAND NAME- Do NOT SUBSTITUTE 24 tablet 3     levothyroxine (SYNTHROID) 50 MCG tablet Take 1 tablet (to make 162 mcg) 2 days a week alternating with taking 175 mcg 5 days per week. BRAND NAME- Do NOT SUBSTITUTE 24 tablet 3     loratadine (CLARITIN) 10 MG tablet Take 1 tablet (10 mg) by mouth daily 90 tablet 3     Multiple Vitamins-Minerals (MULTIVITAMIN OR)        ORDER FOR DME Superfeet inserts 2 Device 0     sildenafil (REVATIO) 20 MG tablet Take 1 tablet (20 mg) by mouth daily as needed 30 min to 4 hrs before sex. Do not use with nitroglycerin, terazosin or doxazosin. 30 tablet 0     simvastatin (ZOCOR) 20 MG tablet TAKE 1 TABLET BY MOUTH AT  BEDTIME 90 tablet 3     SYNTHROID 175 MCG tablet Take 1 tablet 5 days a week alternating with taking 162 mcg 2 days per week 360 tablet 3     RaNITidine HCl (RANITIDINE 75 PO) Take 75 mg by mouth daily       [DISCONTINUED] fluorouracil (EFUDEX) 5 % external cream Apply topically 2 times daily For 14 days 40 g 1     No facility-administered encounter medications on file as of 12/4/2019.              Review Of Systems  Skin: As above  Eyes: negative  Ears/Nose/Throat: negative  Respiratory: No shortness of breath, dyspnea on exertion, cough, or hemoptysis  Cardiovascular: negative  Gastrointestinal: negative  Genitourinary: negative  Musculoskeletal: negative  Neurologic: negative  Psychiatric: negative  Hematologic/Lymphatic/Immunologic: negative  Endocrine: negative      O:   NAD, WDWN, Alert & Oriented, Mood & Affect wnl, Vitals stable   Here today alone   BP (!) 147/86 (BP Location: Right arm, Patient Position: Chair, Cuff Size: Adult Regular)   Pulse 83   SpO2 98%    General appearance  normal   Vitals stable   Alert, oriented and in no acute distress     Pink scabbed macules on right arm  Pink gritty macule on left shoulder  Gritty macules on left arm       Eyes: Conjunctivae/lids:Normal     ENT: Lips: normal    MSK:Normal    Pulm: Breathing Normal    Neuro/Psych: Orientation:Alert and Orientedx3 ; Mood/Affect:normal   A/P:  1. Actinic keratoses   Do efudex for one more week on right arm then switch to treat left arm for three weeks.   Recheck in 6 weeks.   2. Actinic keratosis on left shoulder  He preferred to treat with cryo.  LN2:  Treated with LN2 for 5s for 1-2 cycles. Warned risks of blistering, pain, pigment change, scarring, and incomplete resolution.  Advised patient to return if lesions do not completely resolve.  Wound care sheet given.  Marcelo to follow up with Primary Care provider regarding elevated blood pressure.        Again, thank you for allowing me to participate in the care of your patient.        Sincerely,        Stefani Clifton PA-C

## 2019-12-04 NOTE — NURSING NOTE
Chief Complaint   Patient presents with     Skin Check     check Effudex treatment area       Initial BP (!) 147/86 (BP Location: Right arm, Patient Position: Chair, Cuff Size: Adult Regular)   Pulse 83   SpO2 98%  Estimated body mass index is 25.36 kg/m  as calculated from the following:    Height as of 12/20/18: 1.829 m (6').    Weight as of 12/20/18: 84.8 kg (187 lb).  Medications and allergies reviewed.    Sandhya MICHAELS CMA ([facility])

## 2019-12-06 NOTE — PROGRESS NOTES
Marcelo Hawkins is a 54 year old year old male patient here today for recheck after efudex. He notes he has treated right arm for past two weeks. He is seeing redness and scabbing. No issues with bleeding. He also notes new spot on left shoulder.  Patient has no other skin complaints today.  Remainder of the HPI, Meds, PMH, Allergies, FH, and SH was reviewed in chart.    Pertinent Hx:  History of SCC  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, residual hypothyroidism     Other and unspecified hyperlipidemia      Postsurgical hypothyroidism     s/p thryoidectomy      Squamous cell carcinoma        Past Surgical History:   Procedure Laterality Date     COLONOSCOPY N/A 3/17/2016    Procedure: COLONOSCOPY;  Surgeon: Jovanny Fuller MD;  Location: [facility]     ESOPHAGOSCOPY, GASTROSCOPY, DUODENOSCOPY (EGD), COMBINED N/A 5/18/2017    Procedure: COMBINED ESOPHAGOSCOPY, GASTROSCOPY, DUODENOSCOPY (EGD), BIOPSY SINGLE OR MULTIPLE;  Gastroscopy  ;  Surgeon: Jovanny Fuller MD;  Location: [facility]     SURGICAL HISTORY OF -       hernia times three-right inguinal     SURGICAL HISTORY OF -   1989    thyroidectomy        Family History   Problem Relation Age of Onset     Lipids Mother      Hypertension Father      Cancer Father         sweat gland cancer     Other - See Comments Father 77        IPF - ideomatic pulmonary fibrosis     Lipids Brother      Thyroid Disease Brother      Arthritis Maternal Grandfather      Heart Disease Paternal Grandmother      Melanoma No family hx of        Social History     Socioeconomic History     Marital status:      Spouse name: Merline     Number of children: 2     Years of education: Not on file     Highest education level: Not on file   Occupational History     Employer: [employer]   Social Needs     Financial resource strain: Not on file     Food insecurity:     Worry: Not on file     Inability: Not on file      Transportation needs:     Medical: Not on file     Non-medical: Not on file   Tobacco Use     Smoking status: Never Smoker     Smokeless tobacco: Never Used   Substance and Sexual Activity     Alcohol use: Yes     Comment: 2 every couple of months     Drug use: No     Sexual activity: Yes     Partners: Female     Birth control/protection: Surgical   Lifestyle     Physical activity:     Days per week: Not on file     Minutes per session: Not on file     Stress: Not on file   Relationships     Social connections:     Talks on phone: Not on file     Gets together: Not on file     Attends Mormon service: Not on file     Active member of club or organization: Not on file     Attends meetings of clubs or organizations: Not on file     Relationship status: Not on file     Intimate partner violence:     Fear of current or ex partner: Not on file     Emotionally abused: Not on file     Physically abused: Not on file     Forced sexual activity: Not on file   Other Topics Concern      Service Not Asked     Blood Transfusions Not Asked     Caffeine Concern Yes     Comment: 1 coffee daily     Occupational Exposure Not Asked     Hobby Hazards Not Asked     Sleep Concern Not Asked     Stress Concern Not Asked     Weight Concern Not Asked     Special Diet Not Asked     Back Care Not Asked     Exercise Yes     Comment: walks & Running-3 x days a week     Bike Helmet Not Asked     Seat Belt Yes     Self-Exams Yes     Parent/sibling w/ CABG, MI or angioplasty before 65F 55M? No   Social History Narrative     Not on file       Outpatient Encounter Medications as of 12/4/2019   Medication Sig Dispense Refill     calcium-vitamin D (CALCIUM 600 + D) 600-400 MG-UNIT per tablet Take 1 tablet by mouth 2 times daily. 100 tablet 3     fluorouracil (EFUDEX) 5 % external cream Apply topically 2 times daily For 14 days 40 g 1     levothyroxine (SYNTHROID) 112 MCG tablet Take 1 tablet (to make 162 mcg) 2 days a week alternating with  taking 175 mcg 5 days per week. BRAND NAME- Do NOT SUBSTITUTE 24 tablet 3     levothyroxine (SYNTHROID) 50 MCG tablet Take 1 tablet (to make 162 mcg) 2 days a week alternating with taking 175 mcg 5 days per week. BRAND NAME- Do NOT SUBSTITUTE 24 tablet 3     loratadine (CLARITIN) 10 MG tablet Take 1 tablet (10 mg) by mouth daily 90 tablet 3     Multiple Vitamins-Minerals (MULTIVITAMIN OR)        ORDER FOR DME Superfeet inserts 2 Device 0     sildenafil (REVATIO) 20 MG tablet Take 1 tablet (20 mg) by mouth daily as needed 30 min to 4 hrs before sex. Do not use with nitroglycerin, terazosin or doxazosin. 30 tablet 0     simvastatin (ZOCOR) 20 MG tablet TAKE 1 TABLET BY MOUTH AT  BEDTIME 90 tablet 3     SYNTHROID 175 MCG tablet Take 1 tablet 5 days a week alternating with taking 162 mcg 2 days per week 360 tablet 3     RaNITidine HCl (RANITIDINE 75 PO) Take 75 mg by mouth daily       [DISCONTINUED] fluorouracil (EFUDEX) 5 % external cream Apply topically 2 times daily For 14 days 40 g 1     No facility-administered encounter medications on file as of 12/4/2019.              Review Of Systems  Skin: As above  Eyes: negative  Ears/Nose/Throat: negative  Respiratory: No shortness of breath, dyspnea on exertion, cough, or hemoptysis  Cardiovascular: negative  Gastrointestinal: negative  Genitourinary: negative  Musculoskeletal: negative  Neurologic: negative  Psychiatric: negative  Hematologic/Lymphatic/Immunologic: negative  Endocrine: negative      O:   NAD, WDWN, Alert & Oriented, Mood & Affect wnl, Vitals stable   Here today alone   BP (!) 147/86 (BP Location: Right arm, Patient Position: Chair, Cuff Size: Adult Regular)   Pulse 83   SpO2 98%    General appearance normal   Vitals stable   Alert, oriented and in no acute distress     Pink scabbed macules on right arm  Pink gritty macule on left shoulder  Gritty macules on left arm       Eyes: Conjunctivae/lids:Normal     ENT: Lips: normal    MSK:Normal    Pulm:  Breathing Normal    Neuro/Psych: Orientation:Alert and Orientedx3 ; Mood/Affect:normal   A/P:  1. Actinic keratoses   Do efudex for one more week on right arm then switch to treat left arm for three weeks.   Recheck in 6 weeks.   2. Actinic keratosis on left shoulder  He preferred to treat with cryo.  LN2:  Treated with LN2 for 5s for 1-2 cycles. Warned risks of blistering, pain, pigment change, scarring, and incomplete resolution.  Advised patient to return if lesions do not completely resolve.  Wound care sheet given.  Marcelo to follow up with Primary Care provider regarding elevated blood pressure.

## 2019-12-18 ENCOUNTER — NURSE TRIAGE (OUTPATIENT)
Dept: NURSING | Facility: CLINIC | Age: 54
End: 2019-12-18

## 2019-12-23 DIAGNOSIS — L57.0 ACTINIC KERATOSIS: ICD-10-CM

## 2019-12-23 RX ORDER — FLUOROURACIL 50 MG/G
CREAM TOPICAL 2 TIMES DAILY
Qty: 40 G | Refills: 1 | Status: SHIPPED | OUTPATIENT
Start: 2019-12-23 | End: 2020-01-24

## 2020-01-24 ENCOUNTER — OFFICE VISIT (OUTPATIENT)
Dept: DERMATOLOGY | Facility: CLINIC | Age: 55
End: 2020-01-24
Payer: COMMERCIAL

## 2020-01-24 VITALS — HEART RATE: 85 BPM | OXYGEN SATURATION: 94 % | SYSTOLIC BLOOD PRESSURE: 133 MMHG | DIASTOLIC BLOOD PRESSURE: 87 MMHG

## 2020-01-24 DIAGNOSIS — L57.0 ACTINIC KERATOSIS: ICD-10-CM

## 2020-01-24 PROCEDURE — 99213 OFFICE O/P EST LOW 20 MIN: CPT | Performed by: PHYSICIAN ASSISTANT

## 2020-01-24 RX ORDER — FLUOROURACIL 50 MG/G
CREAM TOPICAL 2 TIMES DAILY
Qty: 40 G | Refills: 1 | Status: SHIPPED | OUTPATIENT
Start: 2020-01-24 | End: 2020-03-17

## 2020-01-24 NOTE — LETTER
1/24/2020         RE: Marcelo Hawkins  [address] [address] [place] [phone]        Dear Colleague,    Thank you for referring your patient, Marcelo Hawkins, to the [facility]. Please see a copy of my visit note below.    Marcelo Hawkins is a 54 year old year old male patient here today for recheck after efudex on arms and hands.  Patient reports that areas are still in the process of healing more so on left side since he treated right arm. He decided he would like to treat his chest.  Patient has no other skin complaints today.  Remainder of the HPI, Meds, PMH, Allergies, FH, and SH was reviewed in chart.    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, residual hypothyroidism     Other and unspecified hyperlipidemia      Postsurgical hypothyroidism     s/p thryoidectomy      Squamous cell carcinoma        Past Surgical History:   Procedure Laterality Date     COLONOSCOPY N/A 3/17/2016    Procedure: COLONOSCOPY;  Surgeon: Jovanny Fuller MD;  Location: [facility]     ESOPHAGOSCOPY, GASTROSCOPY, DUODENOSCOPY (EGD), COMBINED N/A 5/18/2017    Procedure: COMBINED ESOPHAGOSCOPY, GASTROSCOPY, DUODENOSCOPY (EGD), BIOPSY SINGLE OR MULTIPLE;  Gastroscopy  ;  Surgeon: Jovanny Fuller MD;  Location: [facility]     SURGICAL HISTORY OF -       hernia times three-right inguinal     SURGICAL HISTORY OF -   1989    thyroidectomy        Family History   Problem Relation Age of Onset     Lipids Mother      Hypertension Father      Cancer Father         sweat gland cancer     Other - See Comments Father 77        IPF - ideomatic pulmonary fibrosis     Lipids Brother      Thyroid Disease Brother      Arthritis Maternal Grandfather      Heart Disease Paternal Grandmother      Melanoma No family hx of        Social History     Socioeconomic History     Marital status:      Spouse name: Merline     Number of children: 2     Years of education: Not on  file     Highest education level: Not on file   Occupational History     Employer: [employer]   Social Needs     Financial resource strain: Not on file     Food insecurity:     Worry: Not on file     Inability: Not on file     Transportation needs:     Medical: Not on file     Non-medical: Not on file   Tobacco Use     Smoking status: Never Smoker     Smokeless tobacco: Never Used   Substance and Sexual Activity     Alcohol use: Yes     Comment: 2 every couple of months     Drug use: No     Sexual activity: Yes     Partners: Female     Birth control/protection: Surgical   Lifestyle     Physical activity:     Days per week: Not on file     Minutes per session: Not on file     Stress: Not on file   Relationships     Social connections:     Talks on phone: Not on file     Gets together: Not on file     Attends Nondenominational service: Not on file     Active member of club or organization: Not on file     Attends meetings of clubs or organizations: Not on file     Relationship status: Not on file     Intimate partner violence:     Fear of current or ex partner: Not on file     Emotionally abused: Not on file     Physically abused: Not on file     Forced sexual activity: Not on file   Other Topics Concern      Service Not Asked     Blood Transfusions Not Asked     Caffeine Concern Yes     Comment: 1 coffee daily     Occupational Exposure Not Asked     Hobby Hazards Not Asked     Sleep Concern Not Asked     Stress Concern Not Asked     Weight Concern Not Asked     Special Diet Not Asked     Back Care Not Asked     Exercise Yes     Comment: walks & Running-3 x days a week     Bike Helmet Not Asked     Seat Belt Yes     Self-Exams Yes     Parent/sibling w/ CABG, MI or angioplasty before 65F 55M? No   Social History Narrative     Not on file       Outpatient Encounter Medications as of 1/24/2020   Medication Sig Dispense Refill     calcium-vitamin D (CALCIUM 600 + D) 600-400 MG-UNIT per tablet Take 1 tablet by mouth 2 times  daily. 100 tablet 3     fluorouracil (EFUDEX) 5 % external cream Apply topically 2 times daily For 14 days 40 g 1     levothyroxine (SYNTHROID) 112 MCG tablet Take 1 tablet (to make 162 mcg) 2 days a week alternating with taking 175 mcg 5 days per week. BRAND NAME- Do NOT SUBSTITUTE 24 tablet 3     levothyroxine (SYNTHROID) 50 MCG tablet Take 1 tablet (to make 162 mcg) 2 days a week alternating with taking 175 mcg 5 days per week. BRAND NAME- Do NOT SUBSTITUTE 24 tablet 3     loratadine (CLARITIN) 10 MG tablet Take 1 tablet (10 mg) by mouth daily 90 tablet 3     Multiple Vitamins-Minerals (MULTIVITAMIN OR)        ORDER FOR DME Superfeet inserts 2 Device 0     RaNITidine HCl (RANITIDINE 75 PO) Take 75 mg by mouth daily       sildenafil (REVATIO) 20 MG tablet Take 1 tablet (20 mg) by mouth daily as needed 30 min to 4 hrs before sex. Do not use with nitroglycerin, terazosin or doxazosin. 30 tablet 0     simvastatin (ZOCOR) 20 MG tablet TAKE 1 TABLET BY MOUTH AT  BEDTIME 90 tablet 3     SYNTHROID 175 MCG tablet Take 1 tablet 5 days a week alternating with taking 162 mcg 2 days per week 360 tablet 3     [DISCONTINUED] fluorouracil (EFUDEX) 5 % external cream Apply topically 2 times daily For 14 days (Patient not taking: Reported on 1/24/2020) 40 g 1     No facility-administered encounter medications on file as of 1/24/2020.              Review Of Systems  Skin: As above  Eyes: negative  Ears/Nose/Throat: negative  Respiratory: No shortness of breath, dyspnea on exertion, cough, or hemoptysis  Cardiovascular: negative  Gastrointestinal: negative  Genitourinary: negative  Musculoskeletal: negative  Neurologic: negative  Psychiatric: negative  Hematologic/Lymphatic/Immunologic: negative  Endocrine: negative      O:   NAD, WDWN, Alert & Oriented, Mood & Affect wnl, Vitals stable   Here today alone   /87   Pulse 85   SpO2 94%    General appearance normal   Vitals stable   Alert, oriented and in no acute  distress     Healing pink macule on right and left arm      Eyes: Conjunctivae/lids:Normal     ENT: Lips: normal    MSK:Normal    Pulm: Breathing Normal    Neuro/Psych: Orientation:Alert and Orientedx3 ; Mood/Affect:normal   A/P:  1. History of SCIS on left forearm  No evidence of recurrence.   2. Actinic keratoses on arms  Healing, discussed areas should fade with time.   3. Sun damaged skin  Patient would like to treat forehead and chest with efudex. He will recheck in 2 months.     Again, thank you for allowing me to participate in the care of your patient.        Sincerely,        Stefani Clifton PA-C

## 2020-01-26 DIAGNOSIS — Z00.00 ROUTINE GENERAL MEDICAL EXAMINATION AT A HEALTH CARE FACILITY: ICD-10-CM

## 2020-01-26 DIAGNOSIS — C73 PAPILLARY CARCINOMA OF THYROID (H): ICD-10-CM

## 2020-01-26 DIAGNOSIS — E78.5 HYPERLIPIDEMIA LDL GOAL <130: ICD-10-CM

## 2020-01-26 DIAGNOSIS — E89.0 POSTSURGICAL HYPOTHYROIDISM: ICD-10-CM

## 2020-01-26 NOTE — LETTER
[facility]  [address], [place] [address]  Phone: [phone]       January 29, 2020         Marcelo Hawkins  [address] [address] [place] [phone]            Dear Marcelo:    We are concerned about your health care.  We recently provided you with medication refills.  Many medications require routine follow-up with your doctor.    Your prescription(s) have been refilled for 30 days so you may have time for the above noted follow-up. Please call to schedule soon so we can assure you have an appointment before your next refills are needed.    Thank you,      Maya Lema NP / jennifer

## 2020-01-27 NOTE — PROGRESS NOTES
Marcelo Hawkins is a 54 year old year old male patient here today for recheck after efudex on arms and hands.  Patient reports that areas are still in the process of healing more so on left side since he treated right arm. He decided he would like to treat his chest.  Patient has no other skin complaints today.  Remainder of the HPI, Meds, PMH, Allergies, FH, and SH was reviewed in chart.    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, residual hypothyroidism     Other and unspecified hyperlipidemia      Postsurgical hypothyroidism     s/p thryoidectomy      Squamous cell carcinoma        Past Surgical History:   Procedure Laterality Date     COLONOSCOPY N/A 3/17/2016    Procedure: COLONOSCOPY;  Surgeon: Jovanny Fuller MD;  Location: [facility]     ESOPHAGOSCOPY, GASTROSCOPY, DUODENOSCOPY (EGD), COMBINED N/A 5/18/2017    Procedure: COMBINED ESOPHAGOSCOPY, GASTROSCOPY, DUODENOSCOPY (EGD), BIOPSY SINGLE OR MULTIPLE;  Gastroscopy  ;  Surgeon: Jovanny Fuller MD;  Location: [facility]     SURGICAL HISTORY OF -       hernia times three-right inguinal     SURGICAL HISTORY OF -   1989    thyroidectomy        Family History   Problem Relation Age of Onset     Lipids Mother      Hypertension Father      Cancer Father         sweat gland cancer     Other - See Comments Father 77        IPF - ideomatic pulmonary fibrosis     Lipids Brother      Thyroid Disease Brother      Arthritis Maternal Grandfather      Heart Disease Paternal Grandmother      Melanoma No family hx of        Social History     Socioeconomic History     Marital status:      Spouse name: Merline     Number of children: 2     Years of education: Not on file     Highest education level: Not on file   Occupational History     Employer: [employer]   Social Needs     Financial resource strain: Not on file     Food insecurity:     Worry: Not on file     Inability: Not on file     Transportation  needs:     Medical: Not on file     Non-medical: Not on file   Tobacco Use     Smoking status: Never Smoker     Smokeless tobacco: Never Used   Substance and Sexual Activity     Alcohol use: Yes     Comment: 2 every couple of months     Drug use: No     Sexual activity: Yes     Partners: Female     Birth control/protection: Surgical   Lifestyle     Physical activity:     Days per week: Not on file     Minutes per session: Not on file     Stress: Not on file   Relationships     Social connections:     Talks on phone: Not on file     Gets together: Not on file     Attends Mandaeism service: Not on file     Active member of club or organization: Not on file     Attends meetings of clubs or organizations: Not on file     Relationship status: Not on file     Intimate partner violence:     Fear of current or ex partner: Not on file     Emotionally abused: Not on file     Physically abused: Not on file     Forced sexual activity: Not on file   Other Topics Concern      Service Not Asked     Blood Transfusions Not Asked     Caffeine Concern Yes     Comment: 1 coffee daily     Occupational Exposure Not Asked     Hobby Hazards Not Asked     Sleep Concern Not Asked     Stress Concern Not Asked     Weight Concern Not Asked     Special Diet Not Asked     Back Care Not Asked     Exercise Yes     Comment: walks & Running-3 x days a week     Bike Helmet Not Asked     Seat Belt Yes     Self-Exams Yes     Parent/sibling w/ CABG, MI or angioplasty before 65F 55M? No   Social History Narrative     Not on file       Outpatient Encounter Medications as of 1/24/2020   Medication Sig Dispense Refill     calcium-vitamin D (CALCIUM 600 + D) 600-400 MG-UNIT per tablet Take 1 tablet by mouth 2 times daily. 100 tablet 3     fluorouracil (EFUDEX) 5 % external cream Apply topically 2 times daily For 14 days 40 g 1     levothyroxine (SYNTHROID) 112 MCG tablet Take 1 tablet (to make 162 mcg) 2 days a week alternating with taking 175 mcg 5  days per week. BRAND NAME- Do NOT SUBSTITUTE 24 tablet 3     levothyroxine (SYNTHROID) 50 MCG tablet Take 1 tablet (to make 162 mcg) 2 days a week alternating with taking 175 mcg 5 days per week. BRAND NAME- Do NOT SUBSTITUTE 24 tablet 3     loratadine (CLARITIN) 10 MG tablet Take 1 tablet (10 mg) by mouth daily 90 tablet 3     Multiple Vitamins-Minerals (MULTIVITAMIN OR)        ORDER FOR DME Superfeet inserts 2 Device 0     RaNITidine HCl (RANITIDINE 75 PO) Take 75 mg by mouth daily       sildenafil (REVATIO) 20 MG tablet Take 1 tablet (20 mg) by mouth daily as needed 30 min to 4 hrs before sex. Do not use with nitroglycerin, terazosin or doxazosin. 30 tablet 0     simvastatin (ZOCOR) 20 MG tablet TAKE 1 TABLET BY MOUTH AT  BEDTIME 90 tablet 3     SYNTHROID 175 MCG tablet Take 1 tablet 5 days a week alternating with taking 162 mcg 2 days per week 360 tablet 3     [DISCONTINUED] fluorouracil (EFUDEX) 5 % external cream Apply topically 2 times daily For 14 days (Patient not taking: Reported on 1/24/2020) 40 g 1     No facility-administered encounter medications on file as of 1/24/2020.              Review Of Systems  Skin: As above  Eyes: negative  Ears/Nose/Throat: negative  Respiratory: No shortness of breath, dyspnea on exertion, cough, or hemoptysis  Cardiovascular: negative  Gastrointestinal: negative  Genitourinary: negative  Musculoskeletal: negative  Neurologic: negative  Psychiatric: negative  Hematologic/Lymphatic/Immunologic: negative  Endocrine: negative      O:   NAD, WDWN, Alert & Oriented, Mood & Affect wnl, Vitals stable   Here today alone   /87   Pulse 85   SpO2 94%    General appearance normal   Vitals stable   Alert, oriented and in no acute distress     Healing pink macule on right and left arm      Eyes: Conjunctivae/lids:Normal     ENT: Lips: normal    MSK:Normal    Pulm: Breathing Normal    Neuro/Psych: Orientation:Alert and Orientedx3 ; Mood/Affect:normal   A/P:  1. History of SCIS on  left forearm  No evidence of recurrence.   2. Actinic keratoses on arms  Healing, discussed areas should fade with time.   3. Sun damaged skin  Patient would like to treat forehead and chest with efudex. He will recheck in 2 months.

## 2020-01-27 NOTE — TELEPHONE ENCOUNTER
"Requested Prescriptions   Pending Prescriptions Disp Refills     simvastatin (ZOCOR) 20 MG tablet [Pharmacy Med Name: SIMVASTATIN  20MG  TAB]  Last Written Prescription Date:  7/2/18  Last Fill Quantity: 90,  # refills: 3   Last Office Visit with [facility] or [facility] prescribing provider:  12/6/18   Future Office Visit:      90 tablet 3     Sig: TAKE 1 TABLET BY MOUTH AT  BEDTIME       Statins Protocol Failed - 1/26/2020  3:58 PM        Failed - LDL on file in past 12 months     Recent Labs   Lab Test 11/30/18  0641   *             Failed - Recent (12 mo) or future (30 days) visit within the authorizing provider's specialty     Patient has had an office visit with the authorizing provider or a provider within the authorizing providers department within the previous 12 mos or has a future within next 30 days. See \"Patient Info\" tab in inbasket, or \"Choose Columns\" in Meds & Orders section of the refill encounter.              Passed - No abnormal creatine kinase in past 12 months     No lab results found.             Passed - Medication is active on med list        Passed - Patient is age 18 or older        SYNTHROID 175 MCG tablet [Pharmacy Med Name: SYNTHROID  175MCG  TAB] 65 tablet      Sig: TAKE 1 TABLET BY MOUTH 5  DAYS A WEEK ALTERNATING  WITH TAKING 162 MCG 2 DAYS  PER WEEK  Last Written Prescription Date:  12/6/18  Last Fill Quantity: 360,  # refills: 3   Last Office Visit with [facility] or [facility] prescribing provider:  12/6/18   Future Office Visit:            Thyroid Protocol Failed - 1/26/2020  3:58 PM        Failed - Recent (12 mo) or future (30 days) visit within the authorizing provider's specialty     Patient has had an office visit with the authorizing provider or a provider within the authorizing providers department within the previous 12 mos or has a future within next 30 days. See \"Patient Info\" tab in inbasket, or \"Choose Columns\" in Meds & Orders section of the refill encounter.          "     Failed - Normal TSH on file in past 12 months     Recent Labs   Lab Test 11/30/18  0641   TSH 0.70              Passed - Patient is 12 years or older        Passed - Medication is active on med list

## 2020-01-29 RX ORDER — SIMVASTATIN 20 MG
TABLET ORAL
Qty: 30 TABLET | Refills: 0 | Status: SHIPPED | OUTPATIENT
Start: 2020-01-29 | End: 2020-03-30

## 2020-01-29 RX ORDER — LEVOTHYROXINE SODIUM 112 MCG
TABLET ORAL
Qty: 8 TABLET | Refills: 0 | Status: SHIPPED | OUTPATIENT
Start: 2020-01-29 | End: 2020-03-30

## 2020-01-29 RX ORDER — LEVOTHYROXINE SODIUM 50 MCG
TABLET ORAL
Qty: 8 TABLET | Refills: 0 | Status: SHIPPED | OUTPATIENT
Start: 2020-01-29 | End: 2020-03-30

## 2020-01-29 RX ORDER — LEVOTHYROXINE SODIUM 175 MCG
TABLET ORAL
Qty: 20 TABLET | Refills: 0 | Status: SHIPPED | OUTPATIENT
Start: 2020-01-29 | End: 2020-03-30

## 2020-03-17 DIAGNOSIS — L57.0 ACTINIC KERATOSIS: ICD-10-CM

## 2020-03-17 RX ORDER — FLUOROURACIL 50 MG/G
CREAM TOPICAL 2 TIMES DAILY
Qty: 40 G | Refills: 1 | Status: SHIPPED | OUTPATIENT
Start: 2020-03-17 | End: 2020-08-14

## 2020-03-17 NOTE — TELEPHONE ENCOUNTER
Last OV with Derm was 1/24/2020.  2 month f/u appointment advised.    Efudex Protocol [id number]/17/2020 09:04 AM   Refills for this medication group require provider approval     Please advise for refills.  Bruna Case RN

## 2020-03-17 NOTE — TELEPHONE ENCOUNTER
Requested Prescriptions   Pending Prescriptions Disp Refills     fluorouracil (EFUDEX) 5 % external cream 40 g 1     Sig: Apply topically 2 times daily For 14 days       There is no refill protocol information for this order        Last office visit: 1/24/2020 with prescribing provider:  JEREMIAH Kaye   Future Office Visit:          Denise Behrendt  Specialty CSS

## 2020-03-30 ENCOUNTER — MYC REFILL (OUTPATIENT)
Dept: FAMILY MEDICINE | Facility: CLINIC | Age: 55
End: 2020-03-30

## 2020-03-30 ENCOUNTER — MYC MEDICAL ADVICE (OUTPATIENT)
Dept: FAMILY MEDICINE | Facility: CLINIC | Age: 55
End: 2020-03-30

## 2020-03-30 DIAGNOSIS — C73 PAPILLARY CARCINOMA OF THYROID (H): ICD-10-CM

## 2020-03-30 DIAGNOSIS — N52.8 OTHER MALE ERECTILE DYSFUNCTION: ICD-10-CM

## 2020-03-30 DIAGNOSIS — E89.0 POSTSURGICAL HYPOTHYROIDISM: ICD-10-CM

## 2020-03-30 DIAGNOSIS — E78.5 HYPERLIPIDEMIA LDL GOAL <130: ICD-10-CM

## 2020-03-30 DIAGNOSIS — Z00.00 ROUTINE GENERAL MEDICAL EXAMINATION AT A HEALTH CARE FACILITY: ICD-10-CM

## 2020-03-30 NOTE — TELEPHONE ENCOUNTER
Covering for PCP (out of clinic today):  Can schedule a telephone visit to review meds with PCP, likely okay to defer labs pending improvement in pandemic.    Thanks,  Oscar Walsh MD

## 2020-03-30 NOTE — TELEPHONE ENCOUNTER
Carmen,    Patient calling asking for medication refills or office visit.      Last office visit is 12/2018  Last lab TSH is 11/2018    Please advise. Wendy ZAZUETA RN

## 2020-03-31 RX ORDER — LEVOTHYROXINE SODIUM 112 MCG
TABLET ORAL
Qty: 24 TABLET | Refills: 0 | Status: SHIPPED | OUTPATIENT
Start: 2020-03-31 | End: 2020-07-28

## 2020-03-31 RX ORDER — LEVOTHYROXINE SODIUM 50 MCG
TABLET ORAL
Qty: 24 TABLET | Refills: 0 | Status: SHIPPED | OUTPATIENT
Start: 2020-03-31 | End: 2020-07-28

## 2020-03-31 RX ORDER — LEVOTHYROXINE SODIUM 175 MCG
TABLET ORAL
Qty: 60 TABLET | Refills: 0 | Status: SHIPPED | OUTPATIENT
Start: 2020-03-31 | End: 2020-07-28

## 2020-03-31 RX ORDER — SIMVASTATIN 20 MG
20 TABLET ORAL AT BEDTIME
Qty: 90 TABLET | Refills: 0 | Status: SHIPPED | OUTPATIENT
Start: 2020-03-31 | End: 2020-06-09

## 2020-03-31 NOTE — TELEPHONE ENCOUNTER
Routing refill request to provider for review/approval because:  Patient needs to be seen because it has been more than 1 year since last office visit.    Ok to delay office visit?  Telephone or virtual visit?    Noelle Gorman RN

## 2020-03-31 NOTE — TELEPHONE ENCOUNTER
"Sildenafil (Revatio) 20 mg tab.  Last Written Prescription Date:  10/24/19  Last Fill Quantity: 30,  # refills: 0   Last office visit: 12/6/2018 with prescribing provider: Maya Lema CNP   Future Office Visit:      Requested Prescriptions   Pending Prescriptions Disp Refills     sildenafil (REVATIO) 20 MG tablet 30 tablet 0     Sig: Take 1 tablet (20 mg) by mouth daily as needed 30 min to 4 hrs before sex. Do not use with nitroglycerin, terazosin or doxazosin.       Erectile Dysfuction Protocol Failed - 3/30/2020  4:03 PM        Failed - Recent (12 mo) or future (30 days) visit within the authorizing provider's specialty     Patient has had an office visit with the authorizing provider or a provider within the authorizing providers department within the previous 12 mos or has a future within next 30 days. See \"Patient Info\" tab in inbasket, or \"Choose Columns\" in Meds & Orders section of the refill encounter.              Passed - Absence of nitrates on medication list        Passed - Absence of Alpha Blockers on Med list        Passed - Medication is active on med list        Passed - Patient is age 18 or older             "

## 2020-03-31 NOTE — TELEPHONE ENCOUNTER
Last office visit 12/6/18 with Claribel Lema.  Last lipids and tsh 11/30/18; all labs stable per provider.    Medication is being filled for 1 time refill only due to:  Pt is overdue for office visit and labs.     Approved x 90 days per COVID protocol.  Reminder letter sent to pt.    TOMY Gorman RN

## 2020-04-01 RX ORDER — SILDENAFIL CITRATE 20 MG/1
20 TABLET ORAL DAILY PRN
Qty: 30 TABLET | Refills: 0 | Status: SHIPPED | OUTPATIENT
Start: 2020-04-01 | End: 2020-07-28

## 2020-06-06 DIAGNOSIS — E78.5 HYPERLIPIDEMIA LDL GOAL <130: ICD-10-CM

## 2020-06-06 DIAGNOSIS — Z00.00 ROUTINE GENERAL MEDICAL EXAMINATION AT A HEALTH CARE FACILITY: ICD-10-CM

## 2020-06-08 NOTE — TELEPHONE ENCOUNTER
Routing refill request to provider for review/approval because:  Labs not current:  Lipids  Patient needs to be seen because:  Last OV 12/6/2018    Lab Results   Component Value Date    CHOL 194 11/30/2018     Lab Results   Component Value Date    HDL 51 11/30/2018     Lab Results   Component Value Date     11/30/2018     Lab Results   Component Value Date    TRIG 160 11/30/2018     Lab Results   Component Value Date    CHOLHDLRATIO 2.6 05/27/2015     Caprice SAAVEDRA RN, BSN

## 2020-06-09 RX ORDER — SIMVASTATIN 20 MG
TABLET ORAL
Qty: 90 TABLET | Refills: 0 | Status: SHIPPED | OUTPATIENT
Start: 2020-06-09 | End: 2020-07-28

## 2020-06-23 ENCOUNTER — MYC MEDICAL ADVICE (OUTPATIENT)
Dept: FAMILY MEDICINE | Facility: CLINIC | Age: 55
End: 2020-06-23

## 2020-07-28 ENCOUNTER — VIRTUAL VISIT (OUTPATIENT)
Dept: FAMILY MEDICINE | Facility: CLINIC | Age: 55
End: 2020-07-28
Payer: COMMERCIAL

## 2020-07-28 DIAGNOSIS — C73 PAPILLARY CARCINOMA OF THYROID (H): ICD-10-CM

## 2020-07-28 DIAGNOSIS — E89.0 POSTSURGICAL HYPOTHYROIDISM: ICD-10-CM

## 2020-07-28 DIAGNOSIS — E78.5 HYPERLIPIDEMIA LDL GOAL <130: Primary | ICD-10-CM

## 2020-07-28 DIAGNOSIS — N52.8 OTHER MALE ERECTILE DYSFUNCTION: ICD-10-CM

## 2020-07-28 PROCEDURE — 99214 OFFICE O/P EST MOD 30 MIN: CPT | Mod: TEL | Performed by: NURSE PRACTITIONER

## 2020-07-28 RX ORDER — SILDENAFIL CITRATE 20 MG/1
20 TABLET ORAL DAILY PRN
Qty: 30 TABLET | Refills: 6 | Status: SHIPPED | OUTPATIENT
Start: 2020-07-28 | End: 2020-12-18

## 2020-07-28 RX ORDER — LEVOTHYROXINE SODIUM 50 MCG
TABLET ORAL
Qty: 24 TABLET | Refills: 3 | Status: SHIPPED | OUTPATIENT
Start: 2020-07-28 | End: 2020-12-18

## 2020-07-28 RX ORDER — LEVOTHYROXINE SODIUM 112 MCG
TABLET ORAL
Qty: 24 TABLET | Refills: 3 | Status: SHIPPED | OUTPATIENT
Start: 2020-07-28 | End: 2020-12-18

## 2020-07-28 RX ORDER — SILDENAFIL CITRATE 20 MG/1
20 TABLET ORAL DAILY PRN
Qty: 30 TABLET | Refills: 6 | Status: SHIPPED | OUTPATIENT
Start: 2020-07-28 | End: 2020-07-28

## 2020-07-28 RX ORDER — SIMVASTATIN 20 MG
20 TABLET ORAL AT BEDTIME
Qty: 90 TABLET | Refills: 3 | Status: SHIPPED | OUTPATIENT
Start: 2020-07-28 | End: 2020-12-18

## 2020-07-28 RX ORDER — LEVOTHYROXINE SODIUM 175 MCG
TABLET ORAL
Qty: 60 TABLET | Refills: 3 | Status: SHIPPED | OUTPATIENT
Start: 2020-07-28 | End: 2020-12-18

## 2020-07-28 RX ORDER — LEVOTHYROXINE SODIUM 112 MCG
TABLET ORAL
Qty: 24 TABLET | Refills: 0 | Status: CANCELLED | OUTPATIENT
Start: 2020-07-28

## 2020-07-28 RX ORDER — LEVOTHYROXINE SODIUM 175 MCG
TABLET ORAL
Qty: 60 TABLET | Refills: 0 | Status: CANCELLED | OUTPATIENT
Start: 2020-07-28

## 2020-07-28 NOTE — PROGRESS NOTES
"Marcelo Hawkins is a 55 year old male who is being evaluated via a billable telephone visit.      The patient has been notified of following:     \"This telephone visit will be conducted via a call between you and your physician/provider. We have found that certain health care needs can be provided without the need for a physical exam.  This service lets us provide the care you need with a short phone conversation.  If a prescription is necessary we can send it directly to your pharmacy.  If lab work is needed we can place an order for that and you can then stop by our lab to have the test done at a later time.    Telephone visits are billed at different rates depending on your insurance coverage. During this emergency period, for some insurers they may be billed the same as an in-person visit.  Please reach out to your insurance provider with any questions.    If during the course of the call the physician/provider feels a telephone visit is not appropriate, you will not be charged for this service.\"    Patient has given verbal consent for Telephone visit?  Yes    What phone number would you like to be contacted at? [phone]    How would you like to obtain your AVS? Ke Melgar     Marcelo Hawkins is a 55 year old male who presents via phone visit today for the following health issues:    HPI    Hypothyroidism Follow-up      Since last visit, patient describes the following symptoms: Weight stable, no hair loss, no skin changes, no constipation, no loose stools      How many servings of fruits and vegetables do you eat daily?  2-3    On average, how many sweetened beverages do you drink each day (Examples: soda, juice, sweet tea, etc.  Do NOT count diet or artificially sweetened beverages)?   0    How many days per week do you exercise enough to make your heart beat faster? 3 or less    How many minutes a day do you exercise enough to make your heart beat faster? 9 or less    How many days per week do you " miss taking your medication? 0    Hyperlipidemia:    LDL Cholesterol Calculated   Date Value Ref Range Status   11/30/2018 111 (H) <100 mg/dL Final     Comment:     Above desirable:  100-129 mg/dl  Borderline High:  130-159 mg/dL  High:             160-189 mg/dL  Very high:       >189 mg/dl       ED: requesting refill of Cialis .           Patient Active Problem List   Diagnosis     Papillary carcinoma of thyroid (H)     Hyperlipidemia LDL goal <130     Osteopenia     Internal hemorrhoids     H/O seasonal allergies     Left foot pain     Anxiety     Gastroesophageal reflux disease without esophagitis     Postsurgical hypothyroidism     Elevated blood pressure reading without diagnosis of hypertension     Past Surgical History:   Procedure Laterality Date     COLONOSCOPY N/A 3/17/2016    Procedure: COLONOSCOPY;  Surgeon: Jovanny Fuller MD;  Location: [facility]     ESOPHAGOSCOPY, GASTROSCOPY, DUODENOSCOPY (EGD), COMBINED N/A 5/18/2017    Procedure: COMBINED ESOPHAGOSCOPY, GASTROSCOPY, DUODENOSCOPY (EGD), BIOPSY SINGLE OR MULTIPLE;  Gastroscopy  ;  Surgeon: Jovanny Fuller MD;  Location: [facility]     SURGICAL HISTORY OF -       hernia times three-right inguinal     SURGICAL HISTORY OF -   1989    thyroidectomy       Social History     Tobacco Use     Smoking status: Never Smoker     Smokeless tobacco: Never Used   Substance Use Topics     Alcohol use: Yes     Comment: 2 every couple of months     Family History   Problem Relation Age of Onset     Lipids Mother      Hypertension Father      Cancer Father         sweat gland cancer     Other - See Comments Father 77        IPF - ideomatic pulmonary fibrosis     Lipids Brother      Thyroid Disease Brother      Arthritis Maternal Grandfather      Heart Disease Paternal Grandmother      Melanoma No family hx of            Reviewed and updated as needed this visit by Provider         Review of Systems   Constitutional, HEENT, cardiovascular, pulmonary, GI, , musculoskeletal,  neuro, skin, endocrine and psych systems are negative, except as otherwise noted.       Objective   Reported vitals:  There were no vitals taken for this visit.   healthy, alert and no distress  PSYCH: Alert and oriented times 3; coherent speech, normal   rate and volume, able to articulate logical thoughts, able   to abstract reason, no tangential thoughts, no hallucinations   or delusions  His affect is normal  RESP: No cough, no audible wheezing, able to talk in full sentences  Remainder of exam unable to be completed due to telephone visits    Diagnostic Test Results:  Labs reviewed in Epic        Assessment/Plan:    1. Papillary carcinoma of thyroid (H)    - **TSH with free T4 reflex FUTURE anytime; Future    2. Postsurgical hypothyroidism    - **TSH with free T4 reflex FUTURE anytime; Future    3. Other male erectile dysfunction    - sildenafil (REVATIO) 20 MG tablet; Take 1 tablet (20 mg) by mouth daily as needed 30 min to 4 hrs before sex. Do not use with nitroglycerin, terazosin or doxazosin.  Dispense: 30 tablet; Refill: 6    4. Hyperlipidemia LDL goal <130  Tolerating statin  - Lipid panel reflex to direct LDL Fasting; Future  - **Comprehensive metabolic panel FUTURE anytime; Future  - simvastatin (ZOCOR) 20 MG tablet; Take 1 tablet (20 mg) by mouth At Bedtime  Dispense: 90 tablet; Refill: 3      No follow-ups on file.      Phone call duration:  6 minutes    MATTHEW Landry CNP

## 2020-07-31 ENCOUNTER — TELEPHONE (OUTPATIENT)
Dept: FAMILY MEDICINE | Facility: CLINIC | Age: 55
End: 2020-07-31

## 2020-07-31 DIAGNOSIS — C73 PAPILLARY CARCINOMA OF THYROID (H): ICD-10-CM

## 2020-07-31 DIAGNOSIS — E89.0 POSTSURGICAL HYPOTHYROIDISM: ICD-10-CM

## 2020-07-31 DIAGNOSIS — E78.5 HYPERLIPIDEMIA LDL GOAL <130: ICD-10-CM

## 2020-07-31 LAB
ALBUMIN SERPL-MCNC: 4.1 G/DL (ref 3.4–5)
ALP SERPL-CCNC: 41 U/L (ref 40–150)
ALT SERPL W P-5'-P-CCNC: 28 U/L (ref 0–70)
ANION GAP SERPL CALCULATED.3IONS-SCNC: 4 MMOL/L (ref 3–14)
AST SERPL W P-5'-P-CCNC: 22 U/L (ref 0–45)
BILIRUB SERPL-MCNC: 0.7 MG/DL (ref 0.2–1.3)
BUN SERPL-MCNC: 15 MG/DL (ref 7–30)
CALCIUM SERPL-MCNC: 8.9 MG/DL (ref 8.5–10.1)
CHLORIDE SERPL-SCNC: 102 MMOL/L (ref 94–109)
CHOLEST SERPL-MCNC: 207 MG/DL
CO2 SERPL-SCNC: 30 MMOL/L (ref 20–32)
CREAT SERPL-MCNC: 0.96 MG/DL (ref 0.66–1.25)
GFR SERPL CREATININE-BSD FRML MDRD: 88 ML/MIN/{1.73_M2}
GLUCOSE SERPL-MCNC: 81 MG/DL (ref 70–99)
HDLC SERPL-MCNC: 61 MG/DL
LDLC SERPL CALC-MCNC: 126 MG/DL
NONHDLC SERPL-MCNC: 146 MG/DL
POTASSIUM SERPL-SCNC: 4 MMOL/L (ref 3.4–5.3)
PROT SERPL-MCNC: 7.4 G/DL (ref 6.8–8.8)
SODIUM SERPL-SCNC: 136 MMOL/L (ref 133–144)
TRIGL SERPL-MCNC: 102 MG/DL
TSH SERPL DL<=0.005 MIU/L-ACNC: 1.68 MU/L (ref 0.4–4)

## 2020-07-31 PROCEDURE — 80053 COMPREHEN METABOLIC PANEL: CPT | Performed by: NURSE PRACTITIONER

## 2020-07-31 PROCEDURE — 80061 LIPID PANEL: CPT | Performed by: NURSE PRACTITIONER

## 2020-07-31 PROCEDURE — 84443 ASSAY THYROID STIM HORMONE: CPT | Performed by: NURSE PRACTITIONER

## 2020-07-31 PROCEDURE — 36415 COLL VENOUS BLD VENIPUNCTURE: CPT | Performed by: NURSE PRACTITIONER

## 2020-07-31 NOTE — TELEPHONE ENCOUNTER
Central Prior Authorization Team  Phone: [phone]    PA Initiation    Medication: sildenafil (REVATIO) 20 MG tablet   Insurance Company: [employer] - Phone [phone] Fax [phone]  Pharmacy Filling the Rx: [facility] - [place] - [address] [address] [facility]  Filling Pharmacy Phone: [phone]  Filling Pharmacy Fax:    Start Date: 7/31/2020

## 2020-07-31 NOTE — TELEPHONE ENCOUNTER
Prior Authorization Retail Medication Request    Medication/Dose:   ICD code (if different than what is on RX):  Other male erectile dysfunction [N52.8]  Previously Tried and Failed:    Rationale:      Insurance Name:  [employer]  Insurance ID:  [id number]      Pharmacy Information (if different than what is on RX)  [facility]:  [facility]  Phone:    [facility] Key: [id number]

## 2020-08-03 NOTE — TELEPHONE ENCOUNTER
PRIOR AUTHORIZATION DENIED    Medication: sildenafil (REVATIO) 20 MG tablet- DENIED     Denial Date: 8/1/2020    Denial Rational:       Appeal Information: If provider would like to appeal please provide a letter of medical necessity.

## 2020-08-14 ENCOUNTER — OFFICE VISIT (OUTPATIENT)
Dept: DERMATOLOGY | Facility: CLINIC | Age: 55
End: 2020-08-14
Payer: COMMERCIAL

## 2020-08-14 VITALS
BODY MASS INDEX: 25.36 KG/M2 | HEIGHT: 72 IN | DIASTOLIC BLOOD PRESSURE: 79 MMHG | SYSTOLIC BLOOD PRESSURE: 142 MMHG | HEART RATE: 83 BPM

## 2020-08-14 DIAGNOSIS — L57.0 ACTINIC KERATOSIS: ICD-10-CM

## 2020-08-14 DIAGNOSIS — D22.9 MULTIPLE BENIGN NEVI: ICD-10-CM

## 2020-08-14 DIAGNOSIS — Z86.007 HISTORY OF SQUAMOUS CELL CARCINOMA IN SITU: Primary | ICD-10-CM

## 2020-08-14 DIAGNOSIS — D18.01 CHERRY ANGIOMA: ICD-10-CM

## 2020-08-14 DIAGNOSIS — L82.1 SEBORRHEIC KERATOSIS: ICD-10-CM

## 2020-08-14 DIAGNOSIS — L81.2 EPHELIDES: ICD-10-CM

## 2020-08-14 DIAGNOSIS — L81.4 LENTIGO: ICD-10-CM

## 2020-08-14 PROCEDURE — 99213 OFFICE O/P EST LOW 20 MIN: CPT | Mod: 25 | Performed by: PHYSICIAN ASSISTANT

## 2020-08-14 PROCEDURE — 17000 DESTRUCT PREMALG LESION: CPT | Performed by: PHYSICIAN ASSISTANT

## 2020-08-14 PROCEDURE — 17003 DESTRUCT PREMALG LES 2-14: CPT | Performed by: PHYSICIAN ASSISTANT

## 2020-08-14 RX ORDER — FLUOROURACIL 50 MG/G
CREAM TOPICAL 2 TIMES DAILY
Qty: 40 G | Refills: 1 | Status: SHIPPED | OUTPATIENT
Start: 2020-08-14 | End: 2020-09-25

## 2020-08-14 NOTE — LETTER
8/14/2020         RE: Marcelo Hawkins  [address] [address] [place] [id number]        Dear Colleague,    Thank you for referring your patient, Marcelo Hawkins, to the [facility]. Please see a copy of my visit note below.    Marcelo Hawkins is a 55 year old year old male patient here today for recheck after efudex. He notes he treated chest and back of neck with efudex. He reports that it did a great job. No new concerning areas. Patient has no other skin complaints today.  Remainder of the HPI, Meds, PMH, Allergies, FH, and SH was reviewed in chart.    Pertinent Hx:History of SCIS  Past Medical History:   Diagnosis Date     Actinic keratosis      Malignant neoplasm of thyroid gland (H) 1989    papillary-s/p thyroidectomy, residual hypothyroidism     Other and unspecified hyperlipidemia      Postsurgical hypothyroidism     s/p thryoidectomy      Squamous cell carcinoma        Past Surgical History:   Procedure Laterality Date     COLONOSCOPY N/A 3/17/2016    Procedure: COLONOSCOPY;  Surgeon: Jovanny Fuller MD;  Location: [facility]     ESOPHAGOSCOPY, GASTROSCOPY, DUODENOSCOPY (EGD), COMBINED N/A 5/18/2017    Procedure: COMBINED ESOPHAGOSCOPY, GASTROSCOPY, DUODENOSCOPY (EGD), BIOPSY SINGLE OR MULTIPLE;  Gastroscopy  ;  Surgeon: Jovanny Fuller MD;  Location: [facility]     SURGICAL HISTORY OF -       hernia times three-right inguinal     SURGICAL HISTORY OF -   1989    thyroidectomy        Family History   Problem Relation Age of Onset     Lipids Mother      Hypertension Father      Cancer Father         sweat gland cancer     Other - See Comments Father 77        IPF - ideomatic pulmonary fibrosis     Lipids Brother      Thyroid Disease Brother      Arthritis Maternal Grandfather      Heart Disease Paternal Grandmother      Melanoma No family hx of        Social History     Socioeconomic History     Marital status:      Spouse name: Merline     Number of children: 2     Years of education: Not on file      Highest education level: Not on file   Occupational History     Employer: [employer]   Social Needs     Financial resource strain: Not on file     Food insecurity     Worry: Not on file     Inability: Not on file     Transportation needs     Medical: Not on file     Non-medical: Not on file   Tobacco Use     Smoking status: Never Smoker     Smokeless tobacco: Never Used   Substance and Sexual Activity     Alcohol use: Yes     Comment: 2 every couple of months     Drug use: No     Sexual activity: Yes     Partners: Female     Birth control/protection: Surgical   Lifestyle     Physical activity     Days per week: Not on file     Minutes per session: Not on file     Stress: Not on file   Relationships     Social connections     Talks on phone: Not on file     Gets together: Not on file     Attends Restorationism service: Not on file     Active member of club or organization: Not on file     Attends meetings of clubs or organizations: Not on file     Relationship status: Not on file     Intimate partner violence     Fear of current or ex partner: Not on file     Emotionally abused: Not on file     Physically abused: Not on file     Forced sexual activity: Not on file   Other Topics Concern      Service Not Asked     Blood Transfusions Not Asked     Caffeine Concern Yes     Comment: 1 coffee daily     Occupational Exposure Not Asked     Hobby Hazards Not Asked     Sleep Concern Not Asked     Stress Concern Not Asked     Weight Concern Not Asked     Special Diet Not Asked     Back Care Not Asked     Exercise Yes     Comment: walks & Running-3 x days a week     Bike Helmet Not Asked     Seat Belt Yes     Self-Exams Yes     Parent/sibling w/ CABG, MI or angioplasty before 65F 55M? No   Social History Narrative     Not on file       Outpatient Encounter Medications as of 8/14/2020   Medication Sig Dispense Refill     fluorouracil (EFUDEX) 5 % external cream Apply topically 2 times daily For 14 days 40 g 1      calcium-vitamin D (CALCIUM 600 + D) 600-400 MG-UNIT per tablet Take 1 tablet by mouth 2 times daily. 100 tablet 3     loratadine (CLARITIN) 10 MG tablet Take 1 tablet (10 mg) by mouth daily 90 tablet 3     Multiple Vitamins-Minerals (MULTIVITAMIN OR)        ORDER FOR DME Superfeet inserts 2 Device 0     RaNITidine HCl (RANITIDINE 75 PO) Take 75 mg by mouth daily       sildenafil (REVATIO) 20 MG tablet Take 1 tablet (20 mg) by mouth daily as needed 30 min to 4 hrs before sex. Do not use with nitroglycerin, terazosin or doxazosin. 30 tablet 6     simvastatin (ZOCOR) 20 MG tablet Take 1 tablet (20 mg) by mouth At Bedtime 90 tablet 3     SYNTHROID 112 MCG tablet Take 1 tablet (to make 162 mcg) 2 days a week alternating with taking 175 mcg 5 days per week. BRAND NAME- Do NOT SUBSTITUTE 24 tablet 3     SYNTHROID 175 MCG tablet TAKE 1 TABLET BY MOUTH 5  DAYS A WEEK ALTERNATING  WITH TAKING 162 MCG 2 DAYS  PER WEEK 60 tablet 3     SYNTHROID 50 MCG tablet Take 1 tablet (to make 162 mcg) 2 days a week alternating with taking 175 mcg 5 days per week. BRAND NAME- Do NOT SUBSTITUTE 24 tablet 3     [DISCONTINUED] fluorouracil (EFUDEX) 5 % external cream Apply topically 2 times daily For 14 days 40 g 1     No facility-administered encounter medications on file as of 8/14/2020.              Review Of Systems  Skin: As above  Eyes: negative  Ears/Nose/Throat: negative  Respiratory: No shortness of breath, dyspnea on exertion, cough, or hemoptysis  Cardiovascular: negative  Gastrointestinal: negative  Genitourinary: negative  Musculoskeletal: negative  Neurologic: negative  Psychiatric: negative  Hematologic/Lymphatic/Immunologic: negative  Endocrine: negative      O:   NAD, WDWN, Alert & Oriented, Mood & Affect wnl, Vitals stable   Here today alone   BP (!) 142/79   Pulse 83   Ht 1.829 m (6')   BMI 25.36 kg/m     General appearance normal   Vitals stable   Alert, oriented and in no acute distress     Gritty papules on left upper  arm and left sideburn   Stuck on papules and brown macules on trunk and ext   Red papules on trunk  Flesh colored papules on trunk     The remainder of skin exam is normal.       Eyes: Conjunctivae/lids:Normal     ENT: Lips: normal    MSK:Normal    Cardiovascular: peripheral edema none    Pulm: Breathing Normal    Neuro/Psych: Orientation:Alert and Orientedx3 ; Mood/Affect:normal   A/P:  1. Actinic keratosis on left sideburn and left upper arm x 2  LN2:  Treated with LN2 for 5s for 1-2 cycles. Warned risks of blistering, pain, pigment change, scarring, and incomplete resolution.  Advised patient to return if lesions do not completely resolve.  Wound care sheet given.  Can used efudex again in the fall to treat face.   2. Seborrheic keratosis, lentigo, angioma, benign nevi, ephelides  BENIGN LESIONS DISCUSSED WITH PATIENT:  I discussed the specifics of tumor, prognosis, and genetics of benign lesions.  I explained that treatment of these lesions would be purely cosmetic and not medically neccessary.  I discussed with patient different removal options including excision, cautery and /or laser.      Nature and genetics of benign skin lesions dicussed with patient.  Signs and Symptoms of skin cancer discussed with patient.  ABCDEs of melanoma reviewed with patient.  Patient encouraged to perform monthly skin exams.  UV precautions reviewed with patient.  Risks of non-melanoma skin cancer discussed with patient   Return to clinic in one year or sooner if needed.   Marcelo to follow up with Primary Care provider regarding elevated blood pressure.        Again, thank you for allowing me to participate in the care of your patient.        Sincerely,        Stefani Clifton PA-C

## 2020-08-14 NOTE — NURSING NOTE
Initial BP (!) 142/79   Pulse 83   Ht 1.829 m (6')   BMI 25.36 kg/m   Estimated body mass index is 25.36 kg/m  as calculated from the following:    Height as of this encounter: 1.829 m (6').    Weight as of 12/20/18: 84.8 kg (187 lb). .

## 2020-08-14 NOTE — PROGRESS NOTES
Marcelo Hawkins is a 55 year old year old male patient here today for recheck after efudex. He notes he treated chest and back of neck with efudex. He reports that it did a great job. No new concerning areas. Patient has no other skin complaints today.  Remainder of the HPI, Meds, PMH, Allergies, FH, and SH was reviewed in chart.    Pertinent Hx:History of SCIS  Past Medical History:   Diagnosis Date     Actinic keratosis      Malignant neoplasm of thyroid gland (H) 1989    papillary-s/p thyroidectomy, residual hypothyroidism     Other and unspecified hyperlipidemia      Postsurgical hypothyroidism     s/p thryoidectomy      Squamous cell carcinoma        Past Surgical History:   Procedure Laterality Date     COLONOSCOPY N/A 3/17/2016    Procedure: COLONOSCOPY;  Surgeon: Jovanny Fuller MD;  Location: [facility]     ESOPHAGOSCOPY, GASTROSCOPY, DUODENOSCOPY (EGD), COMBINED N/A 5/18/2017    Procedure: COMBINED ESOPHAGOSCOPY, GASTROSCOPY, DUODENOSCOPY (EGD), BIOPSY SINGLE OR MULTIPLE;  Gastroscopy  ;  Surgeon: Jovanny Fuller MD;  Location: [facility]     SURGICAL HISTORY OF -       hernia times three-right inguinal     SURGICAL HISTORY OF -   1989    thyroidectomy        Family History   Problem Relation Age of Onset     Lipids Mother      Hypertension Father      Cancer Father         sweat gland cancer     Other - See Comments Father 77        IPF - ideomatic pulmonary fibrosis     Lipids Brother      Thyroid Disease Brother      Arthritis Maternal Grandfather      Heart Disease Paternal Grandmother      Melanoma No family hx of        Social History     Socioeconomic History     Marital status:      Spouse name: Merline     Number of children: 2     Years of education: Not on file     Highest education level: Not on file   Occupational History     Employer: [employer]   Social Needs     Financial resource strain: Not on file     Food insecurity     Worry: Not on file     Inability: Not on file     Transportation needs      Medical: Not on file     Non-medical: Not on file   Tobacco Use     Smoking status: Never Smoker     Smokeless tobacco: Never Used   Substance and Sexual Activity     Alcohol use: Yes     Comment: 2 every couple of months     Drug use: No     Sexual activity: Yes     Partners: Female     Birth control/protection: Surgical   Lifestyle     Physical activity     Days per week: Not on file     Minutes per session: Not on file     Stress: Not on file   Relationships     Social connections     Talks on phone: Not on file     Gets together: Not on file     Attends Bahai service: Not on file     Active member of club or organization: Not on file     Attends meetings of clubs or organizations: Not on file     Relationship status: Not on file     Intimate partner violence     Fear of current or ex partner: Not on file     Emotionally abused: Not on file     Physically abused: Not on file     Forced sexual activity: Not on file   Other Topics Concern      Service Not Asked     Blood Transfusions Not Asked     Caffeine Concern Yes     Comment: 1 coffee daily     Occupational Exposure Not Asked     Hobby Hazards Not Asked     Sleep Concern Not Asked     Stress Concern Not Asked     Weight Concern Not Asked     Special Diet Not Asked     Back Care Not Asked     Exercise Yes     Comment: walks & Running-3 x days a week     Bike Helmet Not Asked     Seat Belt Yes     Self-Exams Yes     Parent/sibling w/ CABG, MI or angioplasty before 65F 55M? No   Social History Narrative     Not on file       Outpatient Encounter Medications as of 8/14/2020   Medication Sig Dispense Refill     fluorouracil (EFUDEX) 5 % external cream Apply topically 2 times daily For 14 days 40 g 1     calcium-vitamin D (CALCIUM 600 + D) 600-400 MG-UNIT per tablet Take 1 tablet by mouth 2 times daily. 100 tablet 3     loratadine (CLARITIN) 10 MG tablet Take 1 tablet (10 mg) by mouth daily 90 tablet 3     Multiple Vitamins-Minerals (MULTIVITAMIN OR)         ORDER FOR DME Superfeet inserts 2 Device 0     RaNITidine HCl (RANITIDINE 75 PO) Take 75 mg by mouth daily       sildenafil (REVATIO) 20 MG tablet Take 1 tablet (20 mg) by mouth daily as needed 30 min to 4 hrs before sex. Do not use with nitroglycerin, terazosin or doxazosin. 30 tablet 6     simvastatin (ZOCOR) 20 MG tablet Take 1 tablet (20 mg) by mouth At Bedtime 90 tablet 3     SYNTHROID 112 MCG tablet Take 1 tablet (to make 162 mcg) 2 days a week alternating with taking 175 mcg 5 days per week. BRAND NAME- Do NOT SUBSTITUTE 24 tablet 3     SYNTHROID 175 MCG tablet TAKE 1 TABLET BY MOUTH 5  DAYS A WEEK ALTERNATING  WITH TAKING 162 MCG 2 DAYS  PER WEEK 60 tablet 3     SYNTHROID 50 MCG tablet Take 1 tablet (to make 162 mcg) 2 days a week alternating with taking 175 mcg 5 days per week. BRAND NAME- Do NOT SUBSTITUTE 24 tablet 3     [DISCONTINUED] fluorouracil (EFUDEX) 5 % external cream Apply topically 2 times daily For 14 days 40 g 1     No facility-administered encounter medications on file as of 8/14/2020.              Review Of Systems  Skin: As above  Eyes: negative  Ears/Nose/Throat: negative  Respiratory: No shortness of breath, dyspnea on exertion, cough, or hemoptysis  Cardiovascular: negative  Gastrointestinal: negative  Genitourinary: negative  Musculoskeletal: negative  Neurologic: negative  Psychiatric: negative  Hematologic/Lymphatic/Immunologic: negative  Endocrine: negative      O:   NAD, WDWN, Alert & Oriented, Mood & Affect wnl, Vitals stable   Here today alone   BP (!) 142/79   Pulse 83   Ht 1.829 m (6')   BMI 25.36 kg/m     General appearance normal   Vitals stable   Alert, oriented and in no acute distress     Gritty papules on left upper arm and left sideburn   Stuck on papules and brown macules on trunk and ext   Red papules on trunk  Flesh colored papules on trunk     The remainder of skin exam is normal.       Eyes: Conjunctivae/lids:Normal     ENT: Lips:  normal    MSK:Normal    Cardiovascular: peripheral edema none    Pulm: Breathing Normal    Neuro/Psych: Orientation:Alert and Orientedx3 ; Mood/Affect:normal   A/P:  1. Actinic keratosis on left sideburn and left upper arm x 2  LN2:  Treated with LN2 for 5s for 1-2 cycles. Warned risks of blistering, pain, pigment change, scarring, and incomplete resolution.  Advised patient to return if lesions do not completely resolve.  Wound care sheet given.  Can used efudex again in the fall to treat face.   2. Seborrheic keratosis, lentigo, angioma, benign nevi, ephelides  BENIGN LESIONS DISCUSSED WITH PATIENT:  I discussed the specifics of tumor, prognosis, and genetics of benign lesions.  I explained that treatment of these lesions would be purely cosmetic and not medically neccessary.  I discussed with patient different removal options including excision, cautery and /or laser.      Nature and genetics of benign skin lesions dicussed with patient.  Signs and Symptoms of skin cancer discussed with patient.  ABCDEs of melanoma reviewed with patient.  Patient encouraged to perform monthly skin exams.  UV precautions reviewed with patient.  Risks of non-melanoma skin cancer discussed with patient   Return to clinic in one year or sooner if needed.   Marcelo to follow up with Primary Care provider regarding elevated blood pressure.

## 2020-09-25 DIAGNOSIS — L57.0 ACTINIC KERATOSIS: ICD-10-CM

## 2020-09-25 RX ORDER — FLUOROURACIL 50 MG/G
CREAM TOPICAL 2 TIMES DAILY
Qty: 40 G | Refills: 1 | Status: SHIPPED | OUTPATIENT
Start: 2020-09-25 | End: 2020-12-11

## 2020-09-25 NOTE — TELEPHONE ENCOUNTER
Requested Prescriptions   Pending Prescriptions Disp Refills     fluorouracil (EFUDEX) 5 % external cream 40 g 1     Sig: Apply topically 2 times daily For 14 days       There is no refill protocol information for this order        Last office visit: 8/14/2020 with prescribing provider:  JEREMIAH Kaye   Future Office Visit:          Denise Behrendt  Specialty CSS

## 2020-11-29 ENCOUNTER — HEALTH MAINTENANCE LETTER (OUTPATIENT)
Age: 55
End: 2020-11-29

## 2020-12-11 DIAGNOSIS — L57.0 ACTINIC KERATOSIS: ICD-10-CM

## 2020-12-11 RX ORDER — FLUOROURACIL 50 MG/G
CREAM TOPICAL
Qty: 0.0001 G | Refills: 0 | Status: SHIPPED | OUTPATIENT
Start: 2020-12-11 | End: 2020-12-18

## 2020-12-11 NOTE — TELEPHONE ENCOUNTER
Requested Prescriptions   Pending Prescriptions Disp Refills     fluorouracil (EFUDEX) 5 % external cream 40 g 1     Sig: Apply topically 2 times daily For 14 days       There is no refill protocol information for this order        Last office visit: 8/14/2020 with prescribing provider:  JEREMIAH Kaye   Future Office Visit:   Next 5 appointments (look out 90 days)    Dec 18, 2020  8:30 AM  Return Visit with Stefani Kaye PA-C  [facility] ([facility]) [address] [id number] [phone]               Denise Behrendt  Specialty CSS

## 2020-12-11 NOTE — TELEPHONE ENCOUNTER
Left message for patient to call back clinic. Need to know if pt requested refill or if pharmacy did.   Tessa LARIOS RN BSN PHN  Specialty Clinics

## 2020-12-18 ENCOUNTER — OFFICE VISIT (OUTPATIENT)
Dept: DERMATOLOGY | Facility: CLINIC | Age: 55
End: 2020-12-18
Payer: COMMERCIAL

## 2020-12-18 ENCOUNTER — OFFICE VISIT (OUTPATIENT)
Dept: FAMILY MEDICINE | Facility: CLINIC | Age: 55
End: 2020-12-18
Payer: COMMERCIAL

## 2020-12-18 VITALS
RESPIRATION RATE: 16 BRPM | WEIGHT: 181.6 LBS | BODY MASS INDEX: 24.6 KG/M2 | DIASTOLIC BLOOD PRESSURE: 88 MMHG | HEIGHT: 72 IN | SYSTOLIC BLOOD PRESSURE: 138 MMHG | HEART RATE: 77 BPM | TEMPERATURE: 97.4 F | OXYGEN SATURATION: 98 %

## 2020-12-18 VITALS — OXYGEN SATURATION: 98 % | SYSTOLIC BLOOD PRESSURE: 138 MMHG | DIASTOLIC BLOOD PRESSURE: 88 MMHG | HEART RATE: 77 BPM

## 2020-12-18 DIAGNOSIS — Z86.007 HISTORY OF SQUAMOUS CELL CARCINOMA IN SITU: ICD-10-CM

## 2020-12-18 DIAGNOSIS — N52.8 OTHER MALE ERECTILE DYSFUNCTION: ICD-10-CM

## 2020-12-18 DIAGNOSIS — L81.4 LENTIGO: ICD-10-CM

## 2020-12-18 DIAGNOSIS — E78.5 HYPERLIPIDEMIA LDL GOAL <130: ICD-10-CM

## 2020-12-18 DIAGNOSIS — E89.0 POSTSURGICAL HYPOTHYROIDISM: ICD-10-CM

## 2020-12-18 DIAGNOSIS — Z23 NEED FOR VACCINATION: ICD-10-CM

## 2020-12-18 DIAGNOSIS — L82.1 SEBORRHEIC KERATOSIS: Primary | ICD-10-CM

## 2020-12-18 DIAGNOSIS — Z23 NEED FOR PROPHYLACTIC VACCINATION AND INOCULATION AGAINST INFLUENZA: ICD-10-CM

## 2020-12-18 DIAGNOSIS — C73 PAPILLARY CARCINOMA OF THYROID (H): ICD-10-CM

## 2020-12-18 DIAGNOSIS — Z00.00 ANNUAL PHYSICAL EXAM: Primary | ICD-10-CM

## 2020-12-18 DIAGNOSIS — L57.0 ACTINIC KERATOSIS: ICD-10-CM

## 2020-12-18 LAB
ANION GAP SERPL CALCULATED.3IONS-SCNC: 2 MMOL/L (ref 3–14)
BUN SERPL-MCNC: 16 MG/DL (ref 7–30)
CALCIUM SERPL-MCNC: 9.2 MG/DL (ref 8.5–10.1)
CHLORIDE SERPL-SCNC: 105 MMOL/L (ref 94–109)
CHOLEST SERPL-MCNC: 196 MG/DL
CO2 SERPL-SCNC: 31 MMOL/L (ref 20–32)
CREAT SERPL-MCNC: 0.9 MG/DL (ref 0.66–1.25)
GFR SERPL CREATININE-BSD FRML MDRD: >90 ML/MIN/{1.73_M2}
GLUCOSE SERPL-MCNC: 101 MG/DL (ref 70–99)
HDLC SERPL-MCNC: 70 MG/DL
LDLC SERPL CALC-MCNC: 104 MG/DL
NONHDLC SERPL-MCNC: 126 MG/DL
POTASSIUM SERPL-SCNC: 3.9 MMOL/L (ref 3.4–5.3)
PSA SERPL-ACNC: 2.32 UG/L (ref 0–4)
SODIUM SERPL-SCNC: 138 MMOL/L (ref 133–144)
TRIGL SERPL-MCNC: 112 MG/DL
TSH SERPL DL<=0.005 MIU/L-ACNC: 0.76 MU/L (ref 0.4–4)

## 2020-12-18 PROCEDURE — 99213 OFFICE O/P EST LOW 20 MIN: CPT | Performed by: PHYSICIAN ASSISTANT

## 2020-12-18 PROCEDURE — G0103 PSA SCREENING: HCPCS | Performed by: NURSE PRACTITIONER

## 2020-12-18 PROCEDURE — 90682 RIV4 VACC RECOMBINANT DNA IM: CPT | Performed by: NURSE PRACTITIONER

## 2020-12-18 PROCEDURE — 80061 LIPID PANEL: CPT | Performed by: NURSE PRACTITIONER

## 2020-12-18 PROCEDURE — 84443 ASSAY THYROID STIM HORMONE: CPT | Performed by: NURSE PRACTITIONER

## 2020-12-18 PROCEDURE — 36415 COLL VENOUS BLD VENIPUNCTURE: CPT | Performed by: NURSE PRACTITIONER

## 2020-12-18 PROCEDURE — 80048 BASIC METABOLIC PNL TOTAL CA: CPT | Performed by: NURSE PRACTITIONER

## 2020-12-18 PROCEDURE — 99396 PREV VISIT EST AGE 40-64: CPT | Mod: 25 | Performed by: NURSE PRACTITIONER

## 2020-12-18 PROCEDURE — 90750 HZV VACC RECOMBINANT IM: CPT | Performed by: NURSE PRACTITIONER

## 2020-12-18 PROCEDURE — 90472 IMMUNIZATION ADMIN EACH ADD: CPT | Performed by: NURSE PRACTITIONER

## 2020-12-18 PROCEDURE — 90471 IMMUNIZATION ADMIN: CPT | Performed by: NURSE PRACTITIONER

## 2020-12-18 PROCEDURE — 99214 OFFICE O/P EST MOD 30 MIN: CPT | Mod: 25 | Performed by: NURSE PRACTITIONER

## 2020-12-18 RX ORDER — LEVOTHYROXINE SODIUM 50 MCG
TABLET ORAL
Qty: 24 TABLET | Refills: 3 | Status: SHIPPED | OUTPATIENT
Start: 2020-12-18 | End: 2021-07-13

## 2020-12-18 RX ORDER — SILDENAFIL CITRATE 20 MG/1
TABLET ORAL
Qty: 30 TABLET | Refills: 11 | Status: SHIPPED | OUTPATIENT
Start: 2020-12-18 | End: 2021-10-26

## 2020-12-18 RX ORDER — FLUOROURACIL 50 MG/G
CREAM TOPICAL
Qty: 80 G | Refills: 3 | Status: SHIPPED | OUTPATIENT
Start: 2020-12-18 | End: 2021-03-29

## 2020-12-18 RX ORDER — LEVOTHYROXINE SODIUM 112 MCG
TABLET ORAL
Qty: 24 TABLET | Refills: 3 | Status: SHIPPED | OUTPATIENT
Start: 2020-12-18 | End: 2021-07-13

## 2020-12-18 RX ORDER — LEVOTHYROXINE SODIUM 175 MCG
TABLET ORAL
Qty: 60 TABLET | Refills: 3 | Status: SHIPPED | OUTPATIENT
Start: 2020-12-18 | End: 2021-07-13

## 2020-12-18 RX ORDER — SIMVASTATIN 20 MG
20 TABLET ORAL AT BEDTIME
Qty: 90 TABLET | Refills: 3 | Status: SHIPPED | OUTPATIENT
Start: 2020-12-18 | End: 2021-07-13

## 2020-12-18 ASSESSMENT — MIFFLIN-ST. JEOR: SCORE: 1692.76

## 2020-12-18 NOTE — PROGRESS NOTES
Marcelo Hawkins is an extremely pleasant 55 year old year old male patient here today for recheck after efudex. He is still treat his arm with efudex. He note that there is not as many reactive areas as last year.   Patient has no other skin complaints today.  Remainder of the HPI, Meds, PMH, Allergies, FH, and SH was reviewed in chart.    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-s/p thyroidectomy, residual hypothyroidism     Other and unspecified hyperlipidemia      Postsurgical hypothyroidism     s/p thryoidectomy      Squamous cell carcinoma        Past Surgical History:   Procedure Laterality Date     COLONOSCOPY N/A 3/17/2016    Procedure: COLONOSCOPY;  Surgeon: Jovanny Fuller MD;  Location: [facility]     ESOPHAGOSCOPY, GASTROSCOPY, DUODENOSCOPY (EGD), COMBINED N/A 5/18/2017    Procedure: COMBINED ESOPHAGOSCOPY, GASTROSCOPY, DUODENOSCOPY (EGD), BIOPSY SINGLE OR MULTIPLE;  Gastroscopy  ;  Surgeon: Jovanny Fuller MD;  Location: [facility]     SURGICAL HISTORY OF -       hernia times three-right inguinal     SURGICAL HISTORY OF -   1989    thyroidectomy        Family History   Problem Relation Age of Onset     Lipids Mother      Hypertension Father      Cancer Father         sweat gland cancer     Other - See Comments Father 77        IPF - ideomatic pulmonary fibrosis     Lipids Brother      Thyroid Disease Brother      Arthritis Maternal Grandfather      Heart Disease Paternal Grandmother      Melanoma No family hx of        Social History     Socioeconomic History     Marital status:      Spouse name: Merline     Number of children: 2     Years of education: Not on file     Highest education level: Not on file   Occupational History     Employer: [employer]   Social Needs     Financial resource strain: Not on file     Food insecurity     Worry: Not on file     Inability: Not on file     Transportation  needs     Medical: Not on file     Non-medical: Not on file   Tobacco Use     Smoking status: Never Smoker     Smokeless tobacco: Never Used   Substance and Sexual Activity     Alcohol use: Yes     Comment: 2 every couple of months     Drug use: No     Sexual activity: Yes     Partners: Female     Birth control/protection: None   Lifestyle     Physical activity     Days per week: Not on file     Minutes per session: Not on file     Stress: Not on file   Relationships     Social connections     Talks on phone: Not on file     Gets together: Not on file     Attends Alevism service: Not on file     Active member of club or organization: Not on file     Attends meetings of clubs or organizations: Not on file     Relationship status: Not on file     Intimate partner violence     Fear of current or ex partner: Not on file     Emotionally abused: Not on file     Physically abused: Not on file     Forced sexual activity: Not on file   Other Topics Concern      Service Not Asked     Blood Transfusions Not Asked     Caffeine Concern Yes     Comment: 1 coffee daily     Occupational Exposure Not Asked     Hobby Hazards Not Asked     Sleep Concern Not Asked     Stress Concern Not Asked     Weight Concern Not Asked     Special Diet Not Asked     Back Care Not Asked     Exercise Yes     Comment: walks & Running-3 x days a week     Bike Helmet Not Asked     Seat Belt Yes     Self-Exams Yes     Parent/sibling w/ CABG, MI or angioplasty before 65F 55M? No   Social History Narrative     Not on file       Outpatient Encounter Medications as of 12/18/2020   Medication Sig Dispense Refill     calcium-vitamin D (CALCIUM 600 + D) 600-400 MG-UNIT per tablet Take 1 tablet by mouth 2 times daily. 100 tablet 3     fluorouracil (EFUDEX) 5 % external cream Apply twice daily for 4 weeks to arms, neck, chest, forehead and ears. (dispense 80 g) 80 g 3     Multiple Vitamins-Minerals (MULTIVITAMIN OR)        ORDER FOR [facility] Superfeet inserts  2 Device 0     sildenafil (REVATIO) 20 MG tablet 30 min to 4 hrs before sex. Do not use with nitroglycerin, terazosin or doxazosin. 30 tablet 11     simvastatin (ZOCOR) 20 MG tablet Take 1 tablet (20 mg) by mouth At Bedtime 90 tablet 3     [DISCONTINUED] SYNTHROID 112 MCG tablet Take 1 tablet (to make 162 mcg) 2 days a week alternating with taking 175 mcg 5 days per week. BRAND NAME- Do NOT SUBSTITUTE 24 tablet 3     [DISCONTINUED] SYNTHROID 175 MCG tablet TAKE 1 TABLET BY MOUTH 5  DAYS A WEEK ALTERNATING  WITH TAKING 162 MCG 2 DAYS  PER WEEK 60 tablet 3     [DISCONTINUED] SYNTHROID 50 MCG tablet Take 1 tablet (to make 162 mcg) 2 days a week alternating with taking 175 mcg 5 days per week. BRAND NAME- Do NOT SUBSTITUTE 24 tablet 3     loratadine (CLARITIN) 10 MG tablet Take 1 tablet (10 mg) by mouth daily (Patient not taking: Reported on 12/18/2020) 90 tablet 3     [DISCONTINUED] fluorouracil (EFUDEX) 5 % external cream DENIED- PT STATES DID NOT REQUEST (Patient not taking: Reported on 12/18/2020) 0.0001 g 0     [DISCONTINUED] sildenafil (REVATIO) 20 MG tablet Take 1 tablet (20 mg) by mouth daily as needed 30 min to 4 hrs before sex. Do not use with nitroglycerin, terazosin or doxazosin. (Patient not taking: Reported on 12/18/2020) 30 tablet 6     [DISCONTINUED] simvastatin (ZOCOR) 20 MG tablet Take 1 tablet (20 mg) by mouth At Bedtime 90 tablet 3     No facility-administered encounter medications on file as of 12/18/2020.              Review Of Systems  Skin: As above  Eyes: negative  Ears/Nose/Throat: negative  Respiratory: No shortness of breath, dyspnea on exertion, cough      O:   NAD, WDWN, Alert & Oriented, Mood & Affect wnl, Vitals stable   Here today alone   /88   Pulse 77   SpO2 98%    General appearance normal   Vitals stable   Alert, oriented and in no acute distress     Stuck on papules and brown macules on trunk and ext   Rare gritty papules on arms   Sun damage on neck, chest, face     Eyes:  Conjunctivae/lids:Normal     ENT: Lips: normal    MSK:Normal    Pulm: Breathing Normal    Neuro/Psych: Orientation:Alert and Orientedx3 ; Mood/Affect:normal   A/P:  1. Actinic keratoses on face, neck, and chest   Continue efudex for another 2 weeks on arms. Then can treat, chest, neck, ears, forehead with it.   2. Seborrheic keratosis, lentigo, History of SCC  BENIGN LESIONS DISCUSSED WITH PATIENT:  I discussed the specifics of tumor, prognosis, and genetics of benign lesions.  I explained that treatment of these lesions would be purely cosmetic and not medically neccessary.  I discussed with patient different removal options including excision, cautery and /or laser.      Nature and genetics of benign skin lesions dicussed with patient.  Signs and Symptoms of skin cancer discussed with patient.  ABCDEs of melanoma reviewed with patient.  Patient encouraged to perform monthly skin exams.  UV precautions reviewed with patient.  Risks of non-melanoma skin cancer discussed with patient   Return to clinic in 6 months.

## 2020-12-18 NOTE — PROGRESS NOTES
SUBJECTIVE:   CC: Marcelo Hawkins is an 55 year old male who presents for preventative health visit.     Chief Complaint   Patient presents with     Physical     Blood Draw     Fasting for labs      Refill Request     Synthroid, Simvastatin      Patient has been advised of split billing requirements and indicates understanding: Yes  Healthy Habits:     Getting at least 3 servings of Calcium per day:  Yes    Bi-annual eye exam:  Yes    Dental care twice a year:  Yes    Sleep apnea or symptoms of sleep apnea:  None    Diet:  Regular (no restrictions)    Frequency of exercise:  None    Duration of exercise:  N/A    Taking medications regularly:  Yes    Barriers to taking medications:  None    Medication side effects:  None    PHQ-2 Total Score: 0    Additional concerns today:  Yes (med refills )    Hypothyroidism Follow-up      Since last visit, patient describes the following symptoms: Weight stable, no hair loss, no skin changes, no constipation, no loose stools      Today's PHQ-2 Score:   PHQ-2 ( 1999 [employer]) 12/18/2020   Q1: Little interest or pleasure in doing things 0   Q2: Feeling down, depressed or hopeless 0   PHQ-2 Score 0   Q1: Little interest or pleasure in doing things -   Q2: Feeling down, depressed or hopeless -   PHQ-2 Score -       Abuse: Current or Past(Physical, Sexual or Emotional)- No  Do you feel safe in your environment? Yes    Have you ever done Advance Care Planning? (For example, a Health Directive, POLST, or a discussion with a medical provider or your loved ones about your wishes): Yes, advance care planning is on file.    Social History     Tobacco Use     Smoking status: Never Smoker     Smokeless tobacco: Never Used   Substance Use Topics     Alcohol use: Yes     Comment: 2 every couple of months     If you drink alcohol do you typically have >3 drinks per day or >7 drinks per week? No    Alcohol Use 5/8/2017   Prescreen: >3 drinks/day or >7 drinks/week? not really     PSA   Date Value Ref  Range Status   08/23/2018 1.62 0 - 4 ug/L Final     Comment:     Assay Method:  Chemiluminescence using Siemens Vista analyzer       Reviewed orders with patient. Reviewed health maintenance and updated orders accordingly - Yes  Lab work is in process  Labs reviewed in EPIC  BP Readings from Last 3 Encounters:   12/18/20 138/88   12/18/20 138/88   08/14/20 (!) 142/79    Wt Readings from Last 3 Encounters:   12/18/20 82.4 kg (181 lb 9.6 oz)   12/20/18 84.8 kg (187 lb)   12/06/18 84.8 kg (187 lb)                    Reviewed and updated as needed this visit by clinical staff  Tobacco  Allergies  Meds   Med Hx  Surg Hx  Fam Hx  Soc Hx        Reviewed and updated as needed this visit by Provider                    Review of Systems  CONSTITUTIONAL: NEGATIVE for fever, chills, change in weight  INTEGUMENTARY/SKIN: NEGATIVE for worrisome rashes, moles or lesions  EYES: NEGATIVE for vision changes or irritation  ENT: NEGATIVE for ear, mouth and throat problems  RESP: NEGATIVE for significant cough or SOB  CV: NEGATIVE for chest pain, palpitations or peripheral edema  GI: NEGATIVE for nausea, abdominal pain, heartburn, or change in bowel habits   male: negative for dysuria, hematuria, decreased urinary stream, erectile dysfunction, urethral discharge  MUSCULOSKELETAL: NEGATIVE for significant arthralgias or myalgia  NEURO: NEGATIVE for weakness, dizziness or paresthesias  PSYCHIATRIC: NEGATIVE for changes in mood or affect    OBJECTIVE:   There were no vitals taken for this visit.    Physical Exam  GENERAL: healthy, alert and no distress  EYES: Eyes grossly normal to inspection, PERRL and conjunctivae and sclerae normal  HENT: ear canals and TM's normal, nose and mouth without ulcers or lesions  NECK: no adenopathy, no asymmetry, masses, or scars and thyroid normal to palpation  RESP: lungs clear to auscultation - no rales, rhonchi or wheezes  CV: regular rate and rhythm, normal S1 S2, no S3 or S4, no murmur, click  or rub, no peripheral edema and peripheral pulses strong  RECTAL (male): normal sphincter tone, no rectal masses, prostate normal size, smooth, nontender without nodules or masses  MS: no gross musculoskeletal defects noted, no edema  SKIN: no suspicious lesions or rashes  NEURO: Normal strength and tone, mentation intact and speech normal  PSYCH: mentation appears normal, affect normal/bright    Diagnostic Test Results:  Labs reviewed in Epic  Results for orders placed or performed in visit on 12/18/20 (from the past 24 hour(s))   Lipid panel reflex to direct LDL Fasting   Result Value Ref Range    Cholesterol 196 <200 mg/dL    Triglycerides 112 <150 mg/dL    HDL Cholesterol 70 >39 mg/dL    LDL Cholesterol Calculated 104 (H) <100 mg/dL    Non HDL Cholesterol 126 <130 mg/dL   TSH with free T4 reflex   Result Value Ref Range    TSH 0.76 0.40 - 4.00 mU/L   PSA, screen   Result Value Ref Range    PSA 2.32 0 - 4 ug/L   Basic metabolic panel  (Ca, Cl, CO2, Creat, Gluc, K, Na, BUN)   Result Value Ref Range    Sodium 138 133 - 144 mmol/L    Potassium 3.9 3.4 - 5.3 mmol/L    Chloride 105 94 - 109 mmol/L    Carbon Dioxide 31 20 - 32 mmol/L    Anion Gap 2 (L) 3 - 14 mmol/L    Glucose 101 (H) 70 - 99 mg/dL    Urea Nitrogen 16 7 - 30 mg/dL    Creatinine 0.90 0.66 - 1.25 mg/dL    GFR Estimate >90 >60 mL/min/[1.73_m2]    GFR Estimate If Black >90 >60 mL/min/[1.73_m2]    Calcium 9.2 8.5 - 10.1 mg/dL       ASSESSMENT/PLAN:   1. Annual physical exam    - Lipid panel reflex to direct LDL Fasting  - PSA, screen  - Basic metabolic panel  (Ca, Cl, CO2, Creat, Gluc, K, Na, BUN)    2. Postsurgical hypothyroidism  -clinically euthyroid, labs normal, recommended to continue current treatment plan   - TSH with free T4 reflex  - SYNTHROID 112 MCG tablet; Take 1 tablet (to make 162 mcg) 2 days a week alternating with taking 175 mcg 5 days per week. BRAND NAME- Do NOT SUBSTITUTE  Dispense: 24 tablet; Refill: 3  - SYNTHROID 175 MCG tablet; TAKE  1 TABLET BY MOUTH 5  DAYS A WEEK ALTERNATING  WITH TAKING 162 MCG 2 DAYS  PER WEEK  Dispense: 60 tablet; Refill: 3  - SYNTHROID 50 MCG tablet; Take 1 tablet (to make 162 mcg) 2 days a week alternating with taking 175 mcg 5 days per week. BRAND NAME- Do NOT SUBSTITUTE  Dispense: 24 tablet; Refill: 3    3. Hyperlipidemia LDL goal <130    - simvastatin (ZOCOR) 20 MG tablet; Take 1 tablet (20 mg) by mouth At Bedtime  Dispense: 90 tablet; Refill: 3    4. Need for vaccination    - SHINGRIX [07445]    5. Need for prophylactic vaccination and inoculation against influenza    - INFLUENZA QUAD, RECOMBINANT, P-FREE (RIV4) (FLUBLOCK) [87913]    6. Other male erectile dysfunction    - sildenafil (REVATIO) 20 MG tablet; 30 min to 4 hrs before sex. Do not use with nitroglycerin, terazosin or doxazosin.  Dispense: 30 tablet; Refill: 11    7. Papillary carcinoma of thyroid (H)    - SYNTHROID 112 MCG tablet; Take 1 tablet (to make 162 mcg) 2 days a week alternating with taking 175 mcg 5 days per week. BRAND NAME- Do NOT SUBSTITUTE  Dispense: 24 tablet; Refill: 3  - SYNTHROID 175 MCG tablet; TAKE 1 TABLET BY MOUTH 5  DAYS A WEEK ALTERNATING  WITH TAKING 162 MCG 2 DAYS  PER WEEK  Dispense: 60 tablet; Refill: 3  - SYNTHROID 50 MCG tablet; Take 1 tablet (to make 162 mcg) 2 days a week alternating with taking 175 mcg 5 days per week. BRAND NAME- Do NOT SUBSTITUTE  Dispense: 24 tablet; Refill: 3    Patient has been advised of split billing requirements and indicates understanding: Yes  COUNSELING:   Reviewed preventive health counseling, as reflected in patient instructions       Regular exercise       Healthy diet/nutrition       Prostate cancer screening    Estimated body mass index is 25.36 kg/m  as calculated from the following:    Height as of 8/14/20: 1.829 m (6').    Weight as of 12/20/18: 84.8 kg (187 lb).         He reports that he has never smoked. He has never used smokeless tobacco.      Counseling Resources:  ATP IV  Guidelines  Pooled Cohorts Equation Calculator  FRAX Risk Assessment  ICSI Preventive Guidelines  Dietary Guidelines for Americans, 2010  USDA's MyPlate  ASA Prophylaxis  Lung CA Screening    MATTHEW Ferguson [facility]

## 2020-12-18 NOTE — LETTER
12/18/2020         RE: Marcelo Hawkins  [address] [address] [place] [id number]        Dear Colleague,    Thank you for referring your patient, Marcelo Hawkins, to the [facility]. Please see a copy of my visit note below.    Marcelo Hawkins is an extremely pleasant 55 year old year old male patient here today for recheck after efudex. He is still treat his arm with efudex. He note that there is not as many reactive areas as last year.   Patient has no other skin complaints today.  Remainder of the HPI, Meds, PMH, Allergies, FH, and SH was reviewed in chart.    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-s/p thyroidectomy, residual hypothyroidism     Other and unspecified hyperlipidemia      Postsurgical hypothyroidism     s/p thryoidectomy      Squamous cell carcinoma        Past Surgical History:   Procedure Laterality Date     COLONOSCOPY N/A 3/17/2016    Procedure: COLONOSCOPY;  Surgeon: Jovanny Fuller MD;  Location: [facility]     ESOPHAGOSCOPY, GASTROSCOPY, DUODENOSCOPY (EGD), COMBINED N/A 5/18/2017    Procedure: COMBINED ESOPHAGOSCOPY, GASTROSCOPY, DUODENOSCOPY (EGD), BIOPSY SINGLE OR MULTIPLE;  Gastroscopy  ;  Surgeon: Jovanny Fuller MD;  Location: [facility]     SURGICAL HISTORY OF -       hernia times three-right inguinal     SURGICAL HISTORY OF -   1989    thyroidectomy        Family History   Problem Relation Age of Onset     Lipids Mother      Hypertension Father      Cancer Father         sweat gland cancer     Other - See Comments Father 77        IPF - ideomatic pulmonary fibrosis     Lipids Brother      Thyroid Disease Brother      Arthritis Maternal Grandfather      Heart Disease Paternal Grandmother      Melanoma No family hx of        Social History     Socioeconomic History     Marital status:      Spouse name: Merline     Number of children: 2     Years of  education: Not on file     Highest education level: Not on file   Occupational History     Employer: [employer]   Social Needs     Financial resource strain: Not on file     Food insecurity     Worry: Not on file     Inability: Not on file     Transportation needs     Medical: Not on file     Non-medical: Not on file   Tobacco Use     Smoking status: Never Smoker     Smokeless tobacco: Never Used   Substance and Sexual Activity     Alcohol use: Yes     Comment: 2 every couple of months     Drug use: No     Sexual activity: Yes     Partners: Female     Birth control/protection: None   Lifestyle     Physical activity     Days per week: Not on file     Minutes per session: Not on file     Stress: Not on file   Relationships     Social connections     Talks on phone: Not on file     Gets together: Not on file     Attends Oriental orthodox service: Not on file     Active member of club or organization: Not on file     Attends meetings of clubs or organizations: Not on file     Relationship status: Not on file     Intimate partner violence     Fear of current or ex partner: Not on file     Emotionally abused: Not on file     Physically abused: Not on file     Forced sexual activity: Not on file   Other Topics Concern      Service Not Asked     Blood Transfusions Not Asked     Caffeine Concern Yes     Comment: 1 coffee daily     Occupational Exposure Not Asked     Hobby Hazards Not Asked     Sleep Concern Not Asked     Stress Concern Not Asked     Weight Concern Not Asked     Special Diet Not Asked     Back Care Not Asked     Exercise Yes     Comment: walks & Running-3 x days a week     Bike Helmet Not Asked     Seat Belt Yes     Self-Exams Yes     Parent/sibling w/ CABG, MI or angioplasty before 65F 55M? No   Social History Narrative     Not on file       Outpatient Encounter Medications as of 12/18/2020   Medication Sig Dispense Refill     calcium-vitamin D (CALCIUM 600 + D) 600-400 MG-UNIT per tablet Take 1 tablet by  mouth 2 times daily. 100 tablet 3     fluorouracil (EFUDEX) 5 % external cream Apply twice daily for 4 weeks to arms, neck, chest, forehead and ears. (dispense 80 g) 80 g 3     Multiple Vitamins-Minerals (MULTIVITAMIN OR)        ORDER FOR DME Superfeet inserts 2 Device 0     sildenafil (REVATIO) 20 MG tablet 30 min to 4 hrs before sex. Do not use with nitroglycerin, terazosin or doxazosin. 30 tablet 11     simvastatin (ZOCOR) 20 MG tablet Take 1 tablet (20 mg) by mouth At Bedtime 90 tablet 3     [DISCONTINUED] SYNTHROID 112 MCG tablet Take 1 tablet (to make 162 mcg) 2 days a week alternating with taking 175 mcg 5 days per week. BRAND NAME- Do NOT SUBSTITUTE 24 tablet 3     [DISCONTINUED] SYNTHROID 175 MCG tablet TAKE 1 TABLET BY MOUTH 5  DAYS A WEEK ALTERNATING  WITH TAKING 162 MCG 2 DAYS  PER WEEK 60 tablet 3     [DISCONTINUED] SYNTHROID 50 MCG tablet Take 1 tablet (to make 162 mcg) 2 days a week alternating with taking 175 mcg 5 days per week. BRAND NAME- Do NOT SUBSTITUTE 24 tablet 3     loratadine (CLARITIN) 10 MG tablet Take 1 tablet (10 mg) by mouth daily (Patient not taking: Reported on 12/18/2020) 90 tablet 3     [DISCONTINUED] fluorouracil (EFUDEX) 5 % external cream DENIED- PT STATES DID NOT REQUEST (Patient not taking: Reported on 12/18/2020) 0.0001 g 0     [DISCONTINUED] sildenafil (REVATIO) 20 MG tablet Take 1 tablet (20 mg) by mouth daily as needed 30 min to 4 hrs before sex. Do not use with nitroglycerin, terazosin or doxazosin. (Patient not taking: Reported on 12/18/2020) 30 tablet 6     [DISCONTINUED] simvastatin (ZOCOR) 20 MG tablet Take 1 tablet (20 mg) by mouth At Bedtime 90 tablet 3     No facility-administered encounter medications on file as of 12/18/2020.              Review Of Systems  Skin: As above  Eyes: negative  Ears/Nose/Throat: negative  Respiratory: No shortness of breath, dyspnea on exertion, cough      O:   NAD, WDWN, Alert & Oriented, Mood & Affect wnl, Vitals stable   Here today  alone   /88   Pulse 77   SpO2 98%    General appearance normal   Vitals stable   Alert, oriented and in no acute distress     Stuck on papules and brown macules on trunk and ext   Rare gritty papules on arms   Sun damage on neck, chest, face     Eyes: Conjunctivae/lids:Normal     ENT: Lips: normal    MSK:Normal    Pulm: Breathing Normal    Neuro/Psych: Orientation:Alert and Orientedx3 ; Mood/Affect:normal   A/P:  1. Actinic keratoses on face, neck, and chest   Continue efudex for another 2 weeks on arms. Then can treat, chest, neck, ears, forehead with it.   2. Seborrheic keratosis, lentigo, History of SCC  BENIGN LESIONS DISCUSSED WITH PATIENT:  I discussed the specifics of tumor, prognosis, and genetics of benign lesions.  I explained that treatment of these lesions would be purely cosmetic and not medically neccessary.  I discussed with patient different removal options including excision, cautery and /or laser.      Nature and genetics of benign skin lesions dicussed with patient.  Signs and Symptoms of skin cancer discussed with patient.  ABCDEs of melanoma reviewed with patient.  Patient encouraged to perform monthly skin exams.  UV precautions reviewed with patient.  Risks of non-melanoma skin cancer discussed with patient   Return to clinic in 6 months.       Again, thank you for allowing me to participate in the care of your patient.        Sincerely,        Stefani Clifton PA-C

## 2021-03-12 ENCOUNTER — ALLIED HEALTH/NURSE VISIT (OUTPATIENT)
Dept: FAMILY MEDICINE | Facility: CLINIC | Age: 56
End: 2021-03-12
Payer: COMMERCIAL

## 2021-03-12 DIAGNOSIS — Z23 NEED FOR VACCINATION: Primary | ICD-10-CM

## 2021-03-12 PROCEDURE — 90750 HZV VACC RECOMBINANT IM: CPT

## 2021-03-12 PROCEDURE — 90471 IMMUNIZATION ADMIN: CPT

## 2021-03-12 PROCEDURE — 99207 PR NO CHARGE NURSE ONLY: CPT

## 2021-03-12 NOTE — NURSING NOTE
Prior to immunization administration, verified patients identity using patient s name and date of birth. Please see Immunization Activity for additional information.     Screening Questionnaire for Adult Immunization    Are you sick today?   No   Do you have allergies to medications, food, a vaccine component or latex?   No   Have you ever had a serious reaction after receiving a vaccination?   No   Do you have a long-term health problem with heart, lung, kidney, or metabolic disease (e.g., diabetes), asthma, a blood disorder, no spleen, complement component deficiency, a cochlear implant, or a spinal fluid leak?  Are you on long-term aspirin therapy?   No   Do you have cancer, leukemia, HIV/AIDS, or any other immune system problem?   No   Do you have a parent, brother, or sister with an immune system problem?   No   In the past 3 months, have you taken medications that affect  your immune system, such as prednisone, other steroids, or anticancer drugs; drugs for the treatment of rheumatoid arthritis, Crohn s disease, or psoriasis; or have you had radiation treatments?   No   Have you had a seizure, or a brain or other nervous system problem?   No   During the past year, have you received a transfusion of blood or blood    products, or been given immune (gamma) globulin or antiviral drug?   No   For women: Are you pregnant or is there a chance you could become       pregnant during the next month?   No   Have you received any vaccinations in the past 4 weeks?   No     Immunization questionnaire answers were all negative.        Per orders of Dr. Walsh, injection of shingrix given by Phyllis Cha CMA. Patient instructed to remain in clinic for 15 minutes afterwards, and to report any adverse reaction to me immediately.       Screening performed by Phyllis Cha CMA on 3/12/2021 at 2:48 PM.

## 2021-03-29 DIAGNOSIS — L57.0 ACTINIC KERATOSIS: ICD-10-CM

## 2021-03-29 RX ORDER — FLUOROURACIL 50 MG/G
CREAM TOPICAL
Qty: 80 G | Refills: 3 | Status: CANCELLED | OUTPATIENT
Start: 2021-03-29

## 2021-03-29 RX ORDER — FLUOROURACIL 50 MG/G
CREAM TOPICAL
Qty: 80 G | Refills: 0 | Status: SHIPPED | OUTPATIENT
Start: 2021-03-29 | End: 2021-11-20

## 2021-03-29 NOTE — TELEPHONE ENCOUNTER
Requested Prescriptions   Pending Prescriptions Disp Refills     fluorouracil (EFUDEX) 5 % external cream 80 g 3     Sig: Apply twice daily for 4 weeks to arms, neck, chest, forehead and ears. (dispense 80 g)       There is no refill protocol information for this order      Last Written Prescription Date:    Last Fill Quantity: ,  # refills:    Last office visit: 12/18/2020 with prescribing provider:     Future Office Visit:

## 2021-03-29 NOTE — TELEPHONE ENCOUNTER
Per 12-18-20 Derm dictation:     1. Actinic keratoses on face, neck, and chest   Continue efudex for another 2 weeks on arms. Then can treat, chest, neck, ears, forehead with it.     80 g with 3 refills were ordered on 12-18-20 and sent to [facility] pharmacy.     I spoke to pharmacy and patient called in an old rx number, they have this order and will get it ready for the patient. Gisell Baker RN

## 2021-03-29 NOTE — TELEPHONE ENCOUNTER
[facility] pharmacist called back to state that patient has used all refills from the 12-18-20 prescription.     I did verify he received 80 g each time and pharmacist stated 80 g was dispensed each time.     Ok to refill one more time?... Looks like he was to treat many areas.    Gisell Baker RN

## 2021-04-20 ENCOUNTER — IMMUNIZATION (OUTPATIENT)
Dept: FAMILY MEDICINE | Facility: CLINIC | Age: 56
End: 2021-04-20
Payer: COMMERCIAL

## 2021-04-20 PROCEDURE — 91301 PR COVID VAC MODERNA 100 MCG/0.5 ML IM: CPT

## 2021-04-20 PROCEDURE — 0011A PR COVID VAC MODERNA 100 MCG/0.5 ML IM: CPT

## 2021-04-21 ENCOUNTER — MYC MEDICAL ADVICE (OUTPATIENT)
Dept: FAMILY MEDICINE | Facility: CLINIC | Age: 56
End: 2021-04-21

## 2021-04-26 ENCOUNTER — OFFICE VISIT (OUTPATIENT)
Dept: FAMILY MEDICINE | Facility: CLINIC | Age: 56
End: 2021-04-26
Payer: COMMERCIAL

## 2021-04-26 VITALS
WEIGHT: 172.8 LBS | HEART RATE: 81 BPM | SYSTOLIC BLOOD PRESSURE: 138 MMHG | TEMPERATURE: 97.7 F | DIASTOLIC BLOOD PRESSURE: 92 MMHG | HEIGHT: 72 IN | BODY MASS INDEX: 23.4 KG/M2 | OXYGEN SATURATION: 98 %

## 2021-04-26 DIAGNOSIS — M79.645 PAIN OF FINGER OF LEFT HAND: Primary | ICD-10-CM

## 2021-04-26 PROCEDURE — 99213 OFFICE O/P EST LOW 20 MIN: CPT | Performed by: NURSE PRACTITIONER

## 2021-04-26 ASSESSMENT — MIFFLIN-ST. JEOR: SCORE: 1647.85

## 2021-04-26 NOTE — PROGRESS NOTES
"    Assessment & Plan     Pain of finger of left hand  Patient feels like he has lost strength in his 1st 2 fingers- no known injury. ? Carpal tunnel  - Orthopedic & Spine  Referral; Future        No follow-ups on file.    MATTHEW Landry CNP  [facility]    Talia Robertson is a 56 year old who presents for the following health issues     HPI     Musculoskeletal problem  Onset/Duration: 2 weeks   Description  Location: Hand/Fingers - left first 2 fingers feel week with grasp  Joint Swelling: no  Redness: no  Pain: no  Warmth: no  \"loss of power in fingers of left hand\"  Loss of   Intensity:  mild  Progression of Symptoms:  improving  Accompanying signs and symptoms:   Fevers: no  Numbness/tingling/weakness: no  History  Trauma to the area: no  Recent illness:  no  Previous similar problem: no  Previous evaluation:  no  Precipitating or alleviating factors:  Aggravating factors include: none  Therapies tried and outcome: nothing      Review of Systems   Constitutional, HEENT, cardiovascular, pulmonary, GI, , musculoskeletal, neuro, skin, endocrine and psych systems are negative, except as otherwise noted.      Objective    There were no vitals taken for this visit.  There is no height or weight on file to calculate BMI.  Physical Exam   GENERAL APPEARANCE: healthy, alert and no distress  ORTHO: Hand/Finger Exam: Inspection:All Normal  Tender: All Normal  Non-tender: All Normal  Strength: All normal                        "

## 2021-04-26 NOTE — PATIENT INSTRUCTIONS
Schedule appointment with Dr. Vazquez            Thank you for choosing [facility].  You may be receiving an email and/or telephone survey request from [facility] Customer Experience regarding your visit today.  Please take a few minutes to respond to the survey to let us know how we are doing.      If you have questions or concerns, please contact us via [facility] or you can contact your care team at [phone].    Our Clinic hours are:  Monday 6:40 am  to 7:00 pm  Tuesday -Friday 6:40 am to 5:00 pm    The [place] outpatient lab hours are:  Monday - Friday 6:10 am to 4:45 pm  Saturdays 7:00 am to 11:00 am  Appointments are required, call [phone]    If you have clinical questions after hours or would like to schedule an appointment,  call the clinic at [phone].

## 2021-05-18 ENCOUNTER — IMMUNIZATION (OUTPATIENT)
Dept: FAMILY MEDICINE | Facility: CLINIC | Age: 56
End: 2021-05-18
Attending: FAMILY MEDICINE
Payer: COMMERCIAL

## 2021-05-18 PROCEDURE — 91301 PR COVID VAC MODERNA 100 MCG/0.5 ML IM: CPT

## 2021-05-18 PROCEDURE — 0012A PR COVID VAC MODERNA 100 MCG/0.5 ML IM: CPT

## 2021-05-19 ENCOUNTER — ANCILLARY PROCEDURE (OUTPATIENT)
Dept: GENERAL RADIOLOGY | Facility: CLINIC | Age: 56
End: 2021-05-19
Attending: PEDIATRICS
Payer: COMMERCIAL

## 2021-05-19 ENCOUNTER — OFFICE VISIT (OUTPATIENT)
Dept: ORTHOPEDICS | Facility: CLINIC | Age: 56
End: 2021-05-19
Payer: COMMERCIAL

## 2021-05-19 VITALS
SYSTOLIC BLOOD PRESSURE: 133 MMHG | HEIGHT: 71 IN | BODY MASS INDEX: 24.08 KG/M2 | WEIGHT: 172 LBS | DIASTOLIC BLOOD PRESSURE: 80 MMHG

## 2021-05-19 DIAGNOSIS — M79.645 PAIN OF FINGER OF LEFT HAND: ICD-10-CM

## 2021-05-19 DIAGNOSIS — R29.898 LEFT HAND WEAKNESS: Primary | ICD-10-CM

## 2021-05-19 PROCEDURE — 99204 OFFICE O/P NEW MOD 45 MIN: CPT | Performed by: PEDIATRICS

## 2021-05-19 PROCEDURE — 73130 X-RAY EXAM OF HAND: CPT | Mod: LT | Performed by: RADIOLOGY

## 2021-05-19 ASSESSMENT — MIFFLIN-ST. JEOR: SCORE: 1632.32

## 2021-05-19 NOTE — LETTER
5/19/2021         RE: Marcelo Hawkins  [id number] [address] [place] [id number]        Dear Colleague,    Thank you for referring your patient, Marcelo Hawkins, to the [facility] [place]. Please see a copy of my visit note below.    ASSESSMENT & PLAN    Marcelo was seen today for pain.    Diagnoses and all orders for this visit:    Left hand weakness  -     Orthopedic & Spine  Referral; Future    Pain of finger of left hand  -     Orthopedic & Spine  Referral  -     XR Hand Left G/E 3 Views  -     Orthopedic & Spine  Referral; Future      This issue is acute and Unchanged.    We discussed these other possible diagnosis: ulnar neuropathy  - Concern for ulnar neuropathy given weakness, will obtain EMG to evaluate. Discussed potential surgical referral pending results.    Plan:  - Today's Plan of Care:  Referral to PM&R for EMG test of left arm    -We also discussed other future treatment options:  Referral to Hand Surgery  Referral to Occupational Therapy    Follow Up: In clinic with Dr. Eden after EMG (wait at least 1-2 days)    If you have any further questions for your physician or physician s care team you can call [phone] and use option 3 to leave a voice message. Calls received during business hours will be returned same day.    Concerning signs and symptoms were reviewed.  The patient expressed understanding of this management plan and all questions were answered at this time.    Thanks for the opportunity to participate in the care of this patient, I will keep you updated on their progress.    CC: Maya RODRIGESNCIRA Eden MD [facility]  Sports Medicine Physician  [facility]    -----  Chief Complaint   Patient presents with     Left Hand - Pain       SUBJECTIVE  Marcelo Hawkins is a/an 56 year old male who is seen in consultation at the request of  Maya RODRIGESNCIRA for evaluation of left handed weakness,  "primarily 1st and 2nd digit.  Patient's main complaint is he feels like he \"lost power.\"  He is having trouble with gripping and using his hand with his \"pinchers,\"  Examples would be; grabbing a can, tying his shoes   The patient is seen by themselves.  The patient is Right handed    Onset: 3 week(s) ago. Reports insidious onset without acute precipitating event. Did have forearm pain prior to noticing weakness.  Location of Pain: left sided finger weakness of 1st and 2nd   Worsened by: worse  Better with: nothing  Treatments tried: no treatment tried to date  Associated symptoms: weakness of 1st and 2nd digit    Orthopedic/Surgical history: NO  Social History/Occupation: IT computer work    No family history pertinent to patient's problem today.    REVIEW OF SYSTEMS:  Review of Systems  Skin: no bruising, no swelling  Musculoskeletal: as above  Neurologic: no numbness, paresthesias  Remainder of review of systems is negative including constitutional, CV, pulmonary, GI, except as noted in HPI or medical history.    OBJECTIVE:  /80   Ht 1.803 m (5' 11\")   Wt 78 kg (172 lb)   BMI 23.99 kg/m     General: healthy, alert and in no distress  HEENT: no scleral icterus or conjunctival erythema  Skin: no suspicious lesions or rash. No jaundice.  CV: distal perfusion intact  Resp: normal respiratory effort without conversational dyspnea   Psych: normal mood and affect  Gait: normal steady gait with appropriate coordination and balance  Neuro: Normal light sensory exam of upper extremity    Bilateral Wrist and Hand exam  Inspection:       No swelling, bruising or deformity bilateral    Tender:       none    Non Tender:       Remainder of the Wrist and Hand bilateral    ROM:       Full and symmetric active and passive range of motion of the forearm, wrist and digits bilateral    Strength:       5/5 strength in the muscles of the hand, wrist and forearm bilateral with the exception of decreased thumb/index finger " pincer  strength and 4+/5 finger abduction on the left    Special Tests:        neg (-) Tinel's test left at wrist and elbow and       neg (-) Phalen's test left    Neurovascular:       2+ radial pulses bilaterally with brisk capillary refill and      normal sensation to light touch in the radial, median and ulnar nerve distributions    RADIOLOGY:  I independently ordered, visualized and reviewed these images with the patient  3 XR views of left hand reviewed: no acute bony abnormality, no significant degenerative change  - will follow official read    Review of the result(s) of each unique test - XR           Again, thank you for allowing me to participate in the care of your patient.        Sincerely,        Maribell Eden MD

## 2021-05-19 NOTE — PATIENT INSTRUCTIONS
We discussed these other possible diagnosis: ulnar neuropathy    Plan:  - Today's Plan of Care:  Referral to PM&R for EMG test of left arm    -We also discussed other future treatment options:  Referral to Hand Surgery  Referral to Occupational Therapy    Follow Up: In clinic with Dr. Eden after EMG (wait at least 1-2 days)    If you have any further questions for your physician or physician s care team you can call [phone] and use option 3 to leave a voice message. Calls received during business hours will be returned same day.

## 2021-05-19 NOTE — RESULT ENCOUNTER NOTE
These results were discussed during office visit.    Maribell Eden MD, CAQ  Primary Care Sports Medicine  [facility]

## 2021-05-19 NOTE — PROGRESS NOTES
"ASSESSMENT & PLAN    Marcelo was seen today for pain.    Diagnoses and all orders for this visit:    Left hand weakness  -     Orthopedic & Spine  Referral; Future    Pain of finger of left hand  -     Orthopedic & Spine  Referral  -     XR Hand Left G/E 3 Views  -     Orthopedic & Spine  Referral; Future      This issue is acute and Unchanged.    We discussed these other possible diagnosis: ulnar neuropathy  - Concern for ulnar neuropathy given weakness, will obtain EMG to evaluate. Discussed potential surgical referral pending results.    Plan:  - Today's Plan of Care:  Referral to PM&R for EMG test of left arm    -We also discussed other future treatment options:  Referral to Hand Surgery  Referral to Occupational Therapy    Follow Up: In clinic with Dr. Eden after EMG (wait at least 1-2 days)    If you have any further questions for your physician or physician s care team you can call [phone] and use option 3 to leave a voice message. Calls received during business hours will be returned same day.    Concerning signs and symptoms were reviewed.  The patient expressed understanding of this management plan and all questions were answered at this time.    Thanks for the opportunity to participate in the care of this patient, I will keep you updated on their progress.    CC: Maya Eden MD [facility]  Sports Medicine Physician  [facility]    -----  Chief Complaint   Patient presents with     Left Hand - Pain       SUBJECTIVE  Marcelo Hawkins is a/an 56 year old male who is seen in consultation at the request of  Maya Lema C.N.P. for evaluation of left handed weakness, primarily 1st and 2nd digit.  Patient's main complaint is he feels like he \"lost power.\"  He is having trouble with gripping and using his hand with his \"pinchers,\"  Examples would be; grabbing a can, tying his shoes   The patient is seen by themselves.  The patient is " "Right handed    Onset: 3 week(s) ago. Reports insidious onset without acute precipitating event. Did have forearm pain prior to noticing weakness.  Location of Pain: left sided finger weakness of 1st and 2nd   Worsened by: worse  Better with: nothing  Treatments tried: no treatment tried to date  Associated symptoms: weakness of 1st and 2nd digit    Orthopedic/Surgical history: NO  Social History/Occupation: IT computer work    No family history pertinent to patient's problem today.    REVIEW OF SYSTEMS:  Review of Systems  Skin: no bruising, no swelling  Musculoskeletal: as above  Neurologic: no numbness, paresthesias  Remainder of review of systems is negative including constitutional, CV, pulmonary, GI, except as noted in HPI or medical history.    OBJECTIVE:  /80   Ht 1.803 m (5' 11\")   Wt 78 kg (172 lb)   BMI 23.99 kg/m     General: healthy, alert and in no distress  HEENT: no scleral icterus or conjunctival erythema  Skin: no suspicious lesions or rash. No jaundice.  CV: distal perfusion intact  Resp: normal respiratory effort without conversational dyspnea   Psych: normal mood and affect  Gait: normal steady gait with appropriate coordination and balance  Neuro: Normal light sensory exam of upper extremity    Bilateral Wrist and Hand exam  Inspection:       No swelling, bruising or deformity bilateral    Tender:       none    Non Tender:       Remainder of the Wrist and Hand bilateral    ROM:       Full and symmetric active and passive range of motion of the forearm, wrist and digits bilateral    Strength:       5/5 strength in the muscles of the hand, wrist and forearm bilateral with the exception of decreased thumb/index finger pincer  strength and 4+/5 finger abduction on the left    Special Tests:        neg (-) Tinel's test left at wrist and elbow and       neg (-) Phalen's test left    Neurovascular:       2+ radial pulses bilaterally with brisk capillary refill and      normal sensation to " light touch in the radial, median and ulnar nerve distributions    RADIOLOGY:  I independently ordered, visualized and reviewed these images with the patient  3 XR views of left hand reviewed: no acute bony abnormality, no significant degenerative change  - will follow official read    Review of the result(s) of each unique test - XR

## 2021-06-01 ENCOUNTER — TRANSFERRED RECORDS (OUTPATIENT)
Dept: HEALTH INFORMATION MANAGEMENT | Facility: CLINIC | Age: 56
End: 2021-06-01

## 2021-06-07 ENCOUNTER — TELEPHONE (OUTPATIENT)
Dept: ORTHOPEDICS | Facility: CLINIC | Age: 56
End: 2021-06-07

## 2021-06-07 DIAGNOSIS — M79.645 PAIN OF FINGER OF LEFT HAND: Primary | ICD-10-CM

## 2021-06-07 DIAGNOSIS — R29.898 LEFT HAND WEAKNESS: ICD-10-CM

## 2021-06-07 NOTE — TELEPHONE ENCOUNTER
Returned patient's call.  Discussed results and treatment with hand surgeon.  He will await call from ortho [facility].   Rani Mcnulty MS ATC

## 2021-06-07 NOTE — TELEPHONE ENCOUNTER
LVM for patient to call back, or send MyChart if he would like to communicate that way.    Rani Mcnulty MS ATC

## 2021-06-07 NOTE — TELEPHONE ENCOUNTER
Left a VM requesting a call back.  Asked patient to leave 2-3 times they are available over the next 2-3 days so I can give them a call back.    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  Given this finding, I'm recommend referral to hand surgery (3-5 days, likely [facility]).  Please place referral.    Maribell Eden MD

## 2021-06-07 NOTE — TELEPHONE ENCOUNTER
Patient LVM returning call. Had his phone in his pocket and did not hear it ring. Is available anytime.

## 2021-06-21 ENCOUNTER — TRANSFERRED RECORDS (OUTPATIENT)
Dept: HEALTH INFORMATION MANAGEMENT | Facility: CLINIC | Age: 56
End: 2021-06-21

## 2021-07-12 NOTE — PROGRESS NOTES
[facility]  [address] [address]  Phone: [phone]  Primary Provider: Maya Lema        PREOPERATIVE EVALUATION:  Today's date: 7/13/2021    Marcelo Hawkins is a 56 year old male who presents for a preoperative evaluation.    Surgical Information:  Surgery/Procedure: Anterior interosseous nerve, left forearm  Surgery Location: [facility]  Surgeon: Dr. Kessler  Surgery Date: 8/10  Time of Surgery: To be determind  Where patient plans to recover: At home with family  Fax number for surgical facility: [phone]    Type of Anesthesia Anticipated: to be determined    Assessment & Plan     The proposed surgical procedure is considered INTERMEDIATE risk.    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 BRAND NAME- Do NOT SUBSTITUTE  - SYNTHROID 175 MCG tablet; TAKE 1 TABLET BY MOUTH 5  DAYS A WEEK ALTERNATING  WITH TAKING 162 MCG 2 DAYS  PER WEEK  - SYNTHROID 112 MCG tablet; Take 1 tablet (to make 162 mcg) 2 days a week alternating with taking 175 mcg 5 days per week. BRAND NAME- Do NOT SUBSTITUTE    Papillary carcinoma of thyroid (H)    - SYNTHROID 50 MCG tablet; Take 1 tablet (to make 162 mcg) 2 days a week alternating with taking 175 mcg 5 days per week. BRAND NAME- Do NOT SUBSTITUTE  - SYNTHROID 175 MCG tablet; TAKE 1 TABLET BY MOUTH 5  DAYS A WEEK ALTERNATING  WITH TAKING 162 MCG 2 DAYS  PER WEEK  - SYNTHROID 112 MCG tablet; Take 1 tablet (to make 162 mcg) 2 days a week alternating with taking 175 mcg 5 days per week. BRAND NAME- Do NOT SUBSTITUTE    Hyperlipidemia LDL goal <130  Tolerating statin  - Refilled simvastatin (ZOCOR) 20 MG tablet; Take 1 tablet (20 mg) by mouth At Bedtime         Risks and  Recommendations:  The patient has the following additional risks and recommendations for perioperative complications:   - No identified additional risk factors other than previously addressed    Medication Instructions:  Patient is to take all scheduled medications on the day of surgery    RECOMMENDATION:  APPROVAL GIVEN to proceed with proposed procedure, without further diagnostic evaluation.            Subjective     HPI related to upcoming procedure: 1st and 2nd digit left finger weakness for 5 months. EMG showed abnormal left Anterior interosseous nerve syndrome       Preop Questions 7/13/2021   1. Have you ever had a heart attack or stroke? No   2. Have you ever had surgery on your heart or blood vessels, such as a stent placement, a coronary artery bypass, or surgery on an artery in your head, neck, heart, or legs? No   3. Do you have chest pain with activity? No   4. Do you have a history of  heart failure? No   5. Do you currently have a cold, bronchitis or symptoms of other infection? No   6. Do you have a cough, shortness of breath, or wheezing? No   7. Do you or anyone in your family have previous history of blood clots? No   8. Do you or does anyone in your family have a serious bleeding problem such as prolonged bleeding following surgeries or cuts? No   9. Have you ever had problems with anemia or been told to take iron pills? No   10. Have you had any abnormal blood loss such as black, tarry or bloody stools? No   11. Have you ever had a blood transfusion? No   12. Are you willing to have a blood transfusion if it is medically needed before, during, or after your surgery? Yes   13. Have you or any of your relatives ever had problems with anesthesia? No   14. Do you have sleep apnea, excessive snoring or daytime drowsiness? No   15. Do you have any artifical heart valves or other implanted medical devices like a pacemaker, defibrillator, or continuous glucose monitor? No   16. Do you have artificial  joints? No   17. Are you allergic to latex? No     Health Care Directive:  Patient does not have a Health Care Directive or Living Will: Patient states has Advance Directive and will bring in a copy to clinic.    Preoperative Review of :   reviewed - no record of controlled substances prescribed.      Status of Chronic Conditions:  HYPERLIPIDEMIA - Patient has a long history of significant Hyperlipidemia requiring medication for treatment with recent good control. Patient reports no problems or side effects with the medication.     HYPOTHYROIDISM - Patient has a longstanding history of chronic Hypothyroidism. Patient has been doing well, noting no tremor, insomnia, hair loss or changes in skin texture. Continues to take medications as directed, without adverse reactions or side effects. Last TSH   Lab Results   Component Value Date    TSH 0.76 12/18/2020   .        Review of Systems  CONSTITUTIONAL: NEGATIVE for fever, chills, change in weight  INTEGUMENTARY/SKIN: NEGATIVE for worrisome rashes, moles or lesions  EYES: NEGATIVE for vision changes or irritation  ENT/MOUTH: NEGATIVE for ear, mouth and throat problems  RESP: NEGATIVE for significant cough or SOB  BREAST: NEGATIVE for masses, tenderness or discharge  CV: NEGATIVE for chest pain, palpitations or peripheral edema  GI: NEGATIVE for nausea, abdominal pain, heartburn, or change in bowel habits  : NEGATIVE for frequency, dysuria, or hematuria  MUSCULOSKELETAL:left hand 1st and 2nd digit weakness   NEURO: NEGATIVE for weakness, dizziness or paresthesias  ENDOCRINE: NEGATIVE for temperature intolerance, skin/hair changes  HEME: NEGATIVE for bleeding problems  PSYCHIATRIC: NEGATIVE for changes in mood or affect    Patient Active Problem List    Diagnosis Date Noted     Elevated blood pressure reading without diagnosis of hypertension 08/23/2018     Priority: Medium     Postsurgical hypothyroidism 11/12/2017     Priority: Medium     Gastroesophageal reflux  "disease without esophagitis 05/31/2017     Priority: Medium     Anxiety 09/28/2015     Priority: Medium     Internal hemorrhoids 12/04/2013     Priority: Medium     H/O seasonal allergies 12/04/2013     Priority: Medium     Left foot pain 12/04/2013     Priority: Medium     Osteopenia 01/16/2012     Priority: Medium     Per Endocrinologist- [place]-  Males are \"Z\" scores not \"T scores\" to dx osteopenia/osteoporosis.   BMD 9/13/11:  (T scores)  Lumbar -1.8, left fem neck -2.2, right fem neck -2.2  Starting calcium/vitamin D and adjusting thyroid med  Plan to repeat bmd in 2 years       Hyperlipidemia LDL goal <130 10/31/2010     Priority: Medium     CHOL      198   8/16/2011  HDL       55   8/16/2011  LDL      119   8/16/2011  TRIG      120   8/16/2011  CHOLHDLRATIO      4.0   8/16/2011  On simvastatin 20mg       Papillary carcinoma of thyroid (H) 09/23/2005     Priority: Medium     s/p thryoidectomy 1989 for papillary thyroid cancer  Goal TSH 0.7 - 1.4  Needs brand name synthryoid        Past Medical History:   Diagnosis Date     Actinic keratosis      Malignant neoplasm of thyroid gland (H) 1989    papillary-s/p thyroidectomy, residual hypothyroidism     Other and unspecified hyperlipidemia      Postsurgical hypothyroidism     s/p thryoidectomy      Squamous cell carcinoma      Past Surgical History:   Procedure Laterality Date     COLONOSCOPY N/A 3/17/2016    Procedure: COLONOSCOPY;  Surgeon: Jovanny Fuller MD;  Location: [facility]     ESOPHAGOSCOPY, GASTROSCOPY, DUODENOSCOPY (EGD), COMBINED N/A 5/18/2017    Procedure: COMBINED ESOPHAGOSCOPY, GASTROSCOPY, DUODENOSCOPY (EGD), BIOPSY SINGLE OR MULTIPLE;  Gastroscopy  ;  Surgeon: Jovanny Fuller MD;  Location: [facility]     SURGICAL HISTORY OF -       hernia times three-right inguinal     SURGICAL HISTORY OF -   1989    thyroidectomy     Current Outpatient Medications   Medication Sig Dispense Refill     calcium-vitamin D (CALCIUM 600 + D) 600-400 MG-UNIT per tablet Take 1 " "tablet by mouth 2 times daily. 100 tablet 3     fluorouracil (EFUDEX) 5 % external cream Apply twice daily for 4 weeks to arms, neck, chest, forehead and ears. (dispense 80 g) 80 g 0     loratadine (CLARITIN) 10 MG tablet Take 1 tablet (10 mg) by mouth daily (Patient taking differently: Take 10 mg by mouth as needed \"sometimes in the summer\") 90 tablet 3     Multiple Vitamins-Minerals (MULTIVITAMIN OR)        ORDER FOR DME Superfeet inserts 2 Device 0     sildenafil (REVATIO) 20 MG tablet 30 min to 4 hrs before sex. Do not use with nitroglycerin, terazosin or doxazosin. 30 tablet 11     simvastatin (ZOCOR) 20 MG tablet Take 1 tablet (20 mg) by mouth At Bedtime 90 tablet 3     SYNTHROID 112 MCG tablet Take 1 tablet (to make 162 mcg) 2 days a week alternating with taking 175 mcg 5 days per week. BRAND NAME- Do NOT SUBSTITUTE 24 tablet 3     SYNTHROID 175 MCG tablet TAKE 1 TABLET BY MOUTH 5  DAYS A WEEK ALTERNATING  WITH TAKING 162 MCG 2 DAYS  PER WEEK 60 tablet 3     SYNTHROID 50 MCG tablet Take 1 tablet (to make 162 mcg) 2 days a week alternating with taking 175 mcg 5 days per week. BRAND NAME- Do NOT SUBSTITUTE 24 tablet 3       Allergies   Allergen Reactions     Nka [No Known Allergies]         Social History     Tobacco Use     Smoking status: Never Smoker     Smokeless tobacco: Never Used   Substance Use Topics     Alcohol use: Yes     Comment: 2 every couple of months       History   Drug Use No         Objective     There were no vitals taken for this visit.    Physical Exam    GENERAL APPEARANCE: healthy, alert and no distress     EYES: EOMI,  PERRL     HENT: ear canals and TM's normal and nose and mouth without ulcers or lesions     NECK: no adenopathy, no asymmetry, masses, or scars and thyroid normal to palpation     RESP: lungs clear to auscultation - no rales, rhonchi or wheezes     CV: regular rates and rhythm, normal S1 S2, no S3 or S4 and no murmur, click or rub     ABDOMEN:  soft, nontender, no HSM or " masses and bowel sounds normal     MS: extremities normal- no gross deformities noted, no evidence of inflammation in joints, FROM in all extremities.     SKIN: no suspicious lesions or rashes     NEURO: Normal strength and tone, sensory exam grossly normal, mentation intact and speech normal     PSYCH: mentation appears normal. and affect normal/bright     LYMPHATICS: No cervical adenopathy    Recent Labs   Lab Test 12/18/20  0808 07/31/20  1003    136   POTASSIUM 3.9 4.0   CR 0.90 0.96        Diagnostics:  No labs were ordered during this visit.   No EKG required, no history of coronary heart disease, significant arrhythmia, peripheral arterial disease or other structural heart disease.    Revised Cardiac Risk Index (RCRI):  The patient has the following serious cardiovascular risks for perioperative complications:   - No serious cardiac risks = 0 points     RCRI Interpretation: 0 points: Class I (very low risk - 0.4% complication rate)           Signed Electronically by: MATTHEW Landry CNP  Copy of this evaluation report is provided to requesting physician.

## 2021-07-13 ENCOUNTER — OFFICE VISIT (OUTPATIENT)
Dept: FAMILY MEDICINE | Facility: CLINIC | Age: 56
End: 2021-07-13
Payer: COMMERCIAL

## 2021-07-13 VITALS
DIASTOLIC BLOOD PRESSURE: 84 MMHG | OXYGEN SATURATION: 98 % | RESPIRATION RATE: 16 BRPM | HEART RATE: 73 BPM | BODY MASS INDEX: 23.85 KG/M2 | WEIGHT: 171 LBS | TEMPERATURE: 97.1 F | SYSTOLIC BLOOD PRESSURE: 138 MMHG

## 2021-07-13 DIAGNOSIS — G56.12: ICD-10-CM

## 2021-07-13 DIAGNOSIS — E78.5 HYPERLIPIDEMIA LDL GOAL <130: ICD-10-CM

## 2021-07-13 DIAGNOSIS — E89.0 POSTSURGICAL HYPOTHYROIDISM: ICD-10-CM

## 2021-07-13 DIAGNOSIS — Z01.818 PRE-OP EXAMINATION: Primary | ICD-10-CM

## 2021-07-13 DIAGNOSIS — C73 PAPILLARY CARCINOMA OF THYROID (H): ICD-10-CM

## 2021-07-13 PROCEDURE — 99214 OFFICE O/P EST MOD 30 MIN: CPT | Performed by: NURSE PRACTITIONER

## 2021-07-13 RX ORDER — LEVOTHYROXINE SODIUM 50 MCG
TABLET ORAL
Qty: 24 TABLET | Refills: 1 | Status: SHIPPED | OUTPATIENT
Start: 2021-07-13 | End: 2022-01-09

## 2021-07-13 RX ORDER — SIMVASTATIN 20 MG
20 TABLET ORAL AT BEDTIME
Qty: 90 TABLET | Refills: 1 | Status: SHIPPED | OUTPATIENT
Start: 2021-07-13 | End: 2022-01-05

## 2021-07-13 RX ORDER — LEVOTHYROXINE SODIUM 112 MCG
TABLET ORAL
Qty: 24 TABLET | Refills: 1 | Status: SHIPPED | OUTPATIENT
Start: 2021-07-13 | End: 2022-01-19

## 2021-07-13 RX ORDER — LEVOTHYROXINE SODIUM 175 MCG
TABLET ORAL
Qty: 60 TABLET | Refills: 1 | Status: SHIPPED | OUTPATIENT
Start: 2021-07-13 | End: 2022-01-19

## 2021-08-23 ENCOUNTER — TRANSFERRED RECORDS (OUTPATIENT)
Dept: HEALTH INFORMATION MANAGEMENT | Facility: CLINIC | Age: 56
End: 2021-08-23

## 2021-09-25 ENCOUNTER — MYC MEDICAL ADVICE (OUTPATIENT)
Dept: FAMILY MEDICINE | Facility: CLINIC | Age: 56
End: 2021-09-25

## 2021-09-25 ENCOUNTER — HEALTH MAINTENANCE LETTER (OUTPATIENT)
Age: 56
End: 2021-09-25

## 2021-10-01 ENCOUNTER — IMMUNIZATION (OUTPATIENT)
Dept: FAMILY MEDICINE | Facility: CLINIC | Age: 56
End: 2021-10-01
Payer: COMMERCIAL

## 2021-10-01 PROCEDURE — 90471 IMMUNIZATION ADMIN: CPT

## 2021-10-01 PROCEDURE — 90682 RIV4 VACC RECOMBINANT DNA IM: CPT

## 2021-10-25 DIAGNOSIS — N52.8 OTHER MALE ERECTILE DYSFUNCTION: ICD-10-CM

## 2021-10-26 RX ORDER — SILDENAFIL CITRATE 20 MG/1
TABLET ORAL
Qty: 30 TABLET | Refills: 5 | Status: SHIPPED | OUTPATIENT
Start: 2021-10-26 | End: 2022-03-07

## 2021-11-20 ENCOUNTER — MYC REFILL (OUTPATIENT)
Dept: DERMATOLOGY | Facility: CLINIC | Age: 56
End: 2021-11-20
Payer: COMMERCIAL

## 2021-11-20 DIAGNOSIS — L57.0 ACTINIC KERATOSIS: ICD-10-CM

## 2021-11-22 ENCOUNTER — TRANSFERRED RECORDS (OUTPATIENT)
Dept: HEALTH INFORMATION MANAGEMENT | Facility: CLINIC | Age: 56
End: 2021-11-22
Payer: COMMERCIAL

## 2021-11-23 RX ORDER — FLUOROURACIL 50 MG/G
CREAM TOPICAL
Qty: 80 G | Refills: 0 | Status: SHIPPED | OUTPATIENT
Start: 2021-11-23 | End: 2021-12-21

## 2021-12-09 ENCOUNTER — MYC MEDICAL ADVICE (OUTPATIENT)
Dept: FAMILY MEDICINE | Facility: CLINIC | Age: 56
End: 2021-12-09
Payer: COMMERCIAL

## 2021-12-09 DIAGNOSIS — E89.0 POSTSURGICAL HYPOTHYROIDISM: Primary | ICD-10-CM

## 2021-12-09 DIAGNOSIS — E78.5 HYPERLIPIDEMIA LDL GOAL <130: ICD-10-CM

## 2021-12-09 DIAGNOSIS — Z00.00 ANNUAL PHYSICAL EXAM: ICD-10-CM

## 2021-12-13 NOTE — TELEPHONE ENCOUNTER
Good Morning Maya,     Orders pended for you.     Please see my chart message from patient.     Mallika Peñaloza RN on 12/13/2021 at 7:52 AM

## 2021-12-16 ENCOUNTER — IMMUNIZATION (OUTPATIENT)
Dept: FAMILY MEDICINE | Facility: CLINIC | Age: 56
End: 2021-12-16
Payer: COMMERCIAL

## 2021-12-16 PROCEDURE — 91306 COVID-19,PF,MODERNA (18+ YRS BOOSTER .25ML): CPT

## 2021-12-16 PROCEDURE — 0064A COVID-19,PF,MODERNA (18+ YRS BOOSTER .25ML): CPT

## 2021-12-21 DIAGNOSIS — L57.0 ACTINIC KERATOSIS: ICD-10-CM

## 2021-12-21 RX ORDER — FLUOROURACIL 50 MG/G
CREAM TOPICAL
Qty: 80 G | Refills: 0 | Status: SHIPPED | OUTPATIENT
Start: 2021-12-21

## 2021-12-21 NOTE — TELEPHONE ENCOUNTER
Do you want to refill 80 g again?     He has a January appt scheduled, but looks like he is using it on a large area...    Last seen December 2020.    Gisell Baker RN

## 2021-12-21 NOTE — TELEPHONE ENCOUNTER
Requested Prescriptions   Pending Prescriptions Disp Refills     fluorouracil (EFUDEX) 5 % external cream 80 g 0     Sig: Apply twice daily for 4 weeks to arms, neck, chest, forehead and ears. (dispense 80 g)       There is no refill protocol information for this order        Last office visit: 12/18/2020 with prescribing provider:  NAVIN KAYE   Future Office Visit:   Next 5 appointments (look out 90 days)    Jan 20, 2022  7:00 AM  (Arrive by 6:45 AM)  Return Visit with Navin Kaye PA-C  [facility] [address] [address] [phone]               [facility]  Specialty Clinic [facility]

## 2021-12-30 DIAGNOSIS — E78.5 HYPERLIPIDEMIA LDL GOAL <130: ICD-10-CM

## 2021-12-31 NOTE — TELEPHONE ENCOUNTER
Statins Protocol Failed 12/30/2021 09:03 PM   Protocol Details  LDL on file in past 12 months    No abnormal creatine kinase in past 12 months    Recent (12 mo) or future (30 days) visit within the authorizing provider's specialty    Medication is active on med list    Patient is age 18 or older     Routing refill request to provider for review/approval because:  Labs not current:  LDL (future order noted)    Pending Prescriptions:                       Disp   Refills    simvastatin (ZOCOR) 20 MG tablet [Pharmacy*90 tab*3        Sig: TAKE 1 TABLET BY MOUTH AT  BEDTIME    Mallika Peñaloza RN on 12/31/2021 at 9:42 AM

## 2022-01-03 DIAGNOSIS — E89.0 POSTSURGICAL HYPOTHYROIDISM: ICD-10-CM

## 2022-01-03 DIAGNOSIS — C73 PAPILLARY CARCINOMA OF THYROID (H): ICD-10-CM

## 2022-01-05 RX ORDER — SIMVASTATIN 20 MG
TABLET ORAL
Qty: 30 TABLET | Refills: 0 | Status: SHIPPED | OUTPATIENT
Start: 2022-01-05 | End: 2022-02-14

## 2022-01-07 NOTE — TELEPHONE ENCOUNTER
Routing refill request to provider for review/approval because:  Labs not current:  TSH 12/18/20 future lab ordered     Thyroid Protocol Failed 01/03/2022 03:41 PM   Protocol Details  Normal TSH on file in past 12 months    Patient is 12 years or older    Recent (12 mo) or future (30 days) visit within the authorizing provider's specialty    Medication is active on med list        Pending Prescriptions:                       Disp   Refills    SYNTHROID 50 MCG tablet [Pharmacy Med Name*26 tab*3        Sig: TAKE 1 TABLET BY MOUTH WITH 112 MCG TABLET 2 DAYS            WEEKLY ALTERNATING WITH 175 MCG TABLET 5 DAYS PER           WEEK    Mallika Peñaloza RN on 1/7/2022 at 1:32 PM

## 2022-01-09 RX ORDER — LEVOTHYROXINE SODIUM 50 MCG
TABLET ORAL
Qty: 26 TABLET | Refills: 0 | Status: SHIPPED | OUTPATIENT
Start: 2022-01-09 | End: 2022-04-08

## 2022-01-17 ENCOUNTER — LAB (OUTPATIENT)
Dept: LAB | Facility: CLINIC | Age: 57
End: 2022-01-17
Payer: COMMERCIAL

## 2022-01-17 DIAGNOSIS — E78.5 HYPERLIPIDEMIA LDL GOAL <130: ICD-10-CM

## 2022-01-17 DIAGNOSIS — Z00.00 ANNUAL PHYSICAL EXAM: ICD-10-CM

## 2022-01-17 DIAGNOSIS — E89.0 POSTSURGICAL HYPOTHYROIDISM: ICD-10-CM

## 2022-01-17 LAB
ANION GAP SERPL CALCULATED.3IONS-SCNC: 4 MMOL/L (ref 3–14)
BUN SERPL-MCNC: 14 MG/DL (ref 7–30)
CALCIUM SERPL-MCNC: 8.9 MG/DL (ref 8.5–10.1)
CHLORIDE BLD-SCNC: 105 MMOL/L (ref 94–109)
CHOLEST SERPL-MCNC: 162 MG/DL
CO2 SERPL-SCNC: 29 MMOL/L (ref 20–32)
CREAT SERPL-MCNC: 0.88 MG/DL (ref 0.66–1.25)
FASTING STATUS PATIENT QL REPORTED: YES
GFR SERPL CREATININE-BSD FRML MDRD: >90 ML/MIN/1.73M2
GLUCOSE BLD-MCNC: 94 MG/DL (ref 70–99)
HDLC SERPL-MCNC: 59 MG/DL
LDLC SERPL CALC-MCNC: 91 MG/DL
NONHDLC SERPL-MCNC: 103 MG/DL
POTASSIUM BLD-SCNC: 4.3 MMOL/L (ref 3.4–5.3)
SODIUM SERPL-SCNC: 138 MMOL/L (ref 133–144)
TRIGL SERPL-MCNC: 58 MG/DL
TSH SERPL DL<=0.005 MIU/L-ACNC: 0.5 MU/L (ref 0.4–4)

## 2022-01-17 PROCEDURE — 36415 COLL VENOUS BLD VENIPUNCTURE: CPT

## 2022-01-17 PROCEDURE — 80061 LIPID PANEL: CPT

## 2022-01-17 PROCEDURE — 99000 SPECIMEN HANDLING OFFICE-LAB: CPT

## 2022-01-17 PROCEDURE — 80048 BASIC METABOLIC PNL TOTAL CA: CPT

## 2022-01-17 PROCEDURE — 84154 ASSAY OF PSA FREE: CPT | Mod: 90

## 2022-01-17 PROCEDURE — 84153 ASSAY OF PSA TOTAL: CPT | Mod: 90

## 2022-01-17 PROCEDURE — 84443 ASSAY THYROID STIM HORMONE: CPT

## 2022-01-18 LAB
PSA FREE MFR SERPL: 21 %
PSA FREE SERPL-MCNC: 0.6 NG/ML
PSA SERPL IA-MCNC: 2.9 NG/ML

## 2022-01-19 DIAGNOSIS — C73 PAPILLARY CARCINOMA OF THYROID (H): ICD-10-CM

## 2022-01-19 DIAGNOSIS — E89.0 POSTSURGICAL HYPOTHYROIDISM: ICD-10-CM

## 2022-01-19 RX ORDER — LEVOTHYROXINE SODIUM 112 MCG
TABLET ORAL
Qty: 24 TABLET | Refills: 1 | Status: SHIPPED | OUTPATIENT
Start: 2022-01-19 | End: 2022-04-08

## 2022-01-19 RX ORDER — LEVOTHYROXINE SODIUM 175 MCG
TABLET ORAL
Qty: 60 TABLET | Refills: 1 | Status: SHIPPED | OUTPATIENT
Start: 2022-01-19 | End: 2022-04-08

## 2022-01-19 NOTE — TELEPHONE ENCOUNTER
Reason for Call:  Medication or medication refill:    Do you use a [facility]?  Name of the pharmacy and phone number for the current request:  [facility]    Name of the medication requested: Synthroid 112, 175    Other request: none    Can we leave a detailed message on this number? YES    Phone number patient can be reached at: Home number on file [phone] (home)    Best Time: any    Call taken on 1/19/2022 at 11:58 AM by Mary Porras

## 2022-01-19 NOTE — TELEPHONE ENCOUNTER
"Requested Prescriptions   Pending Prescriptions Disp Refills     SYNTHROID 175 MCG tablet 60 tablet 1     Sig: TAKE 1 TABLET BY MOUTH 5  DAYS A WEEK ALTERNATING  WITH TAKING 162 MCG 2 DAYS  PER WEEK       Thyroid Protocol Passed - 1/19/2022  3:21 PM        Passed - Patient is 12 years or older        Passed - Recent (12 mo) or future (30 days) visit within the authorizing provider's specialty     Patient has had an office visit with the authorizing provider or a provider within the authorizing providers department within the previous 12 mos or has a future within next 30 days. See \"Patient Info\" tab in inbasket, or \"Choose Columns\" in Meds & Orders section of the refill encounter.              Passed - Medication is active on med list        Passed - Normal TSH on file in past 12 months     Recent Labs   Lab Test 01/17/22  0717   TSH 0.50                 SYNTHROID 112 MCG tablet 24 tablet 1     Sig: Take 1 tablet (to make 162 mcg) 2 days a week alternating with taking 175 mcg 5 days per week. BRAND NAME- Do NOT SUBSTITUTE       Thyroid Protocol Passed - 1/19/2022  3:21 PM        Passed - Patient is 12 years or older        Passed - Recent (12 mo) or future (30 days) visit within the authorizing provider's specialty     Patient has had an office visit with the authorizing provider or a provider within the authorizing providers department within the previous 12 mos or has a future within next 30 days. See \"Patient Info\" tab in inbasket, or \"Choose Columns\" in Meds & Orders section of the refill encounter.              Passed - Medication is active on med list        Passed - Normal TSH on file in past 12 months     Recent Labs   Lab Test 01/17/22  0717   TSH 0.50                   "

## 2022-01-20 ENCOUNTER — OFFICE VISIT (OUTPATIENT)
Dept: DERMATOLOGY | Facility: CLINIC | Age: 57
End: 2022-01-20
Payer: COMMERCIAL

## 2022-01-20 VITALS — SYSTOLIC BLOOD PRESSURE: 122 MMHG | HEART RATE: 80 BPM | DIASTOLIC BLOOD PRESSURE: 82 MMHG | OXYGEN SATURATION: 96 %

## 2022-01-20 DIAGNOSIS — L81.4 LENTIGO: Primary | ICD-10-CM

## 2022-01-20 DIAGNOSIS — L57.0 ACTINIC KERATOSIS: ICD-10-CM

## 2022-01-20 DIAGNOSIS — Z86.007 HISTORY OF SQUAMOUS CELL CARCINOMA IN SITU: ICD-10-CM

## 2022-01-20 DIAGNOSIS — L82.1 SEBORRHEIC KERATOSIS: ICD-10-CM

## 2022-01-20 PROCEDURE — 99213 OFFICE O/P EST LOW 20 MIN: CPT | Performed by: PHYSICIAN ASSISTANT

## 2022-01-20 NOTE — NURSING NOTE
Chief Complaint   Patient presents with     Skin Check     no concerns       Vitals:    01/20/22 0705   BP: 122/82   BP Location: Right arm   Patient Position: Sitting   Cuff Size: Adult Regular   Pulse: 80   SpO2: 96%     Wt Readings from Last 1 Encounters:   07/13/21 77.6 kg (171 lb)       Iraida Craven LPN .................1/20/2022

## 2022-01-20 NOTE — PROGRESS NOTES
Marcelo Hawkins is an extremely pleasant 56 year old year old male patient here today for skin check. He notes he has been currently treated for actinic keratoses on both arms, neck, and chest. He notes areas are getting irritated as expected. No painful or bleeding skin lesions. Patient has no other skin complaints today.  Remainder of the HPI, Meds, PMH, Allergies, FH, and SH was reviewed in chart.    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-s/p thyroidectomy, residual hypothyroidism     Other and unspecified hyperlipidemia      Postsurgical hypothyroidism     s/p thryoidectomy      Squamous cell carcinoma        Past Surgical History:   Procedure Laterality Date     COLONOSCOPY N/A 3/17/2016    Procedure: COLONOSCOPY;  Surgeon: Jovanny Fuller MD;  Location: [facility]     ESOPHAGOSCOPY, GASTROSCOPY, DUODENOSCOPY (EGD), COMBINED N/A 5/18/2017    Procedure: COMBINED ESOPHAGOSCOPY, GASTROSCOPY, DUODENOSCOPY (EGD), BIOPSY SINGLE OR MULTIPLE;  Gastroscopy  ;  Surgeon: Jovanny Fuller MD;  Location: [facility]     SURGICAL HISTORY OF -       hernia times three-right inguinal     SURGICAL HISTORY OF -   1989    thyroidectomy        Family History   Problem Relation Age of Onset     Lipids Mother      Hypertension Father      Cancer Father         sweat gland cancer     Other - See Comments Father 77        IPF - ideomatic pulmonary fibrosis     Lipids Brother      Thyroid Disease Brother      Arthritis Maternal Grandfather      Heart Disease Paternal Grandmother      Melanoma No family hx of        Social History     Socioeconomic History     Marital status:      Spouse name: Merline     Number of children: 2     Years of education: Not on file     Highest education level: Not on file   Occupational History     Employer: [employer]   Tobacco Use     Smoking status: Never Smoker     Smokeless tobacco: Never Used    Vaping Use     Vaping Use: Never used   Substance and Sexual Activity     Alcohol use: Yes     Comment: 2 every couple of months     Drug use: No     Sexual activity: Yes     Partners: Female     Birth control/protection: None   Other Topics Concern      Service Not Asked     Blood Transfusions Not Asked     Caffeine Concern Yes     Comment: 1 coffee daily     Occupational Exposure Not Asked     Hobby Hazards Not Asked     Sleep Concern Not Asked     Stress Concern Not Asked     Weight Concern Not Asked     Special Diet Not Asked     Back Care Not Asked     Exercise Yes     Comment: walks & Running-3 x days a week     Bike Helmet Not Asked     Seat Belt Yes     Self-Exams Yes     Parent/sibling w/ CABG, MI or angioplasty before 65F 55M? No   Social History Narrative     Not on file     Social Determinants of Health     Financial Resource Strain: Not on file   Food Insecurity: Not on file   Transportation Needs: Not on file   Physical Activity: Not on file   Stress: Not on file   Social Connections: Not on file   Intimate Partner Violence: Not on file   Housing Stability: Not on file       Outpatient Encounter Medications as of 1/20/2022   Medication Sig Dispense Refill     calcium-vitamin D (CALCIUM 600 + D) 600-400 MG-UNIT per tablet Take 1 tablet by mouth 2 times daily. 100 tablet 3     fluorouracil (EFUDEX) 5 % external cream Apply twice daily for 4 weeks to arms, neck, chest, forehead and ears. (dispense 80 g) 80 g 0     Multiple Vitamins-Minerals (MULTIVITAMIN OR)        ORDER FOR DME Superfeet inserts 2 Device 0     sildenafil (REVATIO) 20 MG tablet 30 min to 4 hrs before sex. Do not use with nitroglycerin, terazosin or doxazosin. 30 tablet 5     simvastatin (ZOCOR) 20 MG tablet TAKE 1 TABLET BY MOUTH AT  BEDTIME 30 tablet 0     SYNTHROID 112 MCG tablet Take 1 tablet (to make 162 mcg) 2 days a week alternating with taking 175 mcg 5 days per week. BRAND NAME- Do NOT SUBSTITUTE 24 tablet 1      "SYNTHROID 175 MCG tablet TAKE 1 TABLET BY MOUTH 5  DAYS A WEEK ALTERNATING  WITH TAKING 162 MCG 2 DAYS  PER WEEK 60 tablet 1     SYNTHROID 50 MCG tablet TAKE 1 TABLET BY MOUTH WITH 112 MCG TABLET 2 DAYS  WEEKLY ALTERNATING WITH 175 MCG TABLET 5 DAYS PER WEEK 26 tablet 0     loratadine (CLARITIN) 10 MG tablet Take 1 tablet (10 mg) by mouth daily (Patient taking differently: Take 10 mg by mouth as needed \"sometimes in the summer\") 90 tablet 3     No facility-administered encounter medications on file as of 1/20/2022.             O:   NAD, WDWN, Alert & Oriented, Mood & Affect wnl, Vitals stable   Here today alone   /82 (BP Location: Right arm, Patient Position: Sitting, Cuff Size: Adult Regular)   Pulse 80   SpO2 96%    General appearance normal   Vitals stable   Alert, oriented and in no acute distress   Normal skin reaction seen with efudex on neck, arms, chest          Stuck on papules and brown macules on trunk and ext      The remainder of skin exam is normal       Eyes: Conjunctivae/lids:Normal     ENT: Lips: normal    MSK:Normal    Cardiovascular: peripheral edema none    Pulm: Breathing Normal    Neuro/Psych: Orientation:Alert and Orientedx3 ; Mood/Affect:normal     A/P:  1. Actinic keratoses on face, neck, and chest   Continue efudex for another 1 weeks on arms. Then can treat ears forehead, scalp.     2. Seborrheic keratosis, lentigo, History of SCC  BENIGN LESIONS DISCUSSED WITH PATIENT:  I discussed the specifics of tumor, prognosis, and genetics of benign lesions.  I explained that treatment of these lesions would be purely cosmetic and not medically neccessary.  I discussed with patient different removal options including excision, cautery and /or laser.      Nature and genetics of benign skin lesions dicussed with patient.  Signs and Symptoms of skin cancer discussed with patient.  ABCDEs of melanoma reviewed with patient.  Patient encouraged to perform monthly skin exams.  UV precautions " reviewed with patient.  Risks of non-melanoma skin cancer discussed with patient   Return to clinic in one year or sooner if needed.

## 2022-01-20 NOTE — LETTER
1/20/2022         RE: Marcelo Hawkins  [address] [place] [id number]        Dear Colleague,    Thank you for referring your patient, Marcelo Hawkins, to the [facility]. Please see a copy of my visit note below.    Marcelo Hawkins is an extremely pleasant 56 year old year old male patient here today for skin check. He notes he has been currently treated for actinic keratoses on both arms, neck, and chest. He notes areas are getting irritated as expected. No painful or bleeding skin lesions. Patient has no other skin complaints today.  Remainder of the HPI, Meds, PMH, Allergies, FH, and SH was reviewed in chart.    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-s/p thyroidectomy, residual hypothyroidism     Other and unspecified hyperlipidemia      Postsurgical hypothyroidism     s/p thryoidectomy      Squamous cell carcinoma        Past Surgical History:   Procedure Laterality Date     COLONOSCOPY N/A 3/17/2016    Procedure: COLONOSCOPY;  Surgeon: Jovanny Fuller MD;  Location: [facility]     ESOPHAGOSCOPY, GASTROSCOPY, DUODENOSCOPY (EGD), COMBINED N/A 5/18/2017    Procedure: COMBINED ESOPHAGOSCOPY, GASTROSCOPY, DUODENOSCOPY (EGD), BIOPSY SINGLE OR MULTIPLE;  Gastroscopy  ;  Surgeon: Jovanny Fuller MD;  Location: [facility]     SURGICAL HISTORY OF -       hernia times three-right inguinal     SURGICAL HISTORY OF -   1989    thyroidectomy        Family History   Problem Relation Age of Onset     Lipids Mother      Hypertension Father      Cancer Father         sweat gland cancer     Other - See Comments Father 77        IPF - ideomatic pulmonary fibrosis     Lipids Brother      Thyroid Disease Brother      Arthritis Maternal Grandfather      Heart Disease Paternal Grandmother      Melanoma No family hx of        Social History     Socioeconomic History     Marital status:       Spouse name: Merline     Number of children: 2     Years of education: Not on file     Highest education level: Not on file   Occupational History     Employer: [employer]   Tobacco Use     Smoking status: Never Smoker     Smokeless tobacco: Never Used   Vaping Use     Vaping Use: Never used   Substance and Sexual Activity     Alcohol use: Yes     Comment: 2 every couple of months     Drug use: No     Sexual activity: Yes     Partners: Female     Birth control/protection: None   Other Topics Concern      Service Not Asked     Blood Transfusions Not Asked     Caffeine Concern Yes     Comment: 1 coffee daily     Occupational Exposure Not Asked     Hobby Hazards Not Asked     Sleep Concern Not Asked     Stress Concern Not Asked     Weight Concern Not Asked     Special Diet Not Asked     Back Care Not Asked     Exercise Yes     Comment: walks & Running-3 x days a week     Bike Helmet Not Asked     Seat Belt Yes     Self-Exams Yes     Parent/sibling w/ CABG, MI or angioplasty before 65F 55M? No   Social History Narrative     Not on file     Social Determinants of Health     Financial Resource Strain: Not on file   Food Insecurity: Not on file   Transportation Needs: Not on file   Physical Activity: Not on file   Stress: Not on file   Social Connections: Not on file   Intimate Partner Violence: Not on file   Housing Stability: Not on file       Outpatient Encounter Medications as of 1/20/2022   Medication Sig Dispense Refill     calcium-vitamin D (CALCIUM 600 + D) 600-400 MG-UNIT per tablet Take 1 tablet by mouth 2 times daily. 100 tablet 3     fluorouracil (EFUDEX) 5 % external cream Apply twice daily for 4 weeks to arms, neck, chest, forehead and ears. (dispense 80 g) 80 g 0     Multiple Vitamins-Minerals (MULTIVITAMIN OR)        ORDER FOR DME Superfeet inserts 2 Device 0     sildenafil (REVATIO) 20 MG tablet 30 min to 4 hrs before sex. Do not use with nitroglycerin, terazosin or doxazosin. 30 tablet 5      "simvastatin (ZOCOR) 20 MG tablet TAKE 1 TABLET BY MOUTH AT  BEDTIME 30 tablet 0     SYNTHROID 112 MCG tablet Take 1 tablet (to make 162 mcg) 2 days a week alternating with taking 175 mcg 5 days per week. BRAND NAME- Do NOT SUBSTITUTE 24 tablet 1     SYNTHROID 175 MCG tablet TAKE 1 TABLET BY MOUTH 5  DAYS A WEEK ALTERNATING  WITH TAKING 162 MCG 2 DAYS  PER WEEK 60 tablet 1     SYNTHROID 50 MCG tablet TAKE 1 TABLET BY MOUTH WITH 112 MCG TABLET 2 DAYS  WEEKLY ALTERNATING WITH 175 MCG TABLET 5 DAYS PER WEEK 26 tablet 0     loratadine (CLARITIN) 10 MG tablet Take 1 tablet (10 mg) by mouth daily (Patient taking differently: Take 10 mg by mouth as needed \"sometimes in the summer\") 90 tablet 3     No facility-administered encounter medications on file as of 1/20/2022.             O:   NAD, WDWN, Alert & Oriented, Mood & Affect wnl, Vitals stable   Here today alone   /82 (BP Location: Right arm, Patient Position: Sitting, Cuff Size: Adult Regular)   Pulse 80   SpO2 96%    General appearance normal   Vitals stable   Alert, oriented and in no acute distress   Normal skin reaction seen with efudex on neck, arms, chest          Stuck on papules and brown macules on trunk and ext      The remainder of skin exam is normal       Eyes: Conjunctivae/lids:Normal     ENT: Lips: normal    MSK:Normal    Cardiovascular: peripheral edema none    Pulm: Breathing Normal    Neuro/Psych: Orientation:Alert and Orientedx3 ; Mood/Affect:normal     A/P:  1. Actinic keratoses on face, neck, and chest   Continue efudex for another 1 weeks on arms. Then can treat ears forehead, scalp.     2. Seborrheic keratosis, lentigo, History of SCC  BENIGN LESIONS DISCUSSED WITH PATIENT:  I discussed the specifics of tumor, prognosis, and genetics of benign lesions.  I explained that treatment of these lesions would be purely cosmetic and not medically neccessary.  I discussed with patient different removal options including excision, cautery and /or " laser.      Nature and genetics of benign skin lesions dicussed with patient.  Signs and Symptoms of skin cancer discussed with patient.  ABCDEs of melanoma reviewed with patient.  Patient encouraged to perform monthly skin exams.  UV precautions reviewed with patient.  Risks of non-melanoma skin cancer discussed with patient   Return to clinic in one year or sooner if needed.         Again, thank you for allowing me to participate in the care of your patient.        Sincerely,        Stefani Clifton PA-C

## 2022-02-11 DIAGNOSIS — E78.5 HYPERLIPIDEMIA LDL GOAL <130: ICD-10-CM

## 2022-02-14 RX ORDER — SIMVASTATIN 20 MG
TABLET ORAL
Qty: 90 TABLET | Refills: 1 | Status: SHIPPED | OUTPATIENT
Start: 2022-02-14 | End: 2022-06-02

## 2022-03-03 DIAGNOSIS — N52.8 OTHER MALE ERECTILE DYSFUNCTION: ICD-10-CM

## 2022-03-06 ENCOUNTER — HEALTH MAINTENANCE LETTER (OUTPATIENT)
Age: 57
End: 2022-03-06

## 2022-03-07 RX ORDER — SILDENAFIL CITRATE 20 MG/1
TABLET ORAL
Qty: 30 TABLET | Refills: 5 | Status: SHIPPED | OUTPATIENT
Start: 2022-03-07 | End: 2022-04-08

## 2022-04-08 ENCOUNTER — OFFICE VISIT (OUTPATIENT)
Dept: FAMILY MEDICINE | Facility: CLINIC | Age: 57
End: 2022-04-08
Payer: COMMERCIAL

## 2022-04-08 ENCOUNTER — ANCILLARY PROCEDURE (OUTPATIENT)
Dept: GENERAL RADIOLOGY | Facility: CLINIC | Age: 57
End: 2022-04-08
Attending: INTERNAL MEDICINE
Payer: COMMERCIAL

## 2022-04-08 VITALS
SYSTOLIC BLOOD PRESSURE: 128 MMHG | BODY MASS INDEX: 23.78 KG/M2 | OXYGEN SATURATION: 96 % | WEIGHT: 175.6 LBS | TEMPERATURE: 97.1 F | RESPIRATION RATE: 16 BRPM | HEIGHT: 72 IN | DIASTOLIC BLOOD PRESSURE: 86 MMHG | HEART RATE: 79 BPM

## 2022-04-08 DIAGNOSIS — C73 PAPILLARY CARCINOMA OF THYROID (H): ICD-10-CM

## 2022-04-08 DIAGNOSIS — M25.461 PAIN AND SWELLING OF RIGHT KNEE: ICD-10-CM

## 2022-04-08 DIAGNOSIS — R03.0 ELEVATED BLOOD PRESSURE READING WITHOUT DIAGNOSIS OF HYPERTENSION: ICD-10-CM

## 2022-04-08 DIAGNOSIS — M25.561 PAIN AND SWELLING OF RIGHT KNEE: Primary | ICD-10-CM

## 2022-04-08 DIAGNOSIS — M25.461 PAIN AND SWELLING OF RIGHT KNEE: Primary | ICD-10-CM

## 2022-04-08 DIAGNOSIS — N52.8 OTHER MALE ERECTILE DYSFUNCTION: ICD-10-CM

## 2022-04-08 DIAGNOSIS — E89.0 POSTSURGICAL HYPOTHYROIDISM: ICD-10-CM

## 2022-04-08 DIAGNOSIS — M25.561 PAIN AND SWELLING OF RIGHT KNEE: ICD-10-CM

## 2022-04-08 PROCEDURE — 99214 OFFICE O/P EST MOD 30 MIN: CPT | Performed by: INTERNAL MEDICINE

## 2022-04-08 PROCEDURE — 73560 X-RAY EXAM OF KNEE 1 OR 2: CPT | Mod: RT | Performed by: RADIOLOGY

## 2022-04-08 RX ORDER — LEVOTHYROXINE SODIUM 50 MCG
TABLET ORAL
Qty: 26 TABLET | Refills: 0 | Status: CANCELLED | OUTPATIENT
Start: 2022-04-08

## 2022-04-08 RX ORDER — LEVOTHYROXINE SODIUM 112 MCG
TABLET ORAL
Qty: 24 TABLET | Refills: 3 | Status: SHIPPED | OUTPATIENT
Start: 2022-04-08

## 2022-04-08 RX ORDER — LEVOTHYROXINE SODIUM 175 MCG
TABLET ORAL
Qty: 60 TABLET | Refills: 3 | Status: SHIPPED | OUTPATIENT
Start: 2022-04-08

## 2022-04-08 RX ORDER — LEVOTHYROXINE SODIUM 50 MCG
TABLET ORAL
Qty: 26 TABLET | Refills: 3 | Status: SHIPPED | OUTPATIENT
Start: 2022-04-08

## 2022-04-08 RX ORDER — SILDENAFIL CITRATE 20 MG/1
TABLET ORAL
Qty: 30 TABLET | Refills: 11 | Status: SHIPPED | OUTPATIENT
Start: 2022-04-08

## 2022-04-08 RX ORDER — LEVOTHYROXINE SODIUM 175 MCG
TABLET ORAL
Qty: 60 TABLET | Refills: 1 | Status: CANCELLED | OUTPATIENT
Start: 2022-04-08

## 2022-04-08 RX ORDER — LEVOTHYROXINE SODIUM 112 MCG
TABLET ORAL
Qty: 24 TABLET | Refills: 1 | Status: CANCELLED | OUTPATIENT
Start: 2022-04-08

## 2022-04-08 ASSESSMENT — ANXIETY QUESTIONNAIRES
5. BEING SO RESTLESS THAT IT IS HARD TO SIT STILL: NOT AT ALL
2. NOT BEING ABLE TO STOP OR CONTROL WORRYING: NOT AT ALL
3. WORRYING TOO MUCH ABOUT DIFFERENT THINGS: NOT AT ALL
GAD7 TOTAL SCORE: 0
IF YOU CHECKED OFF ANY PROBLEMS ON THIS QUESTIONNAIRE, HOW DIFFICULT HAVE THESE PROBLEMS MADE IT FOR YOU TO DO YOUR WORK, TAKE CARE OF THINGS AT HOME, OR GET ALONG WITH OTHER PEOPLE: NOT DIFFICULT AT ALL
7. FEELING AFRAID AS IF SOMETHING AWFUL MIGHT HAPPEN: NOT AT ALL
6. BECOMING EASILY ANNOYED OR IRRITABLE: NOT AT ALL
1. FEELING NERVOUS, ANXIOUS, OR ON EDGE: NOT AT ALL
4. TROUBLE RELAXING: NOT AT ALL

## 2022-04-08 ASSESSMENT — PATIENT HEALTH QUESTIONNAIRE - PHQ9: SUM OF ALL RESPONSES TO PHQ QUESTIONS 1-9: 0

## 2022-04-08 ASSESSMENT — PAIN SCALES - GENERAL: PAINLEVEL: MILD PAIN (2)

## 2022-04-08 NOTE — PATIENT INSTRUCTIONS
1. Xray today  2. Use knee sleeve  3. Use ibuprofen 400-600 mg 2-3 x day (take with food) for next 1 week  4. Refills sent    Patient Education     Treatment for Baker s Cyst (Popliteal Cyst)  A Baker s cyst (popliteal cyst) is a fluid-filled sac that forms behind the knee.  Types of treatment  You likely won t need any treatment if you don t have any symptoms from your Baker s cyst. Some Baker s cysts go away without any treatment. If your cyst starts causing symptoms, you might need treatment at that time.  If you do have symptoms, you may be treated depending on the cause of your cyst. For example, you may need medicine for rheumatoid arthritis.  Other treatments for a Baker s cyst can include:    Over-the-counter pain medicines    Arthrocentesis, where a needle is used to remove extra fluid from the joint space    Steroid injection into the joint to reduce cyst size    Surgery to remove the cyst  Possible complications of a Baker s cyst  In rare cases, a Baker s cyst may cause complications. The cyst may get larger, which may cause redness and swelling. The cyst may also rupture, causing warmth, redness, and pain in your calf.  The symptoms may be the same as a blood clot in the veins of the legs. Your healthcare provider may need imaging tests of your leg to make sure you don t have a clot. Rupture can also lead to its own complications, such as:    Trapping of a tibial nerve. This causes calf pain and numbness behind the leg. It can be treated with arthrocentesis and steroid injections.    Blockage of the popliteal artery. This causes pain and lack of blood flow to the leg. It can be treated with arthrocentesis and steroid injections or surgery.    Compartment syndrome. This causes intense pain and problems moving the foot or toes. It is the result of pressure building in the muscles causing blood flow to decrease. Compartment syndrome is a medical emergency that requires immediate surgery. It can lead to  permanent muscle damage if not treated right away.  When to call the healthcare provider  If your cyst starts causing mild symptoms, plan to see your healthcare provider soon. See him or her right away if you have symptoms such as redness and swelling of your leg, or numbness and discoloration of the foot. These symptoms may mean your Baker s cyst has ruptured or causing other problems.  [employer] last reviewed this educational content on 5/1/2018 2000-2021 [employer]. All rights reserved. This information is not intended as a substitute for professional medical care. Always follow your healthcare professional's instructions.

## 2022-04-08 NOTE — RESULT ENCOUNTER NOTE
There is a small amount of fluid in the knee.  There is no arthritis seen which is good.  Continue plan of care discussed at the time of visit

## 2022-04-08 NOTE — PROGRESS NOTES
Assessment & Plan   Problem List Items Addressed This Visit     Elevated blood pressure reading without diagnosis of hypertension    Papillary carcinoma of thyroid (H)    Relevant Medications    SYNTHROID 50 MCG tablet    SYNTHROID 175 MCG tablet    SYNTHROID 112 MCG tablet    Postsurgical hypothyroidism    Relevant Medications    SYNTHROID 50 MCG tablet    SYNTHROID 175 MCG tablet    SYNTHROID 112 MCG tablet      Other Visit Diagnoses     Pain and swelling of right knee    -  Primary    Relevant Orders    XR Knee Standing Right 2 Views    Other male erectile dysfunction        Relevant Medications    sildenafil (REVATIO) 20 MG tablet                Patient Instructions   1. Xray today  2. Use knee sleeve  3. Use ibuprofen 400-600 mg 2-3 x day (take with food) for next 1 week  4. Refills sent    Patient Education     Treatment for Baker s Cyst (Popliteal Cyst)  A Baker s cyst (popliteal cyst) is a fluid-filled sac that forms behind the knee.  Types of treatment  You likely won t need any treatment if you don t have any symptoms from your Baker s cyst. Some Baker s cysts go away without any treatment. If your cyst starts causing symptoms, you might need treatment at that time.  If you do have symptoms, you may be treated depending on the cause of your cyst. For example, you may need medicine for rheumatoid arthritis.  Other treatments for a Baker s cyst can include:    Over-the-counter pain medicines    Arthrocentesis, where a needle is used to remove extra fluid from the joint space    Steroid injection into the joint to reduce cyst size    Surgery to remove the cyst  Possible complications of a Baker s cyst  In rare cases, a Baker s cyst may cause complications. The cyst may get larger, which may cause redness and swelling. The cyst may also rupture, causing warmth, redness, and pain in your calf.  The symptoms may be the same as a blood clot in the veins of the legs. Your healthcare provider may need imaging  tests of your leg to make sure you don t have a clot. Rupture can also lead to its own complications, such as:    Trapping of a tibial nerve. This causes calf pain and numbness behind the leg. It can be treated with arthrocentesis and steroid injections.    Blockage of the popliteal artery. This causes pain and lack of blood flow to the leg. It can be treated with arthrocentesis and steroid injections or surgery.    Compartment syndrome. This causes intense pain and problems moving the foot or toes. It is the result of pressure building in the muscles causing blood flow to decrease. Compartment syndrome is a medical emergency that requires immediate surgery. It can lead to permanent muscle damage if not treated right away.  When to call the healthcare provider  If your cyst starts causing mild symptoms, plan to see your healthcare provider soon. See him or her right away if you have symptoms such as redness and swelling of your leg, or numbness and discoloration of the foot. These symptoms may mean your Baker s cyst has ruptured or causing other problems.  [employer] last reviewed this educational content on 5/1/2018 2000-2021 [employer]. All rights reserved. This information is not intended as a substitute for professional medical care. Always follow your healthcare professional's instructions.               Return in about 1 year (around 4/8/2023) for Annual Wellness Check-Up.    Maya Villarreal DO  [facility]    Talia Robertson is a 57 year old who presents for the following health issues  accompanied by his self.    History of Present Illness       Reason for visit:  Knee  Symptom onset:  More than a month    He eats 2-3 servings of fruits and vegetables daily.He exercises with enough effort to increase his heart rate 20 to 29 minutes per day.    He is taking medications regularly.       Pain History:  When did you first notice your pain? - More than 6 weeks pain is  behind the knee  Have you seen this provider for your pain in the past? No   Where in your body do your have pain? Behind the knee  Are you seeing anyone else for your pain? No  What makes your pain better? pillow  What makes your pain worse? kicking  How has pain affected your ability to work? 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 Wife  --he is worried the clicking/popping is a sign of 'something wearing out'  --a few days a go, noted swelling of the lateral superior knee  --difficult to fully extend knee due to 'pressure' in popliteal area  --there is no pain; mild discomfort   --he has appointment with Ortho in 1 week  --no injury leading up to this; no prior history of knee problems  --works IT    Chronic Pain - Initial Assessment:    How would you describe your pain? Pressure  Have you had any recent changes to the severity or character of your pain? Edema  Is there an underlying cause that has been identified? none  Has your ability to work or do daily activities changed recently because of your pain? no  Which of these pain treatments have you tried? Stretching  Previous medication treatments:  none      Hypothyroidism Follow-up      Since last visit, patient describes the following symptoms: Weight stable, no hair loss, no skin changes, no constipation, no loose stools    Takes 175 5x week and 162 2x week; uses brand name for years, working well for him.  Most recent TSH in Jan was normal      Current Outpatient Medications   Medication Sig Dispense Refill     calcium-vitamin D (CALCIUM 600 + D) 600-400 MG-UNIT per tablet Take 1 tablet by mouth 2 times daily. 100 tablet 3     fluorouracil (EFUDEX) 5 % external cream Apply twice daily for 4 weeks to arms, neck, chest, forehead and ears. (dispense 80 g) 80 g 0     Multiple Vitamins-Minerals (MULTIVITAMIN OR)        ORDER FOR DME Superfeet inserts 2 Device 0     sildenafil (REVATIO) 20 MG  "tablet 1 tab 30 min to 4 hrs before sex. Do not use with nitroglycerin, terazosin or doxazosin. 30 tablet 5     simvastatin (ZOCOR) 20 MG tablet TAKE 1 TABLET BY MOUTH AT  BEDTIME 90 tablet 1     SYNTHROID 112 MCG tablet Take 1 tablet (to make 162 mcg) 2 days a week alternating with taking 175 mcg 5 days per week. BRAND NAME- Do NOT SUBSTITUTE 24 tablet 1     SYNTHROID 175 MCG tablet TAKE 1 TABLET BY MOUTH 5  DAYS A WEEK ALTERNATING  WITH TAKING 162 MCG 2 DAYS  PER WEEK 60 tablet 1     SYNTHROID 50 MCG tablet TAKE 1 TABLET BY MOUTH WITH 112 MCG TABLET 2 DAYS  WEEKLY ALTERNATING WITH 175 MCG TABLET 5 DAYS PER WEEK 26 tablet 0           Review of Systems   Constitutional, HEENT, cardiovascular, pulmonary, gi and gu systems are negative, except as otherwise noted.      Objective    /86 (BP Location: Right arm, Patient Position: Sitting, Cuff Size: Adult Regular)   Pulse 79   Temp 97.1  F (36.2  C) (Tympanic)   Resp 16   Ht 1.83 m (6' 0.05\")   Wt 79.7 kg (175 lb 9.6 oz)   SpO2 96%   BMI 23.78 kg/m    Body mass index is 23.78 kg/m .  Physical Exam   GENERAL APPEARANCE: healthy, alert and no distress  ORTHO: Knee Exam: Inspection: small effusion, popliteal fluid  Tender: medial joint line  Non-tender: inferior pole patella, patella tendon, MCL, LCL, lateral joint line, prepatellar bursa, popliteal region, pes anserine bursa  Active Range of Motion: decreased flexion,pain with flexion, full extension, pain with extension  Strength: all normal  Special tests: normal Valgus stress test, normal Varus, negative hyperflexion external rotation test                "

## 2022-04-09 ASSESSMENT — ANXIETY QUESTIONNAIRES: GAD7 TOTAL SCORE: 0

## 2022-04-15 ENCOUNTER — OFFICE VISIT (OUTPATIENT)
Dept: ORTHOPEDICS | Facility: CLINIC | Age: 57
End: 2022-04-15
Payer: COMMERCIAL

## 2022-04-15 VITALS
HEIGHT: 72 IN | WEIGHT: 175 LBS | SYSTOLIC BLOOD PRESSURE: 147 MMHG | BODY MASS INDEX: 23.7 KG/M2 | DIASTOLIC BLOOD PRESSURE: 78 MMHG

## 2022-04-15 DIAGNOSIS — M25.561 ACUTE PAIN OF RIGHT KNEE: Primary | ICD-10-CM

## 2022-04-15 DIAGNOSIS — M25.461 EFFUSION OF RIGHT KNEE: ICD-10-CM

## 2022-04-15 PROCEDURE — 99213 OFFICE O/P EST LOW 20 MIN: CPT | Performed by: PEDIATRICS

## 2022-04-15 NOTE — PROGRESS NOTES
ASSESSMENT & PLAN    Marcelo was seen today for pain.    Diagnoses and all orders for this visit:    Acute pain of right knee    Effusion of right knee      This issue is acute and Unchanged.    We discussed these other possible diagnosis: likely aggravation of mild arthritis and meniscal tear    We discussed the following treatment options: symptom treatment, activity modification/rest, imaging, rehab and referral. Following discussion, plan: will monitor symptoms, continue supportive care at this point.    Plan:  - Today's Plan of Care:  Continue with relative rest and activity modification, Ice, Compression, and Elevation.  Can apply ice 10-15 minutes 3-4 times per day as needed. OTC medications as needed.  Knee Sleeve of brace  Home Exercise Program - range of motion, quad sets, straight leg raises    -We also discussed other future treatment options:  Referral to Physical Therapy  MRI of right knee  Consideration of corticosteroid injection    Follow Up: 6 - 8 weeks and as needed    Concerning signs and symptoms were reviewed.  The patient expressed understanding of this management plan and all questions were answered at this time.    Maribell Eden MD [facility]  Sports Medicine Physician  [facility]      -----  Chief Complaint   Patient presents with     Right Knee - Pain       SUBJECTIVE  Marcelo Hawkins is a/an 57 year old male who is seen in consultation at the request of DO Phil  for evaluation of right knee pain.     The patient is seen by themselves.    Onset: few month(s) ago. Reports insidious onset without acute precipitating event.  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    OBJECTIVE:  BP (!) 147/78   Ht 1.829 m (6')   Wt 79.4 kg (175 lb)   BMI 23.73 kg/m     General: healthy, alert and in no distress  HEENT: no scleral icterus or conjunctival erythema  Skin: no suspicious lesions or rash. No jaundice.  CV: distal perfusion intact  Resp: normal respiratory effort without conversational dyspnea   Psych: normal mood and affect  Gait: normal steady gait with appropriate coordination and balance  Neuro: Normal light sensory exam of lower extremity    Bilateral Knee exam  Inspection:      mild effusion right    Patella:      Normal patellar tracking noted through range of motion right    Tender:      medial joint line right    Non Tender:      remainder of knee area right    Knee ROM:      Range of motion limited in full flexion and full extension right    Hip ROM:     No pain    Strength:      5-/5 with knee extension right    Special Tests:     positive (+) Livier right       neg (-) anterior drawer right       neg (-) posterior drawer right       neg (-) varus at 0 deg and 30 deg right       neg (-) valgus at 0 deg and 30 deg right    Gait:      Normal    Neurovascular:      2+ peripheral pulses bilaterally and brisk capillary refill       sensation grossly intact    RADIOLOGY:  I independently visualized and reviewed these images with the patient  2 XR views of right knee reviewed 4/8/2022: no acute bony abnormality, mild egenerative  change    Results for orders placed or performed in visit on 04/08/22   XR Knee Standing Right 2 Views    Narrative    RIGHT KNEE STANDING TWO VIEWS     4/8/2022 7:59 AM     HISTORY:  Pain and swelling of right knee.     COMPARISON: None.      Impression    IMPRESSION: Trace effusion. Normal bones, joint spaces and alignment.  No evidence of fracture or calcified intra-articular body.     KELSY SWANSON MD         SYSTEM ID:  [id number]         Review of the result(s) of each unique test - XR

## 2022-04-15 NOTE — LETTER
4/15/2022         RE: Marcelo Hawkins  [address] [address] [place] [id number]        Dear Colleague,    Thank you for referring your patient, Marcelo Hawkins, to the [facility] [place]. Please see a copy of my visit note below.    ASSESSMENT & PLAN    Marcelo was seen today for pain.    Diagnoses and all orders for this visit:    Acute pain of right knee    Effusion of right knee      This issue is acute and Unchanged.    We discussed these other possible diagnosis: likely aggravation of mild arthritis and meniscal tear    We discussed the following treatment options: symptom treatment, activity modification/rest, imaging, rehab and referral. Following discussion, plan: will monitor symptoms, continue supportive care at this point.    Plan:  - Today's Plan of Care:  Continue with relative rest and activity modification, Ice, Compression, and Elevation.  Can apply ice 10-15 minutes 3-4 times per day as needed. OTC medications as needed.  Knee Sleeve of brace  Home Exercise Program - range of motion, quad sets, straight leg raises    -We also discussed other future treatment options:  Referral to Physical Therapy  MRI of right knee  Consideration of corticosteroid injection    Follow Up: 6 - 8 weeks and as needed    Concerning signs and symptoms were reviewed.  The patient expressed understanding of this management plan and all questions were answered at this time.    Maribell Eden MD [facility]  Sports Medicine Physician  [facility]      -----  Chief Complaint   Patient presents with     Right Knee - Pain       SUBJECTIVE  Marcelo Hawkins is a/an 57 year old male who is seen in consultation at the request of DO Phil  for evaluation of right knee pain.     The patient is seen by themselves.    Onset: few month(s) ago. Reports insidious onset without acute precipitating event.  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    OBJECTIVE:  BP (!) 147/78   Ht 1.829 m (6')   Wt 79.4 kg (175 lb)   BMI 23.73 kg/m     General: healthy, alert and in no distress  HEENT: no scleral icterus or conjunctival erythema  Skin: no suspicious lesions or rash. No jaundice.  CV: distal perfusion intact  Resp: normal respiratory effort without conversational dyspnea   Psych: normal mood and affect  Gait: normal steady gait with appropriate coordination and balance  Neuro: Normal light sensory exam of lower extremity    Bilateral Knee exam  Inspection:      mild effusion right    Patella:      Normal patellar tracking noted through range of motion right    Tender:      medial joint line right    Non Tender:      remainder of knee area right    Knee ROM:      Range of motion limited in full flexion and full extension right    Hip ROM:     No pain    Strength:      5-/5 with knee extension right    Special Tests:     positive (+) Livier right       neg (-) anterior drawer right       neg (-) posterior drawer right       neg (-) varus at 0 deg and 30 deg right       neg (-) valgus at 0 deg and 30 deg right    Gait:       Normal    Neurovascular:      2+ peripheral pulses bilaterally and brisk capillary refill       sensation grossly intact    RADIOLOGY:  I independently visualized and reviewed these images with the patient  2 XR views of right knee reviewed 4/8/2022: no acute bony abnormality, mild egenerative change    Results for orders placed or performed in visit on 04/08/22   XR Knee Standing Right 2 Views    Narrative    RIGHT KNEE STANDING TWO VIEWS     4/8/2022 7:59 AM     HISTORY:  Pain and swelling of right knee.     COMPARISON: None.      Impression    IMPRESSION: Trace effusion. Normal bones, joint spaces and alignment.  No evidence of fracture or calcified intra-articular body.     KELSY SWANSON MD         SYSTEM ID:  [id number]         Review of the result(s) of each unique test - XR             Again, thank you for allowing me to participate in the care of your patient.        Sincerely,        Maribell Eden MD

## 2022-04-15 NOTE — PATIENT INSTRUCTIONS
We discussed these other possible diagnosis: likely aggravation of mild arthritis and meniscal tear    We discussed the following treatment options: symptom treatment, activity modification/rest, imaging, rehab and referral. Following discussion, plan: will monitor symptoms, continue supportive care at this point.    Plan:  - Today's Plan of Care:  Continue with relative rest and activity modification, Ice, Compression, and Elevation.  Can apply ice 10-15 minutes 3-4 times per day as needed. OTC medications as needed.  Knee Sleeve of brace  Home Exercise Program - range of motion, quad sets, straight leg raises    -We also discussed other future treatment options:  Referral to Physical Therapy  MRI of right knee    Follow Up: 6 - 8 weeks and as needed    If you have any further questions for your physician or physician s care team you can call [phone] and use option 3 to leave a voice message. Calls received during business hours will be returned same day.

## 2022-04-19 ENCOUNTER — TELEPHONE (OUTPATIENT)
Dept: FAMILY MEDICINE | Facility: CLINIC | Age: 57
End: 2022-04-19
Payer: COMMERCIAL

## 2022-04-19 NOTE — TELEPHONE ENCOUNTER
Prior Authorization Retail Medication Request    Medication/Dose: Synthroid 50 mcg    ICD code (if different than what is on RX):    Previously Tried and Failed:  Levothyroxine 150 mg; synthroid 100, 112 , 125, 137, 150, 175 mcg; synthroid 50 mcg; levoxyl 100, 125 mcg  Rationale:  Patient has tried and failed on lower dosing    Insurance Name:  [employer]  Insurance ID:  [id number]      Pharmacy Information (if different than what is on RX)  Name:    Phone:

## 2022-04-19 NOTE — TELEPHONE ENCOUNTER
Prior Authorization Retail Medication Request    Medication/Dose: Synthroid 112 mcg    ICD code (if different than what is on RX):    Previously Tried and Failed:  Levothyroxine 150 mg; synthroid 100, 112 , 125, 137, 150, 175 mcg; synthroid 50 mcg; levoxyl 100, 125 mcg  Rationale:  Patient has tried and failed on lower dosing    Insurance Name:  [employer]  Insurance ID:  [id number]      Pharmacy Information (if different than what is on RX)  Name:    Phone:

## 2022-04-19 NOTE — TELEPHONE ENCOUNTER
Prior Authorization Retail Medication Request    Medication/Dose: Synthroid 175 mcg    ICD code (if different than what is on RX):    Previously Tried and Failed:  Levothyroxine 150 mg; synthroid 100, 112 , 125, 137, 150, 175 mcg; synthroid 50 mcg; levoxyl 100, 125 mcg  Rationale:  Patient has tried and failed on lower dosing    Insurance Name:  [employer]  Insurance ID:  [id number]      Pharmacy Information (if different than what is on RX)  Name:    Phone:

## 2022-04-19 NOTE — LETTER
[facility]  [address] [facility] [address]  Phone: [phone]        April 28, 2022      Marcelo Hawkins                                                                                                                                [address] [address] [place] [id number]            Attn Appeals:    Please reconsider coverage of patient's Synthroid prescription filled as brand name.  Mr. Hawkins reports that when using generic forms of the Synthroid, the  would frequently change when he would get the medication from the pharmacy which he said caused instability of his thyroid levels. Patient reports that since being on the Synthroid, his labs have remained stable.     Thank you,      Maya Villarreal, DO

## 2022-04-23 NOTE — TELEPHONE ENCOUNTER
PA Initiation    Medication: SYNTHROID 50 MCG tablet   Insurance Company: [employer] ([facility]) - Phone [phone] Fax [phone]  Pharmacy Filling the Rx: [facility] - [place], [place] - [address] [facility], [address]  Filling Pharmacy Phone: [phone]  Filling Pharmacy Fax: [phone]  Start Date: 4/23/2022

## 2022-04-23 NOTE — TELEPHONE ENCOUNTER
PA Initiation    Medication: SYNTHROID 175 MCG tablet   Insurance Company: [employer] ([facility]) - Phone [phone] Fax [phone]  Pharmacy Filling the Rx: [facility] - [place], [place] - [address] [facility], [address]  Filling Pharmacy Phone: [phone]  Filling Pharmacy Fax: [phone]  Start Date: 4/23/2022

## 2022-04-23 NOTE — TELEPHONE ENCOUNTER
PA Initiation    Medication: SYNTHROID 112 MCG tablet   Insurance Company: [employer] ([facility]) - Phone [phone] Fax [phone]  Pharmacy Filling the Rx: [email or web address] - [place], [place] - [address] [facility], [address]  Filling Pharmacy Phone: [phone]  Filling Pharmacy Fax: [phone]  Start Date: 4/23/2022

## 2022-04-25 NOTE — TELEPHONE ENCOUNTER
PRIOR AUTHORIZATION DENIED    Medication: SYNTHROID 112 MCG tablet--DENIED    Denial Date: 4/23/2022    Denial Rational: Plan needs medical records indicating why patient is unable to use generic.       Appeal Information:

## 2022-04-25 NOTE — TELEPHONE ENCOUNTER
PRIOR AUTHORIZATION DENIED    Medication: SYNTHROID 175 MCG tablet--DENIED    Denial Date: 4/23/2022    Denial Rational: Plan needs medical records indicating why patient is unable to use generic.     Appeal Information:

## 2022-04-25 NOTE — TELEPHONE ENCOUNTER
PRIOR AUTHORIZATION DENIED    Medication: SYNTHROID 50 MCG tablet--DENIED    Denial Date: 4/23/2022    Denial Rational: Plan needs medical records indicating why patient is unable to use generic.       Appeal Information:

## 2022-04-27 NOTE — TELEPHONE ENCOUNTER
"Dr. Villarreal  PA for Synthroid 112 MCG; 50 mcg; and 175 mcg have been denied.    Denial Rational: Plan needs medical records indicating why patient is unable to use generic.        I tried to find notes in patient's chart regarding why brand name.  I have only found 8/13/2015 telephone note indicating:    Marcelo Hawkins  to Maya Lema, APRN CNP      4:10 PM  Maya,   Can you resend the prescription over to the long term pharmacy, but send it over as  'Do Not Substitute\" with generic. I would like to stay with the SYNTHROID as we work towards stabilization. Otherwise, over time, different generic manufacturers will show up.  Thanks, Marcelo Velasquezor  " room air

## 2022-04-27 NOTE — TELEPHONE ENCOUNTER
See most recent note from 4/8/2022.  He has used the brand-name for many years.  I do not know any other details about why the generic is contraindicated.  He should establish care with a new primary care provider.  My practice is closed to new patients

## 2022-04-28 NOTE — TELEPHONE ENCOUNTER
Writer spoke with patient. He reports that when using generic forms of the Synthroid, the  would frequently change when he would get the medication from the pharmacy which he said caused instability of his thyroid levels. Patient reports that since being on the Synthroid, his labs have remained stable. Patient said that he isn't out of the medication yet, but does need to order soon. Says that he'll pay out of pocket in the meantime if needed.     Essie Lucia RN  [facility]

## 2022-04-29 ENCOUNTER — IMMUNIZATION (OUTPATIENT)
Dept: FAMILY MEDICINE | Facility: CLINIC | Age: 57
End: 2022-04-29
Payer: COMMERCIAL

## 2022-04-29 PROCEDURE — 91306 COVID-19,PF,MODERNA (18+ YRS BOOSTER .25ML): CPT

## 2022-04-29 PROCEDURE — 0064A COVID-19,PF,MODERNA (18+ YRS BOOSTER .25ML): CPT

## 2022-04-29 NOTE — TELEPHONE ENCOUNTER
PA team I have created an appeal letter if needed for brand name Synthroid.  Also Dr. Villarreal indicated this was addressed at 4/8/22 office notes.  Thank you.

## 2022-05-02 NOTE — TELEPHONE ENCOUNTER
Medication Appeal Initiation    We have initiated an appeal for the requested medication:  Medication: SYNTHROID 112 MCG tablet--DENIED  Appeal Start Date:  5/2/2022  Insurance Company: [employer] ([facility]) - Phone [phone] Fax [phone]  Comments:

## 2022-05-04 NOTE — TELEPHONE ENCOUNTER
MEDICATION APPEAL APPROVED    Medication: SYNTHROID 112 MCG tablet--APPEAL APPROVED  Authorization Effective Date: 5/3/2022  Authorization Expiration Date: 5/3/2023  Approved Dose/Quantity:   Reference #:     Insurance Company: [employer] ([facility]) - Phone [phone] Fax [phone]  Expected CoPay:       CoPay Card Available:      Foundation Assistance Needed:    Which Pharmacy is filling the prescription (Not needed for infusion/clinic administered): [facility] - [address], [address]      Received phone call regarding approval.  Per insurance rep approval letter will be mailed to clinic and patient.

## 2022-06-01 DIAGNOSIS — E78.5 HYPERLIPIDEMIA LDL GOAL <130: ICD-10-CM

## 2022-06-02 RX ORDER — SIMVASTATIN 20 MG
20 TABLET ORAL AT BEDTIME
Qty: 90 TABLET | Refills: 1 | Status: SHIPPED | OUTPATIENT
Start: 2022-06-02 | End: 2023-01-03

## 2022-12-26 ENCOUNTER — HEALTH MAINTENANCE LETTER (OUTPATIENT)
Age: 57
End: 2022-12-26

## 2023-01-02 DIAGNOSIS — E78.5 HYPERLIPIDEMIA LDL GOAL <130: ICD-10-CM

## 2023-01-03 RX ORDER — SIMVASTATIN 20 MG
TABLET ORAL
Qty: 90 TABLET | Refills: 0 | Status: SHIPPED | OUTPATIENT
Start: 2023-01-03 | End: 2023-03-13

## 2023-03-12 DIAGNOSIS — E78.5 HYPERLIPIDEMIA LDL GOAL <130: ICD-10-CM

## 2023-03-13 RX ORDER — SIMVASTATIN 20 MG
20 TABLET ORAL AT BEDTIME
Qty: 90 TABLET | Refills: 0 | Status: SHIPPED | OUTPATIENT
Start: 2023-03-13

## 2023-03-13 NOTE — TELEPHONE ENCOUNTER
Pending Prescriptions:                       Disp   Refills    simvastatin (ZOCOR) 20 MG tablet [Pharmacy*90 tab*3        Sig: TAKE 1 TABLET BY MOUTH AT  BEDTIME        Routing refill request to provider for review/approval because:  Patient due for annual visit and overdue for related labs. [employer]in[employer] sent advising patient to schedule appt and come fasting.        Last Written Prescription Date:  1/3/23  Last Fill Quantity: 90,  # refills: 0       Jennifer Mcginnis RN

## 2023-04-16 ENCOUNTER — HEALTH MAINTENANCE LETTER (OUTPATIENT)
Age: 58
End: 2023-04-16

## 2023-06-16 DIAGNOSIS — E78.5 HYPERLIPIDEMIA LDL GOAL <130: ICD-10-CM

## 2023-06-16 RX ORDER — SIMVASTATIN 20 MG
TABLET ORAL
Qty: 90 TABLET | Refills: 3 | OUTPATIENT
Start: 2023-06-16

## 2024-03-21 NOTE — TELEPHONE ENCOUNTER
Patient LVM returning call. Will be available today after 3:30p.    Protocol For Puva: The patient received PUVA. Consent: Written consent obtained.  The risks were reviewed with the patient including but not limited to: burn, pigmentary changes, pain, blistering, scabbing, redness, increased risk of skin cancers, and the remote possibility of scarring. Protocol For Photochemotherapy: Tar And Nbuvb (Goeckerman Treatment): The patient received Photochemotherapy: Tar and NBUVB (Goeckerman treatment). Comments On Previous Treatment: Pt tolerated txt well. Skin Type: II Protocol For Uva: The patient received UVA. Protocol For Photochemotherapy: Petrolatum And Broad Band Uvb: The patient received Photochemotherapy: Petrolatum and Broad Band UVB. Treatment Number: 74 Changes In Treatment Protocol: Hold at max dose 750mj Protocol For Photochemotherapy: Triamcinolone Ointment And Nbuvb: The patient received Photochemotherapy: Triamcinolone and NBUVB (triamcinolone ointment applied to all lesions prior to phototherapy). Render Post-Care In The Note: no Protocol For Photochemotherapy: Mineral Oil And Broad Band Uvb: The patient received Photochemotherapy: Mineral Oil and Broad Band UVB. Protocol For Photochemotherapy For Severe Photoresponsive Dermatoses: Tar And Nbuvb (Goeckerman Treatment): The patient received Photochemotherapy for severe photoresponsive dermatoses: Tar and NBUVB (Goeckerman treatment) requiring at least 4 to 8 hours of care under direct physician supervision. Protocol For Photochemotherapy For Severe Photoresponsive Dermatoses: Puva: The patient received Photochemotherapy for severe photoresponsive dermatoses: PUVA requiring at least 4 to 8 hours of care under direct physician supervision. Protocol For Uva1: The patient received UVA1. Total Body Energy: 750hold Protocol: NBUVB Total Body Time: 3:34 Detail Level: Zone Protocol For Photochemotherapy: Petrolatum And Nbuvb: The patient received Photochemotherapy: Petrolatum and NBUVB (petrolatum applied to all lesions prior to phototherapy). Protocol For Broad Band Uvb: The patient received Broad Band UVB. Name Of Supervising Technician: Yuli Johnson Protocol For Photochemotherapy: Baby Oil And Nbuvb: The patient received Photochemotherapy: Baby Oil and NBUVB (baby oil applied to all lesions prior to phototherapy). Protocol For Nbuvb: The patient received NBUVB. Protocol For Bath Puva: The patient received Bath PUVA. Protocol For Photochemotherapy For Severe Photoresponsive Dermatoses: Tar And Broad Band Uvb (Goeckerman Treatment): The patient received Photochemotherapy for severe photoresponsive dermatoses: Tar and Broad Band UVB (Goeckerman treatment) requiring at least 4 to 8 hours of care under direct physician supervision. Protocol For Photochemotherapy For Severe Photoresponsive Dermatoses: Petrolatum And Nbuvb: The patient received Photochemotherapy for severe photoresponsive dermatoses: Petrolatum and NBUVB requiring at least 4 to 8 hours of care under direct physician supervision. Post-Care Instructions: I reviewed with the patient in detail post-care instructions. Patient is to wear sun protection. Patients may expect sunburn like redness, discomfort and scabbing. Irradiance (Optional- Include Units): 3.4 Protocol For Photochemotherapy For Severe Photoresponsive Dermatoses: Petrolatum And Broad Band Uvb: The patient received Photochemotherapyfor severe photoresponsive dermatoses: Petrolatum and Broad Band UVB requiring at least 4 to 8 hours of care under direct physician supervision. Protocol For Photochemotherapy: Mineral Oil And Nbuvb: The patient received Photochemotherapy: Mineral Oil and NBUVB (mineral oil applied to all lesions prior to phototherapy). Protocol For Nb Uva: The patient received NB UVA. Protocol For Photochemotherapy: Tar And Broad Band Uvb (Goeckerman Treatment): The patient received Photochemotherapy: Tar and Broad Band UVB (Goeckerman treatment). Protocol For Protocol For Photochemotherapy For Severe Photoresponsive Dermatoses: Bath Puva: The patient received Photochemotherapy for severe photoresponsive dermatoses: Bath PUVA requiring at least 4 to 8 hours of care under direct physician supervision.

## 2024-06-23 ENCOUNTER — HEALTH MAINTENANCE LETTER (OUTPATIENT)
Age: 59
End: 2024-06-23